# Patient Record
Sex: MALE | Race: WHITE | NOT HISPANIC OR LATINO | Employment: PART TIME | URBAN - METROPOLITAN AREA
[De-identification: names, ages, dates, MRNs, and addresses within clinical notes are randomized per-mention and may not be internally consistent; named-entity substitution may affect disease eponyms.]

---

## 2023-02-09 PROBLEM — I10 BENIGN ESSENTIAL HYPERTENSION: Status: ACTIVE | Noted: 2023-02-09

## 2023-02-09 PROBLEM — I48.0 PAROXYSMAL ATRIAL FIBRILLATION (MULTI): Status: ACTIVE | Noted: 2023-02-09

## 2023-02-09 PROBLEM — I50.9 CHF (CONGESTIVE HEART FAILURE), NYHA CLASS II (MULTI): Status: ACTIVE | Noted: 2023-02-09

## 2023-02-09 PROBLEM — R19.5 POSITIVE COLORECTAL CANCER SCREENING USING COLOGUARD TEST: Status: ACTIVE | Noted: 2023-02-09

## 2023-02-09 PROBLEM — R73.09 ELEVATED GLUCOSE: Status: ACTIVE | Noted: 2023-02-09

## 2023-02-09 PROBLEM — I42.9 CARDIOMYOPATHY (MULTI): Status: ACTIVE | Noted: 2023-02-09

## 2023-02-09 PROBLEM — B00.9 HERPES SIMPLEX VIRUS INFECTION: Status: ACTIVE | Noted: 2023-02-09

## 2023-02-09 PROBLEM — E78.2 MIXED HYPERLIPIDEMIA: Status: ACTIVE | Noted: 2023-02-09

## 2023-02-09 PROBLEM — I25.10 CAD (CORONARY ARTERY DISEASE): Status: ACTIVE | Noted: 2023-02-09

## 2023-02-09 PROBLEM — R97.20 ELEVATED PSA: Status: ACTIVE | Noted: 2023-02-09

## 2023-02-09 PROBLEM — E66.3 OVERWEIGHT WITH BODY MASS INDEX (BMI) OF 27 TO 27.9 IN ADULT: Status: ACTIVE | Noted: 2023-02-09

## 2023-02-09 RX ORDER — TALC
1 POWDER (GRAM) TOPICAL DAILY
COMMUNITY
Start: 2021-10-08

## 2023-02-09 RX ORDER — ACYCLOVIR 400 MG/1
1 TABLET ORAL 2 TIMES DAILY PRN
COMMUNITY
End: 2023-03-23 | Stop reason: SDUPTHER

## 2023-02-09 RX ORDER — SOTALOL HYDROCHLORIDE 120 MG/1
1 TABLET ORAL 2 TIMES DAILY
COMMUNITY
Start: 2022-05-27 | End: 2024-04-03 | Stop reason: SDUPTHER

## 2023-02-09 RX ORDER — AMLODIPINE BESYLATE 10 MG/1
1 TABLET ORAL DAILY
COMMUNITY
Start: 2018-12-03 | End: 2023-05-26 | Stop reason: SDUPTHER

## 2023-02-09 RX ORDER — AA/PROT/LYSINE/METHIO/VIT C/B6 50-12.5 MG
1 TABLET ORAL
COMMUNITY
Start: 2021-10-08 | End: 2023-03-23 | Stop reason: ALTCHOICE

## 2023-02-09 RX ORDER — LISINOPRIL 10 MG/1
1 TABLET ORAL DAILY
COMMUNITY
End: 2023-03-13 | Stop reason: SDUPTHER

## 2023-02-09 RX ORDER — ASPIRIN 81 MG/1
1 TABLET ORAL DAILY
COMMUNITY

## 2023-02-09 RX ORDER — METOPROLOL SUCCINATE 50 MG/1
1 TABLET, EXTENDED RELEASE ORAL NIGHTLY
COMMUNITY
Start: 2022-05-27 | End: 2024-04-03 | Stop reason: SDUPTHER

## 2023-02-09 RX ORDER — LOVASTATIN 10 MG/1
1 TABLET ORAL DAILY
COMMUNITY
Start: 2020-08-11 | End: 2024-01-11 | Stop reason: SDUPTHER

## 2023-03-13 ENCOUNTER — TELEPHONE (OUTPATIENT)
Dept: PRIMARY CARE | Facility: CLINIC | Age: 68
End: 2023-03-13
Payer: MEDICARE

## 2023-03-13 DIAGNOSIS — I10 PRIMARY HYPERTENSION: ICD-10-CM

## 2023-03-13 DIAGNOSIS — I10 PRIMARY HYPERTENSION: Primary | ICD-10-CM

## 2023-03-13 RX ORDER — LISINOPRIL 10 MG/1
10 TABLET ORAL DAILY
Qty: 90 TABLET | Refills: 3 | Status: SHIPPED | OUTPATIENT
Start: 2023-03-13 | End: 2023-03-14 | Stop reason: SDUPTHER

## 2023-03-14 RX ORDER — EPINEPHRINE 0.22MG
AEROSOL WITH ADAPTER (ML) INHALATION
COMMUNITY
End: 2023-03-23 | Stop reason: ALTCHOICE

## 2023-03-14 RX ORDER — LANOLIN ALCOHOL/MO/W.PET/CERES
CREAM (GRAM) TOPICAL
COMMUNITY
End: 2023-03-23 | Stop reason: ALTCHOICE

## 2023-03-14 RX ORDER — AMLODIPINE BESYLATE 10 MG/1
1 TABLET ORAL DAILY
COMMUNITY
End: 2023-03-23 | Stop reason: ALTCHOICE

## 2023-03-14 RX ORDER — PREDNISOLONE ACETATE 10 MG/ML
1 SUSPENSION/ DROPS OPHTHALMIC 3 TIMES DAILY
COMMUNITY
Start: 2022-06-26 | End: 2023-03-23 | Stop reason: ALTCHOICE

## 2023-03-14 RX ORDER — NITROGLYCERIN 0.4 MG/1
0.4 TABLET SUBLINGUAL EVERY 5 MIN PRN
COMMUNITY
Start: 2022-04-24

## 2023-03-14 RX ORDER — KETOROLAC TROMETHAMINE 5 MG/ML
1 SOLUTION OPHTHALMIC 4 TIMES DAILY
COMMUNITY
Start: 2022-05-04 | End: 2024-04-03 | Stop reason: WASHOUT

## 2023-03-14 RX ORDER — LISINOPRIL 10 MG/1
10 TABLET ORAL DAILY
Qty: 90 TABLET | Refills: 3 | Status: SHIPPED | OUTPATIENT
Start: 2023-03-14 | End: 2024-01-11 | Stop reason: SDUPTHER

## 2023-03-23 ENCOUNTER — OFFICE VISIT (OUTPATIENT)
Dept: PRIMARY CARE | Facility: CLINIC | Age: 68
End: 2023-03-23
Payer: MEDICARE

## 2023-03-23 VITALS
DIASTOLIC BLOOD PRESSURE: 82 MMHG | SYSTOLIC BLOOD PRESSURE: 144 MMHG | HEIGHT: 71 IN | WEIGHT: 183 LBS | BODY MASS INDEX: 25.62 KG/M2

## 2023-03-23 DIAGNOSIS — I48.19 OTHER PERSISTENT ATRIAL FIBRILLATION (MULTI): ICD-10-CM

## 2023-03-23 DIAGNOSIS — B00.9 HERPES: Primary | ICD-10-CM

## 2023-03-23 DIAGNOSIS — E78.2 MIXED HYPERLIPIDEMIA: ICD-10-CM

## 2023-03-23 DIAGNOSIS — I50.42 CHRONIC COMBINED SYSTOLIC AND DIASTOLIC CONGESTIVE HEART FAILURE, NYHA CLASS 2 (MULTI): ICD-10-CM

## 2023-03-23 DIAGNOSIS — E66.3 OVERWEIGHT WITH BODY MASS INDEX (BMI) OF 27 TO 27.9 IN ADULT: ICD-10-CM

## 2023-03-23 DIAGNOSIS — I48.0 PAROXYSMAL ATRIAL FIBRILLATION (MULTI): ICD-10-CM

## 2023-03-23 DIAGNOSIS — I25.10 CORONARY ARTERY DISEASE INVOLVING NATIVE CORONARY ARTERY OF NATIVE HEART WITHOUT ANGINA PECTORIS: ICD-10-CM

## 2023-03-23 DIAGNOSIS — B00.9 HERPES SIMPLEX VIRUS INFECTION: ICD-10-CM

## 2023-03-23 DIAGNOSIS — I10 BENIGN ESSENTIAL HYPERTENSION: ICD-10-CM

## 2023-03-23 PROCEDURE — 3077F SYST BP >= 140 MM HG: CPT | Performed by: FAMILY MEDICINE

## 2023-03-23 PROCEDURE — 3079F DIAST BP 80-89 MM HG: CPT | Performed by: FAMILY MEDICINE

## 2023-03-23 PROCEDURE — 1159F MED LIST DOCD IN RCRD: CPT | Performed by: FAMILY MEDICINE

## 2023-03-23 PROCEDURE — 1160F RVW MEDS BY RX/DR IN RCRD: CPT | Performed by: FAMILY MEDICINE

## 2023-03-23 PROCEDURE — 99213 OFFICE O/P EST LOW 20 MIN: CPT | Performed by: FAMILY MEDICINE

## 2023-03-23 PROCEDURE — 3008F BODY MASS INDEX DOCD: CPT | Performed by: FAMILY MEDICINE

## 2023-03-23 RX ORDER — ACYCLOVIR 400 MG/1
400 TABLET ORAL
Qty: 30 TABLET | Refills: 4 | Status: SHIPPED | OUTPATIENT
Start: 2023-03-23 | End: 2023-04-22

## 2023-03-23 ASSESSMENT — ENCOUNTER SYMPTOMS
PSYCHIATRIC NEGATIVE: 1
RESPIRATORY NEGATIVE: 1
NEUROLOGICAL NEGATIVE: 1
CONSTITUTIONAL NEGATIVE: 1
HEMATOLOGIC/LYMPHATIC NEGATIVE: 1
ENDOCRINE NEGATIVE: 1
BLOOD IN STOOL: 1
ALLERGIC/IMMUNOLOGIC NEGATIVE: 1
CARDIOVASCULAR NEGATIVE: 1

## 2023-03-23 NOTE — PATIENT INSTRUCTIONS
Contd meds , diet ,daily X's ,FUWOD's ,labs , pt has apt with  for colonscopy next month  , tcb x 1wk , monitor bp daily , , all reports noted ,

## 2023-04-21 NOTE — PROGRESS NOTES
"Subjective   Patient ID: Thomas Lay is a 67 y.o. male who presents for Follow-up (Patient presented today for a 4 month follow up to medication mangement.).    RX , ROV         Review of Systems   Constitutional: Negative.    HENT: Negative.     Eyes:  Positive for visual disturbance.   Respiratory: Negative.     Cardiovascular: Negative.    Gastrointestinal:  Positive for blood in stool.   Endocrine: Negative.    Genitourinary: Negative.    Skin: Negative.    Allergic/Immunologic: Negative.    Neurological: Negative.    Hematological: Negative.    Psychiatric/Behavioral: Negative.         Objective   /82   Ht 1.803 m (5' 11\")   Wt 83 kg (183 lb)   BMI 25.52 kg/m²     Physical Exam  Constitutional:       Appearance: Normal appearance.   HENT:      Head: Normocephalic and atraumatic.      Nose: Nose normal.      Mouth/Throat:      Mouth: Mucous membranes are moist.   Cardiovascular:      Rate and Rhythm: Normal rate. Rhythm irregular.      Heart sounds: No murmur heard.     No friction rub. No gallop.   Pulmonary:      Effort: Pulmonary effort is normal.   Musculoskeletal:         General: Normal range of motion.      Cervical back: Normal range of motion and neck supple.   Skin:     General: Skin is warm.      Findings: Erythema: lower lips.   Neurological:      General: No focal deficit present.      Mental Status: He is alert and oriented to person, place, and time.   Psychiatric:         Mood and Affect: Mood normal.         Behavior: Behavior normal.         Assessment/Plan          "
same as pt

## 2023-05-26 DIAGNOSIS — I10 BENIGN ESSENTIAL HYPERTENSION: Primary | ICD-10-CM

## 2023-05-26 RX ORDER — AMLODIPINE BESYLATE 10 MG/1
10 TABLET ORAL DAILY
Qty: 90 TABLET | Refills: 3 | Status: SHIPPED | OUTPATIENT
Start: 2023-05-26 | End: 2024-01-11 | Stop reason: SDUPTHER

## 2023-07-21 ENCOUNTER — LAB (OUTPATIENT)
Dept: LAB | Facility: LAB | Age: 68
End: 2023-07-21
Payer: MEDICARE

## 2023-07-21 DIAGNOSIS — I10 BENIGN ESSENTIAL HYPERTENSION: ICD-10-CM

## 2023-07-21 LAB
ALANINE AMINOTRANSFERASE (SGPT) (U/L) IN SER/PLAS: 14 U/L (ref 10–52)
ALBUMIN (G/DL) IN SER/PLAS: 4.4 G/DL (ref 3.4–5)
ALKALINE PHOSPHATASE (U/L) IN SER/PLAS: 70 U/L (ref 33–136)
ANION GAP IN SER/PLAS: 10 MMOL/L (ref 10–20)
ASPARTATE AMINOTRANSFERASE (SGOT) (U/L) IN SER/PLAS: 16 U/L (ref 9–39)
BILIRUBIN TOTAL (MG/DL) IN SER/PLAS: 0.6 MG/DL (ref 0–1.2)
CALCIUM (MG/DL) IN SER/PLAS: 9.3 MG/DL (ref 8.6–10.3)
CARBON DIOXIDE, TOTAL (MMOL/L) IN SER/PLAS: 29 MMOL/L (ref 21–32)
CHLORIDE (MMOL/L) IN SER/PLAS: 107 MMOL/L (ref 98–107)
CHOLESTEROL (MG/DL) IN SER/PLAS: 146 MG/DL (ref 0–199)
CHOLESTEROL IN HDL (MG/DL) IN SER/PLAS: 55.5 MG/DL
CHOLESTEROL/HDL RATIO: 2.6
CREATININE (MG/DL) IN SER/PLAS: 1.01 MG/DL (ref 0.5–1.3)
GFR MALE: 81 ML/MIN/1.73M2
GLUCOSE (MG/DL) IN SER/PLAS: 104 MG/DL (ref 74–99)
LDL: 69 MG/DL (ref 0–99)
POTASSIUM (MMOL/L) IN SER/PLAS: 4.6 MMOL/L (ref 3.5–5.3)
PROTEIN TOTAL: 6.8 G/DL (ref 6.4–8.2)
SODIUM (MMOL/L) IN SER/PLAS: 141 MMOL/L (ref 136–145)
TRIGLYCERIDE (MG/DL) IN SER/PLAS: 108 MG/DL (ref 0–149)
UREA NITROGEN (MG/DL) IN SER/PLAS: 13 MG/DL (ref 6–23)
VLDL: 22 MG/DL (ref 0–40)

## 2023-07-21 PROCEDURE — 80061 LIPID PANEL: CPT

## 2023-07-21 PROCEDURE — 36415 COLL VENOUS BLD VENIPUNCTURE: CPT

## 2023-07-21 PROCEDURE — 80053 COMPREHEN METABOLIC PANEL: CPT

## 2023-07-27 ENCOUNTER — OFFICE VISIT (OUTPATIENT)
Dept: PRIMARY CARE | Facility: CLINIC | Age: 68
End: 2023-07-27
Payer: MEDICARE

## 2023-07-27 ENCOUNTER — APPOINTMENT (OUTPATIENT)
Dept: PRIMARY CARE | Facility: CLINIC | Age: 68
End: 2023-07-27
Payer: MEDICARE

## 2023-07-27 VITALS
SYSTOLIC BLOOD PRESSURE: 135 MMHG | HEART RATE: 62 BPM | OXYGEN SATURATION: 96 % | WEIGHT: 197 LBS | BODY MASS INDEX: 27.58 KG/M2 | HEIGHT: 71 IN | DIASTOLIC BLOOD PRESSURE: 81 MMHG

## 2023-07-27 DIAGNOSIS — D12.6 ADENOMATOUS POLYP OF COLON, UNSPECIFIED PART OF COLON: ICD-10-CM

## 2023-07-27 DIAGNOSIS — E55.9 VITAMIN D DEFICIENCY: ICD-10-CM

## 2023-07-27 DIAGNOSIS — I48.0 PAROXYSMAL ATRIAL FIBRILLATION (MULTI): Primary | ICD-10-CM

## 2023-07-27 DIAGNOSIS — I25.10 CORONARY ARTERY DISEASE INVOLVING NATIVE CORONARY ARTERY OF NATIVE HEART WITHOUT ANGINA PECTORIS: ICD-10-CM

## 2023-07-27 DIAGNOSIS — R97.20 ELEVATED PSA: ICD-10-CM

## 2023-07-27 DIAGNOSIS — Z91.89 FRAMINGHAM CARDIAC RISK 10-20% IN NEXT 10 YEARS: ICD-10-CM

## 2023-07-27 DIAGNOSIS — K57.30 SIGMOID DIVERTICULOSIS: ICD-10-CM

## 2023-07-27 DIAGNOSIS — E78.2 MIXED HYPERLIPIDEMIA: ICD-10-CM

## 2023-07-27 DIAGNOSIS — Z53.20 SCREENING FOR HEPATITIS C DECLINED: ICD-10-CM

## 2023-07-27 DIAGNOSIS — I10 ESSENTIAL HYPERTENSION: ICD-10-CM

## 2023-07-27 DIAGNOSIS — I50.42 CHRONIC COMBINED SYSTOLIC AND DIASTOLIC CONGESTIVE HEART FAILURE, NYHA CLASS 2 (MULTI): ICD-10-CM

## 2023-07-27 DIAGNOSIS — R35.1 NOCTURIA: ICD-10-CM

## 2023-07-27 DIAGNOSIS — R73.9 HYPERGLYCEMIA: ICD-10-CM

## 2023-07-27 PROCEDURE — 1160F RVW MEDS BY RX/DR IN RCRD: CPT | Performed by: INTERNAL MEDICINE

## 2023-07-27 PROCEDURE — 99204 OFFICE O/P NEW MOD 45 MIN: CPT | Performed by: INTERNAL MEDICINE

## 2023-07-27 PROCEDURE — 3075F SYST BP GE 130 - 139MM HG: CPT | Performed by: INTERNAL MEDICINE

## 2023-07-27 PROCEDURE — 3008F BODY MASS INDEX DOCD: CPT | Performed by: INTERNAL MEDICINE

## 2023-07-27 PROCEDURE — 3079F DIAST BP 80-89 MM HG: CPT | Performed by: INTERNAL MEDICINE

## 2023-07-27 PROCEDURE — 1036F TOBACCO NON-USER: CPT | Performed by: INTERNAL MEDICINE

## 2023-07-27 PROCEDURE — 1159F MED LIST DOCD IN RCRD: CPT | Performed by: INTERNAL MEDICINE

## 2023-07-27 RX ORDER — ACYCLOVIR 400 MG/1
TABLET ORAL 2 TIMES DAILY
COMMUNITY
Start: 2021-06-30 | End: 2024-04-04 | Stop reason: SDUPTHER

## 2023-07-27 NOTE — PROGRESS NOTES
Subjective   Patient ID: Thomas Lay is a 67 y.o. male who presents for Establish Care (Pt in today to establish care).    HPI   67-year-old male here for primary care.  Previous patient of Dr. Platt.  Seems to have been doing well, and in the meantime, would like to make sure that he has continue with the of care, including adequate refills.  Compliant with medications, tolerating regimens.    The patient points out that he just recently had a COLONOSCOPY with ANDRES Carmona.  Findings were relatively benign, with biopsy noted.  Patient recommended to have repeat study in about 3 years.  In the meantime, the patient has been doing well.   Appetite preserved, with no nausea, vomiting, abdominal distress.  No diarrhea, no constipation.  No apparent blood in stool.  No apparent weight loss.  No skin changes, rashes, pruritus, jaundice.  No easy bruisability.      Noted to have elevated blood pressure, for which the patient states that on average, his blood pressure runs 130s over 70s, with no readings above 140 systolically.  Furthermore, compliant with medications, tolerating regimens.  Following closely with EP, as well as CARDIOLOGY, especially with significant cardiac history.  Has been hemodynamically asymptomatic.  No severiano chest pain.  No orthopnea, no paroxysmal nocturnal dyspnea.  No dizziness, diaphoresis, palpitations.  No loss of consciousness.  No bipedal edema.  No remarkable dyspnea on exertion.      Noted to have elevated PSA, with patient stating that he has had work-up in the past, including ultrasound of his prostate.  States that he has no particular hesitancy, and feels that perhaps he is emptying completely.  No dysuria, flank, suprapubic pain.  No urgency.  No penile discharge, pruritus, rash, no jaundice.  Still, he does point out that he has had some nocturia, but most of the time only once, sometimes twice, and never 3 times each night.    Has otherwise been doing well.  Physically  "active.  No coughing, no particular sputum production.  CONSTITUTIONALLY, no fever, no chills.  No night sweats.  No lingering anorexia or nausea.  No apparent lymphadenopathy.  No apparent weight loss.        Review of Systems  Review of systems as in history of present illness, and otherwise, reviewed separately as well, and was unremarkable/negative/noncontributory.        Objective   /81 (BP Location: Left arm, Patient Position: Sitting)   Pulse 62   Ht 1.803 m (5' 11\")   Wt 89.4 kg (197 lb)   SpO2 96%   BMI 27.48 kg/m²     Physical Exam  In very good spirits.  Not in distress or diaphoresis.  Alert, oriented x3.  Amiable.  Not unkempt.  Moderately built.  Not cachectic.  Receptive.  Cheerful.  Appropriate.  Eager to stay healthy, independent, and productive, and does not wish harm to self or others.    HEAD pink palpebral conjunctivae, anicteric sclerae.  Mucous membranes moist.  No tonsillopharyngeal congestion, no thrush.  NECK supple, no apparent jugular venous distention.  No carotid bruit noted.  CARDIOVASCULAR not in distress or diaphoresis.  No bipedal edema.  Regular rate and rhythm.  No murmurs.  LUNGS not in distress or diaphoresis.  Not using accessory muscles.  Clear to auscultation bilaterally.  ABDOMEN soft, nontender.  BACK no costovertebral angle tenderness.  EXTREMITIES no clubbing, no cyanosis.  SKIN with no breakdown, bleeding, jaundice.  NEURO no facial asymmetry.  No apparent cranial nerve deficits.  Romberg negative.  Ambulating without need of assistance.  No apparent focal weakness.  No tremors.  PSYCH receptive, appropriate, and eager to maintain and improve quality of life.      Interim LABORATORY examination results, COLONOSCOPY results noted, reviewed with patient.        Assessment/Plan   Problem List Items Addressed This Visit       Adenomatous polyp of colon    CAD (coronary artery disease)    CHF (congestive heart failure), NYHA class II (CMS/HCC)    Elevated PSA    " Essential hypertension    [place] cardiac risk 10-20% in next 10 years    Hyperglycemia    Mixed hyperlipidemia    Paroxysmal atrial fibrillation (CMS/HCC) - Primary    Sigmoid diverticulosis    Vitamin D deficiency     Other Visit Diagnoses       Nocturia        Screening for hepatitis C declined                 Thank you very much for coming.  It is nice to meet you.    Thank you for taking care of yourself.  I would like to contribute to your overall care.  I am glad to hear that you have been seeing Dr. Calle, EP, as well as Dr. Dupree, CARDIOLOGY, and they have been confident enough to tell you to just come back once a year.    Yes, if you have any refill needs, please contact the office, either by telephone, or through the Internet.  It is best to get in touch with us, instead of going directly to your pharmacy, because we will know exactly what you will need.    Your blood pressure is mildly elevated today.  Please continue to check your blood pressure at home while at rest.  Make sure that the first number stays 139 or less, and make sure that the second number stays 88 or less.  If you have any persistent elevations, please let me know, and I can give you advice until you are seen again by cardiology/EP.    Please continue to stay physically active.  AEROBIC activities are best for blood pressure control, and are very good for mood, energy, and weight management!  You look like you are able to exercise regularly.  Please continue to take care of yourself.    Likewise, please drink lots of fluids, and avoid salt.  Good for blood pressure control, and good for keeping your kidneys healthy.    I am glad to see that you saw GI, Dr. Andersen, and that he proceeded to do a biopsy.  With history of colon polyps and tubular adenoma, it is ideal that you have a repeat colonoscopy within the next 3 years.  I am glad that this is already scheduled.    I do understand that you have an enlarged prostate, and that  sometimes, you have to get up in the middle of the night to urinate.  It will be time to repeat your PSA and urine studies in August.  Until then, try to limit your fluid intake by taking only sips of water after supper.  This way, you will not have to get up as often to urinate.    There are medications that we can try if symptoms persist, especially if you have to get up to urinate 3 or more times each night.  As we have discussed, they are not without risks, but they can be very helpful if needed.  Let me know if you would like to talk about this sooner.  We will also discuss it when you return in about 6 weeks.    Thank you for having your FASTING laboratory examinations done care of Dr. Platt.  Your kidney and liver functions were preserved.  Your cholesterol panel is close to ideal.  Please continue your current regimens.    Medicare recommends that you get your PNEUMONIA vaccination.  This is only once for your lifetime, and does not need to be repeated.  Schedule this with your local friendly pharmacist.  Ask for PREVNAR 20.    After 2 weeks, go ahead and schedule for your SHINGLES vaccination, which is a SHINGRIX series of 2 injections at least 1 month apart.  Again, recommended by Medicare, and already paid for by your insurance.    Please do not take more than 1 vaccination per visit.  Space them out at least 2 weeks apart.    Again, thank you very much for coming.  It is a pleasure to meet you.  Thank you for taking care of yourself.  Please let me help.  Call or text as needed.  Please come back in 6 weeks, so that we can do your Medicare Wellness evaluation for the year.  Go ahead and do NONFASTING blood and urine examinations a few days prior, so that we can also review the results at that point.    Please continue to take care of yourself and your family, and please continue to pray for our recovery from this pandemic.  See you in 6 weeks.  Call sooner please as needed.            0  Return in 6  weeks.  40 minutes please.  Nonfasting laboratory examinations via [employer], then see me soon after for Medicare Wellness evaluation for the year.  Reassess mood, energy, function, preventive strategies, cardiovascular risk.  Reassess urinary symptoms.  Coordinate with cardiology, EP, GI.            0

## 2023-08-09 ENCOUNTER — TELEPHONE (OUTPATIENT)
Dept: PRIMARY CARE | Facility: CLINIC | Age: 68
End: 2023-08-09
Payer: MEDICARE

## 2023-08-09 NOTE — TELEPHONE ENCOUNTER
Patient called wanting to schedule an appt to establish care with Dr. Goodman.   Please advise if ok to add to schedule

## 2023-09-06 LAB
ALANINE AMINOTRANSFERASE (SGPT) (U/L) IN SER/PLAS: 17 U/L (ref 10–52)
ALBUMIN (G/DL) IN SER/PLAS: 4.3 G/DL (ref 3.4–5)
ALKALINE PHOSPHATASE (U/L) IN SER/PLAS: 66 U/L (ref 33–136)
ANION GAP IN SER/PLAS: 11 MMOL/L (ref 10–20)
APPEARANCE, URINE: CLEAR
ASPARTATE AMINOTRANSFERASE (SGOT) (U/L) IN SER/PLAS: 19 U/L (ref 9–39)
BASOPHILS (10*3/UL) IN BLOOD BY AUTOMATED COUNT: 0.05 X10E9/L (ref 0–0.1)
BASOPHILS/100 LEUKOCYTES IN BLOOD BY AUTOMATED COUNT: 0.8 % (ref 0–2)
BILIRUBIN TOTAL (MG/DL) IN SER/PLAS: 0.5 MG/DL (ref 0–1.2)
BILIRUBIN, URINE: NEGATIVE
BLOOD, URINE: ABNORMAL
CALCIDIOL (25 OH VITAMIN D3) (NG/ML) IN SER/PLAS: 9 NG/ML
CALCIUM (MG/DL) IN SER/PLAS: 8.8 MG/DL (ref 8.6–10.3)
CARBON DIOXIDE, TOTAL (MMOL/L) IN SER/PLAS: 26 MMOL/L (ref 21–32)
CHLORIDE (MMOL/L) IN SER/PLAS: 108 MMOL/L (ref 98–107)
CHOLESTEROL (MG/DL) IN SER/PLAS: 150 MG/DL (ref 0–199)
CHOLESTEROL IN HDL (MG/DL) IN SER/PLAS: 55.2 MG/DL
CHOLESTEROL/HDL RATIO: 2.7
COLOR, URINE: YELLOW
CREATININE (MG/DL) IN SER/PLAS: 1.02 MG/DL (ref 0.5–1.3)
EOSINOPHILS (10*3/UL) IN BLOOD BY AUTOMATED COUNT: 0.27 X10E9/L (ref 0–0.7)
EOSINOPHILS/100 LEUKOCYTES IN BLOOD BY AUTOMATED COUNT: 4.1 % (ref 0–6)
ERYTHROCYTE DISTRIBUTION WIDTH (RATIO) BY AUTOMATED COUNT: 12.9 % (ref 11.5–14.5)
ERYTHROCYTE MEAN CORPUSCULAR HEMOGLOBIN CONCENTRATION (G/DL) BY AUTOMATED: 33.3 G/DL (ref 32–36)
ERYTHROCYTE MEAN CORPUSCULAR VOLUME (FL) BY AUTOMATED COUNT: 90 FL (ref 80–100)
ERYTHROCYTES (10*6/UL) IN BLOOD BY AUTOMATED COUNT: 4.75 X10E12/L (ref 4.5–5.9)
GFR MALE: 80 ML/MIN/1.73M2
GLUCOSE (MG/DL) IN SER/PLAS: 96 MG/DL (ref 74–99)
GLUCOSE, URINE: NEGATIVE MG/DL
HEMATOCRIT (%) IN BLOOD BY AUTOMATED COUNT: 42.7 % (ref 41–52)
HEMOGLOBIN (G/DL) IN BLOOD: 14.2 G/DL (ref 13.5–17.5)
IMMATURE GRANULOCYTES/100 LEUKOCYTES IN BLOOD BY AUTOMATED COUNT: 0.2 % (ref 0–0.9)
KETONES, URINE: NEGATIVE MG/DL
LDL: 70 MG/DL (ref 0–99)
LEUKOCYTE ESTERASE, URINE: NEGATIVE
LEUKOCYTES (10*3/UL) IN BLOOD BY AUTOMATED COUNT: 6.6 X10E9/L (ref 4.4–11.3)
LYMPHOCYTES (10*3/UL) IN BLOOD BY AUTOMATED COUNT: 2.08 X10E9/L (ref 1.2–4.8)
LYMPHOCYTES/100 LEUKOCYTES IN BLOOD BY AUTOMATED COUNT: 31.5 % (ref 13–44)
MONOCYTES (10*3/UL) IN BLOOD BY AUTOMATED COUNT: 0.65 X10E9/L (ref 0.1–1)
MONOCYTES/100 LEUKOCYTES IN BLOOD BY AUTOMATED COUNT: 9.8 % (ref 2–10)
MUCUS, URINE: NORMAL /LPF
NEUTROPHILS (10*3/UL) IN BLOOD BY AUTOMATED COUNT: 3.55 X10E9/L (ref 1.2–7.7)
NEUTROPHILS/100 LEUKOCYTES IN BLOOD BY AUTOMATED COUNT: 53.6 % (ref 40–80)
NITRITE, URINE: NEGATIVE
PH, URINE: 6 (ref 5–8)
PLATELETS (10*3/UL) IN BLOOD AUTOMATED COUNT: 274 X10E9/L (ref 150–450)
POTASSIUM (MMOL/L) IN SER/PLAS: 4.3 MMOL/L (ref 3.5–5.3)
PROSTATE SPECIFIC AG (NG/ML) IN SER/PLAS: 3.16 NG/ML (ref 0–4)
PROTEIN TOTAL: 6.9 G/DL (ref 6.4–8.2)
PROTEIN, URINE: NEGATIVE MG/DL
RBC, URINE: 2 /HPF (ref 0–5)
SODIUM (MMOL/L) IN SER/PLAS: 141 MMOL/L (ref 136–145)
SPECIFIC GRAVITY, URINE: 1.02 (ref 1–1.03)
THYROTROPIN (MIU/L) IN SER/PLAS BY DETECTION LIMIT <= 0.05 MIU/L: 1.83 MIU/L (ref 0.44–3.98)
TRIGLYCERIDE (MG/DL) IN SER/PLAS: 126 MG/DL (ref 0–149)
UREA NITROGEN (MG/DL) IN SER/PLAS: 20 MG/DL (ref 6–23)
UROBILINOGEN, URINE: <2 MG/DL (ref 0–1.9)
VLDL: 25 MG/DL (ref 0–40)
WBC, URINE: 1 /HPF (ref 0–5)

## 2023-10-05 ENCOUNTER — APPOINTMENT (OUTPATIENT)
Dept: PRIMARY CARE | Facility: CLINIC | Age: 68
End: 2023-10-05
Payer: MEDICARE

## 2023-11-28 ENCOUNTER — OFFICE VISIT (OUTPATIENT)
Dept: PRIMARY CARE | Facility: CLINIC | Age: 68
End: 2023-11-28
Payer: MEDICARE

## 2023-11-28 VITALS
HEART RATE: 69 BPM | WEIGHT: 194 LBS | OXYGEN SATURATION: 99 % | TEMPERATURE: 97.4 F | DIASTOLIC BLOOD PRESSURE: 90 MMHG | SYSTOLIC BLOOD PRESSURE: 130 MMHG | BODY MASS INDEX: 27.77 KG/M2 | HEIGHT: 70 IN

## 2023-11-28 DIAGNOSIS — I48.0 PAROXYSMAL ATRIAL FIBRILLATION (MULTI): ICD-10-CM

## 2023-11-28 DIAGNOSIS — N52.8 OTHER MALE ERECTILE DYSFUNCTION: ICD-10-CM

## 2023-11-28 DIAGNOSIS — E55.9 VITAMIN D DEFICIENCY: ICD-10-CM

## 2023-11-28 DIAGNOSIS — I10 ESSENTIAL HYPERTENSION: Primary | ICD-10-CM

## 2023-11-28 PROCEDURE — 1036F TOBACCO NON-USER: CPT | Performed by: INTERNAL MEDICINE

## 2023-11-28 PROCEDURE — 1160F RVW MEDS BY RX/DR IN RCRD: CPT | Performed by: INTERNAL MEDICINE

## 2023-11-28 PROCEDURE — 3080F DIAST BP >= 90 MM HG: CPT | Performed by: INTERNAL MEDICINE

## 2023-11-28 PROCEDURE — 1159F MED LIST DOCD IN RCRD: CPT | Performed by: INTERNAL MEDICINE

## 2023-11-28 PROCEDURE — 3075F SYST BP GE 130 - 139MM HG: CPT | Performed by: INTERNAL MEDICINE

## 2023-11-28 PROCEDURE — 3008F BODY MASS INDEX DOCD: CPT | Performed by: INTERNAL MEDICINE

## 2023-11-28 PROCEDURE — 99214 OFFICE O/P EST MOD 30 MIN: CPT | Performed by: INTERNAL MEDICINE

## 2023-11-28 RX ORDER — SILDENAFIL 100 MG/1
100 TABLET, FILM COATED ORAL DAILY PRN
Qty: 12 TABLET | Refills: 3 | Status: SHIPPED | OUTPATIENT
Start: 2023-11-28 | End: 2024-11-27

## 2023-11-28 RX ORDER — ERGOCALCIFEROL 1.25 MG/1
1.25 CAPSULE ORAL
COMMUNITY

## 2023-11-28 ASSESSMENT — PATIENT HEALTH QUESTIONNAIRE - PHQ9
2. FEELING DOWN, DEPRESSED OR HOPELESS: NOT AT ALL
SUM OF ALL RESPONSES TO PHQ9 QUESTIONS 1 AND 2: 0
1. LITTLE INTEREST OR PLEASURE IN DOING THINGS: NOT AT ALL

## 2023-11-29 ASSESSMENT — ENCOUNTER SYMPTOMS
LIGHT-HEADEDNESS: 0
COUGH: 0
ACTIVITY CHANGE: 0
ABDOMINAL PAIN: 0
DYSURIA: 0
SORE THROAT: 0
MYALGIAS: 0

## 2024-01-11 DIAGNOSIS — E78.2 MIXED HYPERLIPIDEMIA: ICD-10-CM

## 2024-01-11 DIAGNOSIS — I10 BENIGN ESSENTIAL HYPERTENSION: ICD-10-CM

## 2024-01-11 DIAGNOSIS — I10 PRIMARY HYPERTENSION: ICD-10-CM

## 2024-01-11 RX ORDER — AMLODIPINE BESYLATE 10 MG/1
10 TABLET ORAL DAILY
Qty: 90 TABLET | Refills: 1 | Status: SHIPPED | OUTPATIENT
Start: 2024-01-11

## 2024-01-11 RX ORDER — LOVASTATIN 10 MG/1
10 TABLET ORAL DAILY
Qty: 90 TABLET | Refills: 1 | Status: SHIPPED | OUTPATIENT
Start: 2024-01-11

## 2024-01-11 RX ORDER — LISINOPRIL 10 MG/1
10 TABLET ORAL DAILY
Qty: 90 TABLET | Refills: 3 | Status: SHIPPED | OUTPATIENT
Start: 2024-01-11

## 2024-01-12 ENCOUNTER — OFFICE VISIT (OUTPATIENT)
Dept: CARDIOLOGY | Facility: CLINIC | Age: 69
End: 2024-01-12
Payer: MEDICARE

## 2024-01-12 VITALS — HEART RATE: 88 BPM | SYSTOLIC BLOOD PRESSURE: 120 MMHG | DIASTOLIC BLOOD PRESSURE: 70 MMHG

## 2024-01-12 DIAGNOSIS — I10 ESSENTIAL HYPERTENSION: ICD-10-CM

## 2024-01-12 DIAGNOSIS — I42.0 DILATED CARDIOMYOPATHY (MULTI): ICD-10-CM

## 2024-01-12 DIAGNOSIS — Z87.891 FORMER CIGARETTE SMOKER: ICD-10-CM

## 2024-01-12 DIAGNOSIS — E78.2 MIXED HYPERLIPIDEMIA: ICD-10-CM

## 2024-01-12 DIAGNOSIS — I25.10 CORONARY ARTERY DISEASE INVOLVING NATIVE CORONARY ARTERY OF NATIVE HEART WITHOUT ANGINA PECTORIS: ICD-10-CM

## 2024-01-12 DIAGNOSIS — I48.0 PAROXYSMAL ATRIAL FIBRILLATION (MULTI): ICD-10-CM

## 2024-01-12 PROCEDURE — 1036F TOBACCO NON-USER: CPT | Performed by: INTERNAL MEDICINE

## 2024-01-12 PROCEDURE — 3008F BODY MASS INDEX DOCD: CPT | Performed by: INTERNAL MEDICINE

## 2024-01-12 PROCEDURE — 99214 OFFICE O/P EST MOD 30 MIN: CPT | Performed by: INTERNAL MEDICINE

## 2024-01-12 PROCEDURE — 1159F MED LIST DOCD IN RCRD: CPT | Performed by: INTERNAL MEDICINE

## 2024-01-12 PROCEDURE — 3078F DIAST BP <80 MM HG: CPT | Performed by: INTERNAL MEDICINE

## 2024-01-12 PROCEDURE — 3074F SYST BP LT 130 MM HG: CPT | Performed by: INTERNAL MEDICINE

## 2024-01-12 RX ORDER — CIPROFLOXACIN HYDROCHLORIDE 3 MG/ML
SOLUTION/ DROPS OPHTHALMIC
COMMUNITY
Start: 2024-01-05 | End: 2024-04-03 | Stop reason: WASHOUT

## 2024-01-12 RX ORDER — PREDNISOLONE ACETATE 10 MG/ML
SUSPENSION/ DROPS OPHTHALMIC
COMMUNITY
Start: 2024-01-05 | End: 2024-04-03 | Stop reason: WASHOUT

## 2024-01-12 NOTE — PROGRESS NOTES
CARDIOLOGY OFFICE VISIT      CHIEF COMPLAINT  1 year follow up    HISTORY OF PRESENT ILLNESS  The patient states he just had right eye surgery and did well with that.  He denies chest discomfort or symptoms of myocardial ischemia.  He exercises regularly without any cardiovascular symptoms.  He denies any significant problem with dyspnea.  He states that since I saw he does have episodes where he goes into atrial fibrillation for half a day or day and then goes out.  I told him to make sure he mentions this to Dr. Calle when he sees him in the near future.  He denies any problem with his current medication.      Impression:  Paroxysmal atrial fibrillation  Coronary artery disease  Hypertension  Hyperlipidemia  Overweight  Dilated cardiomyopathy, left ventricular ejection fraction 30 to 35% on echocardiogram 2021, which has subsequent resolved with normal left ventricular systolic function on echocardiogram 2021, thought to be secondary to atrial fibrillation     Please excuse any errors in grammar or translation related to this dictation. Voice recognition software was utilized to prepare this document.          Past Medical History  Past Medical History:   Diagnosis Date    Abnormal findings on diagnostic imaging of heart and coronary circulation     Elevated coronary artery calcium score    Benign prostatic hyperplasia without lower urinary tract symptoms     Enlarged prostate without lower urinary tract symptoms (luts)    Encounter for general adult medical examination without abnormal findings     Health care maintenance    Overweight 2022    Overweight    Personal history of other infectious and parasitic diseases     History of herpes simplex infection       Social History  Social History     Tobacco Use    Smoking status: Former     Types: Cigarettes     Quit date:      Years since quittin.0    Smokeless tobacco: Never   Substance Use Topics    Alcohol use: Yes     Comment:  ocassionally    Drug use: Never       Family History     Family History   Problem Relation Name Age of Onset    Ulcerative colitis Mother      Colon cancer Mother      Colon cancer Father      Pancreatic cancer Father      Atrial fibrillation Brother      Ulcerative colitis Brother          Allergies:  Allergies   Allergen Reactions    Adhesive Tape-Silicones Unknown    Percocet [Oxycodone-Acetaminophen] Unknown        Outpatient Medications:  Current Outpatient Medications   Medication Instructions    acyclovir (Zovirax) 400 mg tablet oral, 2 times daily    amLODIPine (NORVASC) 10 mg, oral, Daily    apixaban (ELIQUIS) 5 mg, oral, 2 times daily    aspirin 81 mg EC tablet 1 tablet, oral, Daily    ciprofloxacin (Ciloxan) 0.3 % ophthalmic solution INSTILL 1 DROP IN RIGHT EYE FOUR TIMES DAILY    ergocalciferol (VITAMIN D-2) 1.25 mg, oral, Weekly    ketorolac (Acular) 0.5 % ophthalmic solution 1 drop, Both Eyes, 4 times daily    lisinopril 10 mg, oral, Daily    lovastatin (MEVACOR) 10 mg, oral, Daily    magnesium oxide (Mag-Ox) 250 mg magnesium tablet 1 tablet, oral, Daily    metoprolol succinate XL (Toprol-XL) 50 mg 24 hr tablet 1 tablet, oral, Nightly    nitroglycerin (NITROSTAT) 0.4 mg, sublingual, Every 5 min PRN    prednisoLONE acetate (Pred-Forte) 1 % ophthalmic suspension SHAKE LIQUID AND INSTILL 1 DROP IN RIGHT EYE FOUR TIMES DAILY    sildenafil (VIAGRA) 100 mg, oral, Daily PRN    sotalol (Betapace) 120 mg tablet 1 tablet, oral, 2 times daily          REVIEW OF SYSTEMS  Review of Systems   All other systems reviewed and are negative.        VITALS  Vitals:    01/12/24 1151   BP: 120/70   Pulse: 88       PHYSICAL EXAM  Constitutional:       Appearance: Healthy appearance. Not in distress.   Eyes:      Conjunctiva/sclera: Conjunctivae normal.      Pupils: Pupils are equal, round, and reactive to light.   Neck:      Vascular: No JVR. JVD normal.   Pulmonary:      Effort: Pulmonary effort is normal.      Breath  sounds: Normal breath sounds. No wheezing. No rhonchi. No rales.   Chest:      Chest wall: Not tender to palpatation.   Cardiovascular:      PMI at left midclavicular line. Normal rate. Irregularly irregular rhythm. Normal S1. Normal S2.       Murmurs: There is no murmur.      No gallop.  No click. No rub.   Pulses:     Intact distal pulses.   Edema:     Peripheral edema absent.   Abdominal:      Tenderness: There is no abdominal tenderness.   Musculoskeletal: Normal range of motion.         General: No tenderness.      Cervical back: Normal range of motion. Skin:     General: Skin is warm and dry.   Neurological:      General: No focal deficit present.      Mental Status: Alert and oriented to person, place and time.           ASSESSMENT AND PLAN  Diagnoses and all orders for this visit:  Paroxysmal atrial fibrillation (CMS/HCC)  Coronary artery disease involving native coronary artery of native heart without angina pectoris  Essential hypertension  Mixed hyperlipidemia  Dilated cardiomyopathy (CMS/HCC)  Former cigarette smoker      [unfilled]

## 2024-01-12 NOTE — PATIENT INSTRUCTIONS
Follow up office visit in 1 year.    Continue same medications/treatment.  Patient educated on proper medication use.  Please bring all medicines, vitamins and herbal supplements with you when you come to the office.    I, Meghan Rodriguez LPN, am scribing for and in the presence of Dr. Scotty Dupree MD, FACC

## 2024-03-11 ENCOUNTER — APPOINTMENT (OUTPATIENT)
Dept: PRIMARY CARE | Facility: CLINIC | Age: 69
End: 2024-03-11
Payer: MEDICARE

## 2024-03-19 ENCOUNTER — APPOINTMENT (OUTPATIENT)
Dept: PRIMARY CARE | Facility: CLINIC | Age: 69
End: 2024-03-19
Payer: MEDICARE

## 2024-04-03 ENCOUNTER — OFFICE VISIT (OUTPATIENT)
Dept: CARDIOLOGY | Facility: CLINIC | Age: 69
End: 2024-04-03
Payer: MEDICARE

## 2024-04-03 VITALS
SYSTOLIC BLOOD PRESSURE: 134 MMHG | WEIGHT: 190 LBS | DIASTOLIC BLOOD PRESSURE: 84 MMHG | HEIGHT: 70 IN | BODY MASS INDEX: 27.2 KG/M2 | HEART RATE: 93 BPM

## 2024-04-03 DIAGNOSIS — I10 ESSENTIAL HYPERTENSION: ICD-10-CM

## 2024-04-03 DIAGNOSIS — I48.11 LONGSTANDING PERSISTENT ATRIAL FIBRILLATION (MULTI): ICD-10-CM

## 2024-04-03 DIAGNOSIS — Z79.899 HIGH RISK MEDICATION USE: ICD-10-CM

## 2024-04-03 DIAGNOSIS — Z79.01 ANTICOAGULATION MANAGEMENT ENCOUNTER: ICD-10-CM

## 2024-04-03 DIAGNOSIS — I48.0 PAROXYSMAL ATRIAL FIBRILLATION (MULTI): Primary | ICD-10-CM

## 2024-04-03 DIAGNOSIS — Z51.81 ANTICOAGULATION MANAGEMENT ENCOUNTER: ICD-10-CM

## 2024-04-03 DIAGNOSIS — Z87.891 FORMER CIGARETTE SMOKER: ICD-10-CM

## 2024-04-03 PROBLEM — E66.3 OVERWEIGHT WITH BODY MASS INDEX (BMI) OF 28 TO 28.9 IN ADULT: Status: RESOLVED | Noted: 2023-02-09 | Resolved: 2024-04-03

## 2024-04-03 PROCEDURE — 3008F BODY MASS INDEX DOCD: CPT | Performed by: INTERNAL MEDICINE

## 2024-04-03 PROCEDURE — 3079F DIAST BP 80-89 MM HG: CPT | Performed by: INTERNAL MEDICINE

## 2024-04-03 PROCEDURE — 1036F TOBACCO NON-USER: CPT | Performed by: INTERNAL MEDICINE

## 2024-04-03 PROCEDURE — 3075F SYST BP GE 130 - 139MM HG: CPT | Performed by: INTERNAL MEDICINE

## 2024-04-03 PROCEDURE — 99215 OFFICE O/P EST HI 40 MIN: CPT | Performed by: INTERNAL MEDICINE

## 2024-04-03 PROCEDURE — 1159F MED LIST DOCD IN RCRD: CPT | Performed by: INTERNAL MEDICINE

## 2024-04-03 PROCEDURE — 93000 ELECTROCARDIOGRAM COMPLETE: CPT | Performed by: INTERNAL MEDICINE

## 2024-04-03 RX ORDER — SOTALOL HYDROCHLORIDE 120 MG/1
180 TABLET ORAL 2 TIMES DAILY
Qty: 270 TABLET | Refills: 3 | Status: SHIPPED | OUTPATIENT
Start: 2024-04-03

## 2024-04-03 RX ORDER — METOPROLOL SUCCINATE 50 MG/1
50 TABLET, EXTENDED RELEASE ORAL NIGHTLY
Qty: 90 TABLET | Refills: 3 | Status: SHIPPED | OUTPATIENT
Start: 2024-04-03

## 2024-04-03 ASSESSMENT — PATIENT HEALTH QUESTIONNAIRE - PHQ9
1. LITTLE INTEREST OR PLEASURE IN DOING THINGS: NOT AT ALL
SUM OF ALL RESPONSES TO PHQ9 QUESTIONS 1 AND 2: 0
2. FEELING DOWN, DEPRESSED OR HOPELESS: NOT AT ALL

## 2024-04-03 ASSESSMENT — ENCOUNTER SYMPTOMS
DEPRESSION: 0
IRREGULAR HEARTBEAT: 1
PALPITATIONS: 1

## 2024-04-03 NOTE — PATIENT INSTRUCTIONS
Continue same medications/treatment.  Patient educated on proper medication use.  Patient educated on risk factor modification.  Please bring any lab results from other providers/physicians to your next appointment.    Please bring all medicines, vitamins, and herbal supplements with you when you come to the office.    Prescriptions will not be filled unless you are compliant with your follow up appointments or have a follow up appointment scheduled as per instruction of your physician. Refills should be requested at the time of your visit.    INCREASE sotalol to 1.5 tablets (180mg) twice daily. Go home today after your appointment and take an extra 1/2 tablet for this morning's dose.   SCHEDULE in clinic EKG for tomorrow and Friday so we can monitor for any possible EKG changes with the increase in medication (sotalol)  Follow up with Jen or Dr. Jeong in 4-6 weeks    SHOLA LUZ RN, AM SCRIBING FOR, AND IN THE PRESENCE OF DR. ALON JEONG MD

## 2024-04-03 NOTE — PROGRESS NOTES
CARDIOLOGY OFFICE VISIT      CHIEF COMPLAINT  Chief Complaint   Patient presents with    Follow-up     1 year         HISTORY OF PRESENT ILLNESS  HPI  68-year-old  male who is followed for nonischemic cardiomyopathy with a left ventricular ejection fraction was depressed to 30%, improved to 60% per 2D echocardiogram dated 2021, New York Heart Association class II, stage B heart failure, persistent atrial fibrillation with periods of rapid ventricular response controlled on sotalol, magnesium oxide and beta-blockade and anticoagulated with Eliquis.    Patient states that he has been noticing having episodes of atrial fibrillation by his iWatch almost once a week.  Sometimes episodes are presenting with palpitations.  Sometimes asymptomatic.  Usually episodes last for almost a day.    Current EKG shows atrial fibrillation at a rate of 93 bpm QRS ration 96 ms QT corrected 492 ms.  Rhythm strip shows the same pattern.      Past Medical History  Past Medical History:   Diagnosis Date    Abnormal findings on diagnostic imaging of heart and coronary circulation     Elevated coronary artery calcium score    Benign prostatic hyperplasia without lower urinary tract symptoms     Enlarged prostate without lower urinary tract symptoms (luts)    Encounter for general adult medical examination without abnormal findings     Health care maintenance    Overweight 2022    Overweight    Personal history of other infectious and parasitic diseases     History of herpes simplex infection       Social History  Social History     Tobacco Use    Smoking status: Former     Types: Cigarettes     Quit date:      Years since quittin.2    Smokeless tobacco: Never   Substance Use Topics    Alcohol use: Yes     Comment: ocassionally    Drug use: Never       Family History     Family History   Problem Relation Name Age of Onset    Ulcerative colitis Mother      Colon cancer Mother      Colon cancer Father       Pancreatic cancer Father      Atrial fibrillation Brother      Ulcerative colitis Brother          Allergies:  Allergies   Allergen Reactions    Adhesive Tape-Silicones Unknown    Percocet [Oxycodone-Acetaminophen] Unknown        Outpatient Medications:  Current Outpatient Medications   Medication Instructions    acyclovir (Zovirax) 400 mg tablet oral, 2 times daily    amLODIPine (NORVASC) 10 mg, oral, Daily    apixaban (ELIQUIS) 5 mg, oral, 2 times daily    aspirin 81 mg EC tablet 1 tablet, oral, Daily    ergocalciferol (VITAMIN D-2) 1.25 mg, oral, Weekly    lisinopril 10 mg, oral, Daily    lovastatin (MEVACOR) 10 mg, oral, Daily    magnesium oxide (Mag-Ox) 250 mg magnesium tablet 1 tablet, oral, Daily    metoprolol succinate XL (Toprol-XL) 50 mg 24 hr tablet 1 tablet, oral, Nightly    nitroglycerin (NITROSTAT) 0.4 mg, sublingual, Every 5 min PRN    sildenafil (VIAGRA) 100 mg, oral, Daily PRN    sotalol (Betapace) 120 mg tablet 1 tablet, oral, 2 times daily          REVIEW OF SYSTEMS  Review of Systems   Cardiovascular:  Positive for irregular heartbeat and palpitations. Negative for chest pain.   All other systems reviewed and are negative.        VITALS  Vitals:    04/03/24 0959   BP: 134/84   Pulse: 93       PHYSICAL EXAM  Constitutional:       General: Awake.      Appearance: Normal and healthy appearance. Well-developed and not in distress.   Neck:      Vascular: No JVR. JVD normal.   Pulmonary:      Effort: Pulmonary effort is normal.      Breath sounds: Normal breath sounds. No wheezing. No rhonchi. No rales.   Chest:      Chest wall: Not tender to palpatation.   Cardiovascular:      PMI at left midclavicular line. Normal rate. Irregularly irregular rhythm. Normal S1. Normal S2.       Murmurs: There is no murmur.      No gallop.  No click. No rub.      Comments: Atrial fibrillation  Pulses:     Intact distal pulses.   Edema:     Peripheral edema absent.   Abdominal:      Tenderness: There is no abdominal  tenderness.   Musculoskeletal: Normal range of motion.         General: No tenderness. Skin:     General: Skin is warm and dry.   Neurological:      General: No focal deficit present.      Mental Status: Alert and oriented to person, place and time.           ASSESSMENT AND PLAN  Clinical impressions:  1. Nonischemic cardiomyopathy with a left ventricular ejection fraction once reduced to 30% per 2D echocardiogram dated October 19, 2021, improved to 60% per 2D echocardiogram dated December 13, 2021, New York Heart Association class II, stage B heart failure.  2. Persistent atrial fibrillation with periods of rapid ventricular response controlled on sotalol and beta-blockade and anticoagulated with Eliquis.  3. Hypertension, controlled   4. Coronary artery disease.  5. Dyslipidemia on statin.  6. Alcohol dependency.  7. Former tobacco addiction.  8. Overweight with a BMI of 28.24.    Plan-recommendations    I had a lengthy discussion with patient regarding management for atrial fibrillation.  Patient still have episodes of atrial fibrillation.  Will increase the dose of sotalol to 120 mg 1-1/2 tablets twice a day.    Patient will be presented to the office tomorrow or day after tomorrow for an EKG for evaluation of QT interval.    He will be followed my office in next 4 to 6 weeks for reevaluation of atrial fibrillation management.    Patient also understood that in case atrial fibrillation episodes are present more frequent, we will discuss possibility of pulmonary vein isolation.    Risk factor modification and lifestyle modification discussed with patient. Diet , exercise and hydration discussed with patient.    I have personally review with patient during this office visit, laboratory data, echocardiogram results, stress test results, Holter-event monitor results prior and after the last electrophysiology visit. All questions has been answered.    Please excuse any errors in grammar or translation related to this  dictation.  Voice recognition software was utilized to prepare this document.

## 2024-04-04 ENCOUNTER — ANCILLARY PROCEDURE (OUTPATIENT)
Dept: CARDIOLOGY | Facility: CLINIC | Age: 69
End: 2024-04-04
Payer: MEDICARE

## 2024-04-04 ENCOUNTER — OFFICE VISIT (OUTPATIENT)
Dept: PRIMARY CARE | Facility: CLINIC | Age: 69
End: 2024-04-04
Payer: MEDICARE

## 2024-04-04 VITALS
HEIGHT: 70 IN | BODY MASS INDEX: 28.58 KG/M2 | WEIGHT: 199.6 LBS | HEART RATE: 62 BPM | TEMPERATURE: 98.3 F | DIASTOLIC BLOOD PRESSURE: 82 MMHG | OXYGEN SATURATION: 96 % | SYSTOLIC BLOOD PRESSURE: 135 MMHG

## 2024-04-04 DIAGNOSIS — B00.9 HERPES SIMPLEX VIRUS INFECTION: ICD-10-CM

## 2024-04-04 DIAGNOSIS — R39.9 URINARY TRACT INFECTION SYMPTOMS: ICD-10-CM

## 2024-04-04 DIAGNOSIS — E78.2 MIXED DYSLIPIDEMIA: ICD-10-CM

## 2024-04-04 DIAGNOSIS — Z79.899 HIGH RISK MEDICATION USE: ICD-10-CM

## 2024-04-04 DIAGNOSIS — Z00.00 ENCOUNTER FOR SUBSEQUENT ANNUAL WELLNESS VISIT (AWV) IN MEDICARE PATIENT: Primary | ICD-10-CM

## 2024-04-04 DIAGNOSIS — I10 ESSENTIAL (PRIMARY) HYPERTENSION: ICD-10-CM

## 2024-04-04 DIAGNOSIS — E55.9 VITAMIN D INSUFFICIENCY: ICD-10-CM

## 2024-04-04 PROCEDURE — 1170F FXNL STATUS ASSESSED: CPT | Performed by: INTERNAL MEDICINE

## 2024-04-04 PROCEDURE — 1036F TOBACCO NON-USER: CPT | Performed by: INTERNAL MEDICINE

## 2024-04-04 PROCEDURE — G0439 PPPS, SUBSEQ VISIT: HCPCS | Performed by: INTERNAL MEDICINE

## 2024-04-04 PROCEDURE — 1159F MED LIST DOCD IN RCRD: CPT | Performed by: INTERNAL MEDICINE

## 2024-04-04 PROCEDURE — 3008F BODY MASS INDEX DOCD: CPT | Performed by: INTERNAL MEDICINE

## 2024-04-04 PROCEDURE — 3079F DIAST BP 80-89 MM HG: CPT | Performed by: INTERNAL MEDICINE

## 2024-04-04 PROCEDURE — 3075F SYST BP GE 130 - 139MM HG: CPT | Performed by: INTERNAL MEDICINE

## 2024-04-04 RX ORDER — ACYCLOVIR 400 MG/1
400 TABLET ORAL 2 TIMES DAILY
Qty: 20 TABLET | Refills: 1 | Status: SHIPPED | OUTPATIENT
Start: 2024-04-04 | End: 2024-04-24

## 2024-04-04 ASSESSMENT — ACTIVITIES OF DAILY LIVING (ADL)
DOING_HOUSEWORK: INDEPENDENT
TAKING_MEDICATION: INDEPENDENT
DRESSING: INDEPENDENT
MANAGING_FINANCES: INDEPENDENT
GROCERY_SHOPPING: INDEPENDENT
BATHING: INDEPENDENT

## 2024-04-04 ASSESSMENT — ENCOUNTER SYMPTOMS
DEPRESSION: 0
LOSS OF SENSATION IN FEET: 0
OCCASIONAL FEELINGS OF UNSTEADINESS: 0

## 2024-04-04 NOTE — PROGRESS NOTES
Patient here today for EKG due to medication adjustment. Patient's Sotalol was increased to 120mg 1 1/2 tablets twice daily. Per Jen Felix, CNP EKG is good, patient to continue current medications as prescribed and follow up as scheduled

## 2024-04-05 ENCOUNTER — ANCILLARY PROCEDURE (OUTPATIENT)
Dept: CARDIOLOGY | Facility: CLINIC | Age: 69
End: 2024-04-05
Payer: MEDICARE

## 2024-04-05 DIAGNOSIS — Z79.899 HIGH RISK MEDICATION USE: ICD-10-CM

## 2024-04-05 NOTE — PROGRESS NOTES
Pt here for EKG ordered by Dr. Calle for afib, medication change. Per Dr. Calle, pt in NSR and measurements ok. Pt to continue same medications

## 2024-04-05 NOTE — PROGRESS NOTES
"Thomas Lay is otherwise doing well. Chronic medical conditions are stable with current medical regimen. Cognitive functions are intact and stable. Immunizations are age appropriately up-to-date. Today patient does not have any acute medical complaint. We reconciled home medications. . Discussed about the preventative dwight like cancer screening, routine blood work, immunizations. The healthy lifestyle has been reinforced. Encouraged continued avoidance of tobacco alcohol substances of abuse. Functional capacity has been assessed. The depression screening questionnaire has been reviewed. Discussed safety measures and advanced directives, Med refills were sent.  Vital signs reviewed.  The due lab orders, if any were provided.  All the other components of annual wellness has been further narrated below. Patient will return for follow up as per schedule.       Review of Systems    Visit Vitals  /82   Pulse 62   Temp 36.8 °C (98.3 °F) (Temporal)   Ht 1.778 m (5' 10\")   Wt 90.5 kg (199 lb 9.6 oz)   SpO2 96% Comment: RA   BMI 28.64 kg/m²   Smoking Status Former   BSA 2.11 m²           Wt Readings from Last 10 Encounters:   04/04/24 90.5 kg (199 lb 9.6 oz)   04/03/24 86.2 kg (190 lb)   11/28/23 88 kg (194 lb)   08/29/23 88 kg (194 lb)   07/27/23 89.4 kg (197 lb)   07/21/23 88 kg (194 lb)   03/23/23 83 kg (183 lb)   02/01/23 86.6 kg (191 lb)   01/05/23 86.4 kg (190 lb 7 oz)   11/17/22 83.5 kg (184 lb)        Physical Exam  Vitals and nursing note reviewed.   Constitutional:       Appearance: Normal appearance.   HENT:      Head: Normocephalic.      Right Ear: Tympanic membrane normal.      Left Ear: Tympanic membrane normal.      Nose: Nose normal.      Mouth/Throat:      Mouth: Mucous membranes are moist.   Cardiovascular:      Rate and Rhythm: Normal rate and regular rhythm.      Pulses: Normal pulses.      Heart sounds: No murmur heard.  Pulmonary:      Effort: No respiratory distress.      Breath sounds: " Normal breath sounds.   Abdominal:      Palpations: Abdomen is soft.   Musculoskeletal:      Cervical back: Neck supple.      Right lower leg: No edema.      Left lower leg: No edema.   Skin:     General: Skin is warm.      Findings: No rash.   Neurological:      General: No focal deficit present.      Mental Status: He is alert and oriented to person, place, and time.   Psychiatric:         Mood and Affect: Mood normal.            RECENT LABS:  Lab Results   Component Value Date    WBC 6.6 09/06/2023    HGB 14.2 09/06/2023    HCT 42.7 09/06/2023     09/06/2023    CHOL 150 09/06/2023    TRIG 126 09/06/2023    HDL 55.2 09/06/2023    ALT 17 09/06/2023    AST 19 09/06/2023     09/06/2023    K 4.3 09/06/2023     (H) 09/06/2023    CREATININE 1.02 09/06/2023    BUN 20 09/06/2023    CO2 26 09/06/2023    TSH 1.83 09/06/2023    PSA 3.16 09/06/2023    INR 1.3 (H) 11/05/2021    HGBA1C 5.2 08/11/2022       IMAGING:  ECG 12 lead (Clinic Performed)    Result Date: 4/3/2024  EKG shows atrial fibrillation at a rate of 93 bpm QRS ration 96 ms QT corrected 492 ms.  Rhythm strip shows the same pattern.       MEDICATIONS:   Current Outpatient Medications   Medication Instructions    acyclovir (ZOVIRAX) 400 mg, oral, 2 times daily    amLODIPine (NORVASC) 10 mg, oral, Daily    apixaban (ELIQUIS) 5 mg, oral, 2 times daily    aspirin 81 mg EC tablet 1 tablet, oral, Daily    ergocalciferol (VITAMIN D-2) 1.25 mg, oral, Weekly    lisinopril 10 mg, oral, Daily    lovastatin (MEVACOR) 10 mg, oral, Daily    magnesium oxide (Mag-Ox) 250 mg magnesium tablet 1 tablet, oral, Daily    metoprolol succinate XL (TOPROL-XL) 50 mg, oral, Nightly    nitroglycerin (NITROSTAT) 0.4 mg, sublingual, Every 5 min PRN    sildenafil (VIAGRA) 100 mg, oral, Daily PRN    sotalol (BETAPACE) 180 mg, oral, 2 times daily        TODAY'S VISIT  DX:   1. Encounter for subsequent annual wellness visit (AWV) in Medicare patient  Comprehensive Metabolic Panel     Lipid Panel    Prostate Specific Antigen    TSH with reflex to Free T4 if abnormal    Urinalysis with Reflex Microscopic      2. Herpes simplex virus infection  acyclovir (Zovirax) 400 mg tablet      3. Essential (primary) hypertension  CBC and Auto Differential      4. Mixed dyslipidemia  Lipid Panel      5. Vitamin D insufficiency  Vitamin D 25-Hydroxy,Total (for eval of Vitamin D levels)      6. Urinary tract infection symptoms  Urinalysis with Reflex Microscopic         MEDICAL DECISION MAKING:   Recent lab work and relevant imaging studies have been reviewed.  Relevant correspondence/notes from specialty consultants were reviewed and discussed with patient.  The current active medical co morbidities have been considered.  Patient's cancer screening tests are up-to-date.  Medication have been sent for refill.  Patient will continue with current medical therapy and plan.  Immunizations are up-to-date.  Relevant orders are in the chart for this visit.  I will see patient back in 6 months.      PS: This note was completed using Dragon voice recognition technology and may include unintended errors with respect to translation of words, typographical errors or grammar errors which may not have been identified while finalizing the chart.

## 2024-04-30 ENCOUNTER — TELEPHONE (OUTPATIENT)
Dept: PRIMARY CARE | Facility: CLINIC | Age: 69
End: 2024-04-30
Payer: MEDICARE

## 2024-04-30 NOTE — TELEPHONE ENCOUNTER
"Call to pt to discuss Moderate-Intensity Statins. Pt voiced understanding will consider at next office visit states his \"total cholesterol levels are below 200. Prefers less is best for medications to prevent side effects from statins\".  "

## 2024-05-08 ENCOUNTER — OFFICE VISIT (OUTPATIENT)
Dept: CARDIOLOGY | Facility: CLINIC | Age: 69
End: 2024-05-08
Payer: MEDICARE

## 2024-05-08 VITALS
SYSTOLIC BLOOD PRESSURE: 122 MMHG | BODY MASS INDEX: 28.06 KG/M2 | WEIGHT: 196 LBS | HEIGHT: 70 IN | DIASTOLIC BLOOD PRESSURE: 74 MMHG | HEART RATE: 62 BPM

## 2024-05-08 DIAGNOSIS — Z87.891 FORMER CIGARETTE SMOKER: ICD-10-CM

## 2024-05-08 DIAGNOSIS — I42.0 DILATED CARDIOMYOPATHY (MULTI): ICD-10-CM

## 2024-05-08 DIAGNOSIS — E78.2 MIXED HYPERLIPIDEMIA: ICD-10-CM

## 2024-05-08 DIAGNOSIS — I48.0 PAROXYSMAL ATRIAL FIBRILLATION (MULTI): Primary | ICD-10-CM

## 2024-05-08 DIAGNOSIS — I10 ESSENTIAL HYPERTENSION: ICD-10-CM

## 2024-05-08 DIAGNOSIS — I25.10 CORONARY ARTERY DISEASE INVOLVING NATIVE CORONARY ARTERY OF NATIVE HEART WITHOUT ANGINA PECTORIS: ICD-10-CM

## 2024-05-08 DIAGNOSIS — I50.42 CHRONIC COMBINED SYSTOLIC AND DIASTOLIC CONGESTIVE HEART FAILURE, NYHA CLASS 2 (MULTI): ICD-10-CM

## 2024-05-08 PROCEDURE — 3008F BODY MASS INDEX DOCD: CPT | Performed by: NURSE PRACTITIONER

## 2024-05-08 PROCEDURE — 99213 OFFICE O/P EST LOW 20 MIN: CPT | Performed by: NURSE PRACTITIONER

## 2024-05-08 PROCEDURE — 3078F DIAST BP <80 MM HG: CPT | Performed by: NURSE PRACTITIONER

## 2024-05-08 PROCEDURE — 1160F RVW MEDS BY RX/DR IN RCRD: CPT | Performed by: NURSE PRACTITIONER

## 2024-05-08 PROCEDURE — 3074F SYST BP LT 130 MM HG: CPT | Performed by: NURSE PRACTITIONER

## 2024-05-08 PROCEDURE — 1036F TOBACCO NON-USER: CPT | Performed by: NURSE PRACTITIONER

## 2024-05-08 PROCEDURE — 1159F MED LIST DOCD IN RCRD: CPT | Performed by: NURSE PRACTITIONER

## 2024-05-08 NOTE — PROGRESS NOTES
"CARDIOLOGY OFFICE VISIT      CHIEF COMPLAINT  Chief Complaint   Patient presents with    Atrial Fibrillation     Chief complaint: \"I have been doing well since the adjustment in my sotalol.\"  HISTORY OF PRESENT ILLNESS  HPI  History: The patient is a 68-year-old  male who is followed for nonischemic cardiomyopathy with a left ventricular ejection fraction was reduced to 30%, improved to 60% per 2D echocardiogram dated December 13, 2021, New York Heart Association class II, stage B heart failure, persistent atrial fibrillation with periods of rapid ventricular response on sotalol, magnesium oxide, and beta-blockade and anticoagulated with Eliquis.  Review of the medical records reveals patient was seen by Dr. Calle on April 3, 2024 at which time the sotalol dose was increased from 120 mg twice a day to 180 mg twice a day for complaints of palpitations occurring 1 time per week.  The patient states since undergoing the adjustment in his medication his palpitations are under good control.  He denies chest pain, palpitations, dizziness, lightheadedness, shortness of breath, abdominal distention, or lower extremity edema.  Patient does wear compression stockings.  Past Medical History  Past Medical History:   Diagnosis Date    Abnormal ECG     Abnormal findings on diagnostic imaging of heart and coronary circulation     Elevated coronary artery calcium score    Atrial fibrillation (Multi)     Benign prostatic hyperplasia without lower urinary tract symptoms     Enlarged prostate without lower urinary tract symptoms (luts)    Encounter for general adult medical examination without abnormal findings     Health care maintenance    Heart disease     Hypertension     Overweight 01/26/2022    Overweight    Personal history of other infectious and parasitic diseases     History of herpes simplex infection    Visual impairment        Social History  Social History     Tobacco Use    Smoking status: Former     Current " packs/day: 0.00     Average packs/day: 1 pack/day for 27.7 years (27.7 ttl pk-yrs)     Types: Cigarettes     Start date: 1970     Quit date: 1997     Years since quittin.6    Smokeless tobacco: Never   Vaping Use    Vaping status: Never Used   Substance Use Topics    Alcohol use: Yes     Alcohol/week: 4.0 standard drinks of alcohol     Types: 4 Shots of liquor per week     Comment: ocassionally    Drug use: Never       Family History     Family History   Problem Relation Name Age of Onset    Ulcerative colitis Mother Renee Lay     Colon cancer Mother Renee Lay     Cancer Mother Renee Lay     Colon cancer Father Ulysses G Sullenberger     Pancreatic cancer Father Ulysses G Sullenberger     Cancer Father Ulysses G Sullenberger     Atrial fibrillation Brother Butch Lay     Ulcerative colitis Brother Butch Lay     Cancer Brother Butch Lay         Allergies:  Allergies   Allergen Reactions    Adhesive Tape-Silicones Unknown    Percocet [Oxycodone-Acetaminophen] Unknown        Outpatient Medications:  Current Outpatient Medications   Medication Instructions    amLODIPine (NORVASC) 10 mg, oral, Daily    apixaban (ELIQUIS) 5 mg, oral, 2 times daily    aspirin 81 mg EC tablet 1 tablet, oral, Daily    ergocalciferol (VITAMIN D-2) 1.25 mg, oral, Once Weekly    lisinopril 10 mg, oral, Daily    lovastatin (MEVACOR) 10 mg, oral, Daily    magnesium oxide (Mag-Ox) 250 mg magnesium tablet 1 tablet, oral, Daily    metoprolol succinate XL (TOPROL-XL) 50 mg, oral, Nightly    nitroglycerin (NITROSTAT) 0.4 mg, sublingual, Every 5 min PRN    sildenafil (VIAGRA) 100 mg, oral, Daily PRN    sotalol (BETAPACE) 180 mg, oral, 2 times daily          REVIEW OF SYSTEMS  Review of Systems   All other systems reviewed and are negative.        VITALS  Vitals:    24 0952   BP: 122/74   Pulse: 62       PHYSICAL EXAM  Vitals and nursing note reviewed.    Constitutional:       Appearance: Normal appearance.   HENT:      Head: Normocephalic.   Neck:      Vascular: No JVD.   Cardiovascular:      Rate and Rhythm: Normal rate and regular rhythm.      Pulses: Normal pulses.      Heart sounds: Normal heart sounds.   Pulmonary:      Effort: Pulmonary effort is normal.      Breath sounds: Normal breath sounds.   Abdominal:      General: Bowel sounds are normal.      Palpations: Abdomen is soft.   Musculoskeletal:         General: Normal range of motion.      Cervical back: Normal range of motion.   Skin:     General: Skin is warm and dry.   Neurological:      General: No focal deficit present.      Mental Status: She is alert and oriented to person, place, and time.      Motor: Motor function is intact.   Psychiatric:         Attention and Perception: Attention and perception normal.         Mood and Affect: Mood and affect normal.         Speech: Speech normal.         Behavior: Behavior normal. Behavior is cooperative.         Thought Content: Thought content normal.         Cognition and Memory: Cognition and memory normal.     Labs and testing: Twelve-lead EKG reveals sinus rhythm without ectopics no acute ischemic changes.  QRS durations 94 ms,  ms, QTc 440 ms.      ASSESSMENT AND PLAN    Clinical impressions:  1. Nonischemic cardiomyopathy with a left ventricular ejection fraction once reduced to 30% per 2D echocardiogram dated October 19, 2021, improved to 60% per 2D echocardiogram dated December 13, 2021, New York Heart Association class II, stage B heart failure.  2. Persistent atrial fibrillation with periods of rapid ventricular response controlled on sotalol and beta-blockade and anticoagulated with Eliquis.  3. Hypertension, controlled with a blood pressure today of 122/74.  4. Coronary artery disease.  5. Dyslipidemia on statin.  6. Alcohol dependency.  7. Former tobacco addiction.  8. Overweight with a BMI of 28.12.    Recommendations:  1.  Continue  current medications as prescribed.  2.  Continue lifestyle modifications as discussed.  Patient was encouraged to increase water consumption to 64 ounces per day and limit Caffeine and alcohol consumption to 2 servings per day.  He reports that he is physically active.  3.  Follow-up in office with Dr. Calle in 6 months or sooner if needed.  4.  Follow-up in office with Dr. Dupree on January 17, 2025 at 10 AM as scheduled or sooner if needed.    Evaluation and note by Jen Felix, CNP  **Please excuse any errors in grammar or translation related to this dictation.  Voice recognition software was utilized to prepare this document.**

## 2024-07-16 DIAGNOSIS — E78.2 MIXED HYPERLIPIDEMIA: ICD-10-CM

## 2024-07-16 NOTE — TELEPHONE ENCOUNTER
4/4/2024    [id number] - [place] - [address] AT [facility] [address]  [address] [place] [address]  Phone: [phone] Fax: [phone]    Next office visit 8/14/2024

## 2024-07-18 RX ORDER — LOVASTATIN 10 MG/1
10 TABLET ORAL DAILY
Qty: 90 TABLET | Refills: 1 | Status: SHIPPED | OUTPATIENT
Start: 2024-07-18

## 2024-07-29 DIAGNOSIS — I10 ESSENTIAL HYPERTENSION: ICD-10-CM

## 2024-07-29 RX ORDER — LOSARTAN POTASSIUM 25 MG/1
25 TABLET ORAL DAILY
Qty: 90 TABLET | Refills: 3 | Status: SHIPPED | OUTPATIENT
Start: 2024-07-29 | End: 2025-07-29

## 2024-07-29 NOTE — TELEPHONE ENCOUNTER
Patient was in the office with his wife for her appointment and is having issues with cough on Lisinopril.  Per Dr. Scotty Dupree ,  switch Lisinopril to Losartan 25 mg.

## 2024-07-30 ENCOUNTER — LAB (OUTPATIENT)
Dept: LAB | Facility: LAB | Age: 69
End: 2024-07-30
Payer: MEDICARE

## 2024-07-30 DIAGNOSIS — R39.9 URINARY TRACT INFECTION SYMPTOMS: ICD-10-CM

## 2024-07-30 DIAGNOSIS — I10 ESSENTIAL (PRIMARY) HYPERTENSION: ICD-10-CM

## 2024-07-30 DIAGNOSIS — E55.9 VITAMIN D INSUFFICIENCY: ICD-10-CM

## 2024-07-30 DIAGNOSIS — Z00.00 ENCOUNTER FOR SUBSEQUENT ANNUAL WELLNESS VISIT (AWV) IN MEDICARE PATIENT: ICD-10-CM

## 2024-07-30 DIAGNOSIS — E78.2 MIXED DYSLIPIDEMIA: ICD-10-CM

## 2024-07-30 LAB
25(OH)D3 SERPL-MCNC: 40 NG/ML (ref 30–100)
ALBUMIN SERPL BCP-MCNC: 4.7 G/DL (ref 3.4–5)
ALP SERPL-CCNC: 72 U/L (ref 33–136)
ALT SERPL W P-5'-P-CCNC: 13 U/L (ref 10–52)
ANION GAP SERPL CALC-SCNC: 11 MMOL/L (ref 10–20)
APPEARANCE UR: CLEAR
AST SERPL W P-5'-P-CCNC: 16 U/L (ref 9–39)
BASOPHILS # BLD AUTO: 0.07 X10*3/UL (ref 0–0.1)
BASOPHILS NFR BLD AUTO: 1.1 %
BILIRUB SERPL-MCNC: 0.8 MG/DL (ref 0–1.2)
BILIRUB UR STRIP.AUTO-MCNC: NEGATIVE MG/DL
BUN SERPL-MCNC: 13 MG/DL (ref 6–23)
CALCIUM SERPL-MCNC: 9.3 MG/DL (ref 8.6–10.3)
CHLORIDE SERPL-SCNC: 106 MMOL/L (ref 98–107)
CHOLEST SERPL-MCNC: 170 MG/DL (ref 0–199)
CHOLESTEROL/HDL RATIO: 2.9
CO2 SERPL-SCNC: 28 MMOL/L (ref 21–32)
COLOR UR: NORMAL
CREAT SERPL-MCNC: 1.05 MG/DL (ref 0.5–1.3)
EGFRCR SERPLBLD CKD-EPI 2021: 77 ML/MIN/1.73M*2
EOSINOPHIL # BLD AUTO: 0.27 X10*3/UL (ref 0–0.7)
EOSINOPHIL NFR BLD AUTO: 4.2 %
ERYTHROCYTE [DISTWIDTH] IN BLOOD BY AUTOMATED COUNT: 12.6 % (ref 11.5–14.5)
GLUCOSE SERPL-MCNC: 117 MG/DL (ref 74–99)
GLUCOSE UR STRIP.AUTO-MCNC: NORMAL MG/DL
HCT VFR BLD AUTO: 46.5 % (ref 41–52)
HDLC SERPL-MCNC: 57.8 MG/DL
HGB BLD-MCNC: 15.3 G/DL (ref 13.5–17.5)
IMM GRANULOCYTES # BLD AUTO: 0.02 X10*3/UL (ref 0–0.7)
IMM GRANULOCYTES NFR BLD AUTO: 0.3 % (ref 0–0.9)
KETONES UR STRIP.AUTO-MCNC: NEGATIVE MG/DL
LDLC SERPL CALC-MCNC: 77 MG/DL
LEUKOCYTE ESTERASE UR QL STRIP.AUTO: NEGATIVE
LYMPHOCYTES # BLD AUTO: 1.94 X10*3/UL (ref 1.2–4.8)
LYMPHOCYTES NFR BLD AUTO: 30.2 %
MCH RBC QN AUTO: 29.4 PG (ref 26–34)
MCHC RBC AUTO-ENTMCNC: 32.9 G/DL (ref 32–36)
MCV RBC AUTO: 89 FL (ref 80–100)
MONOCYTES # BLD AUTO: 0.63 X10*3/UL (ref 0.1–1)
MONOCYTES NFR BLD AUTO: 9.8 %
NEUTROPHILS # BLD AUTO: 3.49 X10*3/UL (ref 1.2–7.7)
NEUTROPHILS NFR BLD AUTO: 54.4 %
NITRITE UR QL STRIP.AUTO: NEGATIVE
NON HDL CHOLESTEROL: 112 MG/DL (ref 0–149)
NRBC BLD-RTO: 0 /100 WBCS (ref 0–0)
PH UR STRIP.AUTO: 7 [PH]
PLATELET # BLD AUTO: 280 X10*3/UL (ref 150–450)
POTASSIUM SERPL-SCNC: 5.2 MMOL/L (ref 3.5–5.3)
PROT SERPL-MCNC: 7.3 G/DL (ref 6.4–8.2)
PROT UR STRIP.AUTO-MCNC: NEGATIVE MG/DL
PSA SERPL-MCNC: 3.4 NG/ML
RBC # BLD AUTO: 5.21 X10*6/UL (ref 4.5–5.9)
RBC # UR STRIP.AUTO: NEGATIVE /UL
SODIUM SERPL-SCNC: 140 MMOL/L (ref 136–145)
SP GR UR STRIP.AUTO: 1.01
TRIGL SERPL-MCNC: 175 MG/DL (ref 0–149)
TSH SERPL-ACNC: 2.14 MIU/L (ref 0.44–3.98)
UROBILINOGEN UR STRIP.AUTO-MCNC: NORMAL MG/DL
VLDL: 35 MG/DL (ref 0–40)
WBC # BLD AUTO: 6.4 X10*3/UL (ref 4.4–11.3)

## 2024-07-30 PROCEDURE — 82306 VITAMIN D 25 HYDROXY: CPT

## 2024-07-30 PROCEDURE — 80061 LIPID PANEL: CPT

## 2024-07-30 PROCEDURE — 84153 ASSAY OF PSA TOTAL: CPT

## 2024-07-30 PROCEDURE — 81003 URINALYSIS AUTO W/O SCOPE: CPT

## 2024-07-30 PROCEDURE — 85025 COMPLETE CBC W/AUTO DIFF WBC: CPT

## 2024-07-30 PROCEDURE — 84443 ASSAY THYROID STIM HORMONE: CPT

## 2024-07-30 PROCEDURE — 36415 COLL VENOUS BLD VENIPUNCTURE: CPT

## 2024-07-30 PROCEDURE — 80053 COMPREHEN METABOLIC PANEL: CPT

## 2024-08-14 ENCOUNTER — APPOINTMENT (OUTPATIENT)
Dept: PRIMARY CARE | Facility: CLINIC | Age: 69
End: 2024-08-14
Payer: MEDICARE

## 2024-08-14 VITALS
BODY MASS INDEX: 28.63 KG/M2 | WEIGHT: 200 LBS | HEIGHT: 70 IN | HEART RATE: 63 BPM | DIASTOLIC BLOOD PRESSURE: 80 MMHG | TEMPERATURE: 98.5 F | OXYGEN SATURATION: 96 % | SYSTOLIC BLOOD PRESSURE: 152 MMHG

## 2024-08-14 DIAGNOSIS — I10 BENIGN ESSENTIAL HYPERTENSION: ICD-10-CM

## 2024-08-14 DIAGNOSIS — I48.0 PAROXYSMAL ATRIAL FIBRILLATION (MULTI): ICD-10-CM

## 2024-08-14 DIAGNOSIS — R73.9 HYPERGLYCEMIA: ICD-10-CM

## 2024-08-14 DIAGNOSIS — K40.90 NON-RECURRENT UNILATERAL INGUINAL HERNIA WITHOUT OBSTRUCTION OR GANGRENE: Primary | ICD-10-CM

## 2024-08-14 LAB — POC HEMOGLOBIN A1C: 5.5 % (ref 4.2–6.5)

## 2024-08-14 PROCEDURE — 83036 HEMOGLOBIN GLYCOSYLATED A1C: CPT | Performed by: INTERNAL MEDICINE

## 2024-08-14 PROCEDURE — 3008F BODY MASS INDEX DOCD: CPT | Performed by: INTERNAL MEDICINE

## 2024-08-14 PROCEDURE — 1124F ACP DISCUSS-NO DSCNMKR DOCD: CPT | Performed by: INTERNAL MEDICINE

## 2024-08-14 PROCEDURE — 1036F TOBACCO NON-USER: CPT | Performed by: INTERNAL MEDICINE

## 2024-08-14 PROCEDURE — 99214 OFFICE O/P EST MOD 30 MIN: CPT | Performed by: INTERNAL MEDICINE

## 2024-08-14 PROCEDURE — 1159F MED LIST DOCD IN RCRD: CPT | Performed by: INTERNAL MEDICINE

## 2024-08-14 PROCEDURE — 1160F RVW MEDS BY RX/DR IN RCRD: CPT | Performed by: INTERNAL MEDICINE

## 2024-08-14 PROCEDURE — 3077F SYST BP >= 140 MM HG: CPT | Performed by: INTERNAL MEDICINE

## 2024-08-14 PROCEDURE — 3079F DIAST BP 80-89 MM HG: CPT | Performed by: INTERNAL MEDICINE

## 2024-08-14 RX ORDER — AMLODIPINE BESYLATE 10 MG/1
10 TABLET ORAL DAILY
Qty: 90 TABLET | Refills: 1 | Status: SHIPPED | OUTPATIENT
Start: 2024-08-14

## 2024-08-14 RX ORDER — CHOLECALCIFEROL (VITAMIN D3) 125 MCG
125 CAPSULE ORAL DAILY
COMMUNITY

## 2024-08-14 ASSESSMENT — PATIENT HEALTH QUESTIONNAIRE - PHQ9
SUM OF ALL RESPONSES TO PHQ9 QUESTIONS 1 AND 2: 0
2. FEELING DOWN, DEPRESSED OR HOPELESS: NOT AT ALL
1. LITTLE INTEREST OR PLEASURE IN DOING THINGS: NOT AT ALL

## 2024-08-14 ASSESSMENT — ENCOUNTER SYMPTOMS
DYSURIA: 0
LIGHT-HEADEDNESS: 0
MYALGIAS: 0
ACTIVITY CHANGE: 0
ABDOMINAL PAIN: 0
SORE THROAT: 0
COUGH: 0

## 2024-08-14 NOTE — PROGRESS NOTES
kayley Devlin 69 y.o. male was seen today:   Chief Complaint   Patient presents with    4 month Follow-up     Lab results  Cardiology-New medication prescribed        VISIT MILDRED:  Patient is here to discuss about recent blood work.  We reviewed his lab results.  Everything appears to be within the normal limit except his blood sugar was still high.  We checked his A1c.  Which was 5.5%.  Patient has a left indirect inguinal hernia.  He is asking for a consultation with general surgeon.  He does not have any aches and pains.  However he feels uneasy there and worried about strangulation of the hernia.  Blood pressure is well-controlled.  He needs his medications refilled.  He is in paroxysmal atrial fibrillation follows with electrophysiologist.  Currently on Eliquis and sotalol.  Patient does not have any other acute medical complaint.      TODAY'S VISIT  DX:     1. Non-recurrent unilateral inguinal hernia without obstruction or gangrene  Referral to General Surgery      2. Benign essential hypertension  amLODIPine (Norvasc) 10 mg tablet      3. Hyperglycemia   A1c today in the office is 5.5%.      4. Paroxysmal atrial fibrillation (Multi)  On sotalol and Eliquis.  No abnormal bleeding           MEDICAL DECISION MAKING:  - The current and active medical co morbidities have been considered.   - Recent lab work and relevant imaging studies have been reviewed.    - Relevant correspondence/notes from other specialty consultants were reviewed.    - Medication have been sent for refill.    - Therapy and treatment as per above plan   - Next Follow up in 6-month      Review of Systems   Constitutional:  Negative for activity change.   HENT:  Negative for congestion and sore throat.    Respiratory:  Negative for cough.    Cardiovascular:  Negative for chest pain.   Gastrointestinal:  Negative for abdominal pain.   Endocrine: Negative for polyuria.   Genitourinary:  Negative for dysuria.   Musculoskeletal:   "Negative for myalgias.   Skin:  Negative for rash.   Neurological:  Negative for light-headedness.   Psychiatric/Behavioral:  Negative for behavioral problems.      Visit Vitals  /80 (BP Location: Left arm, Patient Position: Sitting)   Pulse 63   Temp 36.9 °C (98.5 °F)   Ht 1.778 m (5' 10\")   Wt 90.7 kg (200 lb)   SpO2 96% Comment: RA   BMI 28.70 kg/m²   Smoking Status Former   BSA 2.12 m²         Wt Readings from Last 10 Encounters:   08/14/24 90.7 kg (200 lb)   05/08/24 88.9 kg (196 lb)   04/04/24 90.5 kg (199 lb 9.6 oz)   04/03/24 86.2 kg (190 lb)   11/28/23 88 kg (194 lb)   08/29/23 88 kg (194 lb)   07/27/23 89.4 kg (197 lb)   07/21/23 88 kg (194 lb)   03/23/23 83 kg (183 lb)   02/01/23 86.6 kg (191 lb)     Physical Exam  Vitals and nursing note reviewed.   Constitutional:       Appearance: Normal appearance.   HENT:      Head: Normocephalic.      Right Ear: Tympanic membrane normal.      Left Ear: Tympanic membrane normal.      Nose: Nose normal.      Mouth/Throat:      Mouth: Mucous membranes are moist.   Cardiovascular:      Rate and Rhythm: Normal rate and regular rhythm.      Pulses: Normal pulses.      Heart sounds: No murmur heard.  Pulmonary:      Effort: No respiratory distress.      Breath sounds: Normal breath sounds.   Abdominal:      Palpations: Abdomen is soft.   Musculoskeletal:      Cervical back: Neck supple.      Right lower leg: No edema.      Left lower leg: No edema.   Skin:     General: Skin is warm.      Findings: No rash.   Neurological:      General: No focal deficit present.      Mental Status: He is alert and oriented to person, place, and time.   Psychiatric:         Mood and Affect: Mood normal.          RECENT LABS:  Lab Results   Component Value Date    WBC 6.4 07/30/2024    HGB 15.3 07/30/2024    HCT 46.5 07/30/2024     07/30/2024    CHOL 170 07/30/2024    TRIG 175 (H) 07/30/2024    HDL 57.8 07/30/2024    ALT 13 07/30/2024    AST 16 07/30/2024     07/30/2024    " K 5.2 07/30/2024     07/30/2024    CREATININE 1.05 07/30/2024    BUN 13 07/30/2024    CO2 28 07/30/2024    TSH 2.14 07/30/2024    PSA 3.40 07/30/2024    INR 1.3 (H) 11/05/2021    HGBA1C 5.5 08/14/2024     Lab Results   Component Value Date    GLUCOSE 117 (H) 07/30/2024    CALCIUM 9.3 07/30/2024     07/30/2024    K 5.2 07/30/2024    CO2 28 07/30/2024     07/30/2024    BUN 13 07/30/2024    CREATININE 1.05 07/30/2024      Lab Results   Component Value Date    LDLCALC 77 07/30/2024     Lab Results   Component Value Date    HGBA1C 5.5 08/14/2024       MEDICATIONS:   Current Outpatient Medications   Medication Instructions    amLODIPine (NORVASC) 10 mg, oral, Daily    apixaban (ELIQUIS) 5 mg, oral, 2 times daily    aspirin 81 mg EC tablet 1 tablet, oral, Daily    cholecalciferol (VITAMIN D-3) 125 mcg, oral, Daily    losartan (COZAAR) 25 mg, oral, Daily    lovastatin (MEVACOR) 10 mg, oral, Daily    magnesium oxide (Mag-Ox) 250 mg magnesium tablet 1 tablet, oral, Daily    metoprolol succinate XL (TOPROL-XL) 50 mg, oral, Nightly    nitroglycerin (NITROSTAT) 0.4 mg, sublingual, Every 5 min PRN    sildenafil (VIAGRA) 100 mg, oral, Daily PRN    sotalol (BETAPACE) 180 mg, oral, 2 times daily

## 2024-08-16 DIAGNOSIS — I48.0 PAROXYSMAL ATRIAL FIBRILLATION (MULTI): ICD-10-CM

## 2024-08-16 RX ORDER — APIXABAN 5 MG/1
5 TABLET, FILM COATED ORAL 2 TIMES DAILY
Qty: 180 TABLET | Refills: 3 | Status: SHIPPED | OUTPATIENT
Start: 2024-08-16

## 2024-08-16 NOTE — TELEPHONE ENCOUNTER
Received request for prescription refills for patient.   Patient follows with Dr. Calle and Dr. Dupree     Request is for Eliquis 5mg BID  Is patient currently on medication yes    Last OV 5/8/24  Next OV 11/6/24    Pended for signing and sent to provider

## 2024-08-29 ENCOUNTER — APPOINTMENT (OUTPATIENT)
Dept: SURGERY | Facility: CLINIC | Age: 69
End: 2024-08-29
Payer: MEDICARE

## 2024-09-09 ENCOUNTER — APPOINTMENT (OUTPATIENT)
Dept: SURGERY | Facility: CLINIC | Age: 69
End: 2024-09-09
Payer: MEDICARE

## 2024-09-09 VITALS
WEIGHT: 201 LBS | HEIGHT: 70 IN | SYSTOLIC BLOOD PRESSURE: 139 MMHG | BODY MASS INDEX: 28.77 KG/M2 | DIASTOLIC BLOOD PRESSURE: 83 MMHG | HEART RATE: 61 BPM

## 2024-09-09 DIAGNOSIS — K40.90 NON-RECURRENT UNILATERAL INGUINAL HERNIA WITHOUT OBSTRUCTION OR GANGRENE: ICD-10-CM

## 2024-09-09 PROCEDURE — 3008F BODY MASS INDEX DOCD: CPT | Performed by: SURGERY

## 2024-09-09 PROCEDURE — 3075F SYST BP GE 130 - 139MM HG: CPT | Performed by: SURGERY

## 2024-09-09 PROCEDURE — 1159F MED LIST DOCD IN RCRD: CPT | Performed by: SURGERY

## 2024-09-09 PROCEDURE — 99204 OFFICE O/P NEW MOD 45 MIN: CPT | Performed by: SURGERY

## 2024-09-09 PROCEDURE — 1036F TOBACCO NON-USER: CPT | Performed by: SURGERY

## 2024-09-09 PROCEDURE — 1160F RVW MEDS BY RX/DR IN RCRD: CPT | Performed by: SURGERY

## 2024-09-09 PROCEDURE — 3079F DIAST BP 80-89 MM HG: CPT | Performed by: SURGERY

## 2024-09-09 PROCEDURE — 1123F ACP DISCUSS/DSCN MKR DOCD: CPT | Performed by: SURGERY

## 2024-09-09 NOTE — PATIENT INSTRUCTIONS
You will be scheduled for left inguinal hernia repair; push the bulge back in every day and go to the ER for worsening pain

## 2024-09-09 NOTE — PROGRESS NOTES
Subjective   Patient ID: Thomas Lay is a 69 y.o. male who presents for No chief complaint on file..  HPI  69-year-old male patient history of A-fib on Eliquis, nonischemic cardiomyopathy with symptomatic left inguinal hernia.  TTE on December 2021 showed ejection fraction of 55-60, moderate concentric left ventricular hypertrophy and mild aortic valve regurgitation.  He noticed the left groin bulge 3 months ago. Per him, the bulge has increased in size. He reports dull left groin pain with walking and lifting. He is able to reduce the bulge.  No previous history of inguinal hernia repair.  Denies tobacco use.  Admits to weight lifting.  Denies chest pain or shortness of breath.    Objective   Physical Exam  Constitutional: no acute distress, well appearing and well nourished  Eyes: conjunctiva and lids with no erythema, swelling or discharge; EOMI  Ears, Nose, Mouth and Throat: external inspection of ears and nose are normal; Pulmonary: normal respiratory effort; clear to auscultation bilaterally, no wheezes or bronchi   Cardiovascular: regular rate and rhythm, no murmurs or extra-heart sounds; pedal pulses are normal; no extremities edema or varicosities, no peripheral edema  Abdomen: soft, non-tender, non-distended  Groins: large, slightly tender and reducible left inguinal hernia; no hernia on right side   Musculoskeletal: digits and nails normal without clubbing or cyanosis; Joints, bones and muscles are normal with normal range of motion; muscle strength/tone is normal  Skin: normal without rashes or lesion  Neurologic: cranial nerve II-XII intact grossly; normal gait  Psychiatric: oriented to person, place and time    Assessment/Plan   69-year-old male patient with symptomatic, nonobstructing large left inguinal hernia.  I recommended laparoscopic repair with mesh, possible open, possible bilateral. I explained to him and his ex-wife the procedure, the risks and complications which include but not  limited to bleeding, infection, nerve injury causing chronic pain, testicular artery injury causing testicular ischemia, injury to surrounding structures, blood clot and MI.  All questions answered and willing to proceed.  He is going be off his Eliquis for about 3 days after being evaluated by his cardiologist; I instructed him to reduce the hernia daily and go to the ER for excruciating pain         Jose Benjamin MD 09/09/24 8:52 AM

## 2024-10-25 ENCOUNTER — TELEPHONE (OUTPATIENT)
Dept: CARDIOLOGY | Facility: CLINIC | Age: 69
End: 2024-10-25
Payer: MEDICARE

## 2024-10-25 NOTE — TELEPHONE ENCOUNTER
POS request from:  Dr. Benjamin    Surgery date:  10/29/24    Type of surgery: Inguinal hernia repair    Facility:      Phone:  [phone]    Fax:  [phone]      Per Dr. Scotty Dupree , Patient is an acceptable cardiac risk for upcoming surgery.  Form completed and faxed to    [phone]    Fax confirmation received

## 2024-10-28 ENCOUNTER — ANESTHESIA EVENT (OUTPATIENT)
Dept: OPERATING ROOM | Facility: HOSPITAL | Age: 69
End: 2024-10-28
Payer: MEDICARE

## 2024-10-28 SDOH — HEALTH STABILITY: MENTAL HEALTH: CURRENT SMOKER: 0

## 2024-10-29 ENCOUNTER — ANESTHESIA (OUTPATIENT)
Dept: OPERATING ROOM | Facility: HOSPITAL | Age: 69
End: 2024-10-29
Payer: MEDICARE

## 2024-10-29 ENCOUNTER — HOSPITAL ENCOUNTER (OUTPATIENT)
Facility: HOSPITAL | Age: 69
Setting detail: OUTPATIENT SURGERY
Discharge: HOME | End: 2024-10-29
Attending: SURGERY | Admitting: SURGERY
Payer: MEDICARE

## 2024-10-29 VITALS
HEART RATE: 65 BPM | TEMPERATURE: 97.5 F | HEIGHT: 70 IN | SYSTOLIC BLOOD PRESSURE: 141 MMHG | OXYGEN SATURATION: 95 % | BODY MASS INDEX: 27.9 KG/M2 | DIASTOLIC BLOOD PRESSURE: 81 MMHG | WEIGHT: 194.89 LBS | RESPIRATION RATE: 19 BRPM

## 2024-10-29 DIAGNOSIS — K40.90 NON-RECURRENT UNILATERAL INGUINAL HERNIA WITHOUT OBSTRUCTION OR GANGRENE: Primary | ICD-10-CM

## 2024-10-29 PROCEDURE — 7100000009 HC PHASE TWO TIME - INITIAL BASE CHARGE: Performed by: SURGERY

## 2024-10-29 PROCEDURE — 3600000004 HC OR TIME - INITIAL BASE CHARGE - PROCEDURE LEVEL FOUR: Performed by: SURGERY

## 2024-10-29 PROCEDURE — 2720000007 HC OR 272 NO HCPCS: Performed by: SURGERY

## 2024-10-29 PROCEDURE — 2500000004 HC RX 250 GENERAL PHARMACY W/ HCPCS (ALT 636 FOR OP/ED): Performed by: STUDENT IN AN ORGANIZED HEALTH CARE EDUCATION/TRAINING PROGRAM

## 2024-10-29 PROCEDURE — 49650 LAP ING HERNIA REPAIR INIT: CPT | Performed by: SURGERY

## 2024-10-29 PROCEDURE — 2500000001 HC RX 250 WO HCPCS SELF ADMINISTERED DRUGS (ALT 637 FOR MEDICARE OP): Performed by: STUDENT IN AN ORGANIZED HEALTH CARE EDUCATION/TRAINING PROGRAM

## 2024-10-29 PROCEDURE — 2500000004 HC RX 250 GENERAL PHARMACY W/ HCPCS (ALT 636 FOR OP/ED)

## 2024-10-29 PROCEDURE — 7100000010 HC PHASE TWO TIME - EACH INCREMENTAL 1 MINUTE: Performed by: SURGERY

## 2024-10-29 PROCEDURE — 2500000004 HC RX 250 GENERAL PHARMACY W/ HCPCS (ALT 636 FOR OP/ED): Performed by: SURGERY

## 2024-10-29 PROCEDURE — 3700000002 HC GENERAL ANESTHESIA TIME - EACH INCREMENTAL 1 MINUTE: Performed by: SURGERY

## 2024-10-29 PROCEDURE — 7100000001 HC RECOVERY ROOM TIME - INITIAL BASE CHARGE: Performed by: SURGERY

## 2024-10-29 PROCEDURE — 7100000002 HC RECOVERY ROOM TIME - EACH INCREMENTAL 1 MINUTE: Performed by: SURGERY

## 2024-10-29 PROCEDURE — 2500000005 HC RX 250 GENERAL PHARMACY W/O HCPCS: Performed by: SURGERY

## 2024-10-29 PROCEDURE — 3600000009 HC OR TIME - EACH INCREMENTAL 1 MINUTE - PROCEDURE LEVEL FOUR: Performed by: SURGERY

## 2024-10-29 PROCEDURE — 3700000001 HC GENERAL ANESTHESIA TIME - INITIAL BASE CHARGE: Performed by: SURGERY

## 2024-10-29 PROCEDURE — 96372 THER/PROPH/DIAG INJ SC/IM: CPT | Performed by: SURGERY

## 2024-10-29 PROCEDURE — 2780000003 HC OR 278 NO HCPCS: Performed by: SURGERY

## 2024-10-29 PROCEDURE — C1781 MESH (IMPLANTABLE): HCPCS | Performed by: SURGERY

## 2024-10-29 DEVICE — LAPAROSCOPIC SELF-FIXATING MESH POLYESTER WITH POLYLACTIC ACID GRIPS AND COLLAGEN FILM
Type: IMPLANTABLE DEVICE | Site: INGUINAL | Status: FUNCTIONAL
Brand: PROGRIP

## 2024-10-29 RX ORDER — MIDAZOLAM HYDROCHLORIDE 1 MG/ML
INJECTION, SOLUTION INTRAMUSCULAR; INTRAVENOUS AS NEEDED
Status: DISCONTINUED | OUTPATIENT
Start: 2024-10-29 | End: 2024-10-29

## 2024-10-29 RX ORDER — HEPARIN SODIUM 5000 [USP'U]/ML
5000 INJECTION, SOLUTION INTRAVENOUS; SUBCUTANEOUS ONCE
Status: COMPLETED | OUTPATIENT
Start: 2024-10-29 | End: 2024-10-29

## 2024-10-29 RX ORDER — ROCURONIUM BROMIDE 10 MG/ML
INJECTION, SOLUTION INTRAVENOUS AS NEEDED
Status: DISCONTINUED | OUTPATIENT
Start: 2024-10-29 | End: 2024-10-29

## 2024-10-29 RX ORDER — LABETALOL HYDROCHLORIDE 5 MG/ML
5 INJECTION, SOLUTION INTRAVENOUS ONCE AS NEEDED
Status: DISCONTINUED | OUTPATIENT
Start: 2024-10-29 | End: 2024-10-29 | Stop reason: HOSPADM

## 2024-10-29 RX ORDER — CEFAZOLIN 1 G/1
INJECTION, POWDER, FOR SOLUTION INTRAVENOUS AS NEEDED
Status: DISCONTINUED | OUTPATIENT
Start: 2024-10-29 | End: 2024-10-29

## 2024-10-29 RX ORDER — DOCUSATE SODIUM 100 MG/1
100 CAPSULE, LIQUID FILLED ORAL 2 TIMES DAILY
Qty: 20 CAPSULE | Refills: 1 | Status: SHIPPED | OUTPATIENT
Start: 2024-10-29

## 2024-10-29 RX ORDER — FAMOTIDINE 10 MG/ML
INJECTION INTRAVENOUS AS NEEDED
Status: DISCONTINUED | OUTPATIENT
Start: 2024-10-29 | End: 2024-10-29

## 2024-10-29 RX ORDER — GLYCOPYRROLATE 0.2 MG/ML
INJECTION INTRAMUSCULAR; INTRAVENOUS AS NEEDED
Status: DISCONTINUED | OUTPATIENT
Start: 2024-10-29 | End: 2024-10-29

## 2024-10-29 RX ORDER — ONDANSETRON HYDROCHLORIDE 2 MG/ML
INJECTION, SOLUTION INTRAVENOUS AS NEEDED
Status: DISCONTINUED | OUTPATIENT
Start: 2024-10-29 | End: 2024-10-29

## 2024-10-29 RX ORDER — BUPIVACAINE HCL/EPINEPHRINE 0.25-.0005
VIAL (ML) INJECTION AS NEEDED
Status: DISCONTINUED | OUTPATIENT
Start: 2024-10-29 | End: 2024-10-29 | Stop reason: HOSPADM

## 2024-10-29 RX ORDER — CEFAZOLIN SODIUM 2 G/100ML
2 INJECTION, SOLUTION INTRAVENOUS ONCE
Status: DISCONTINUED | OUTPATIENT
Start: 2024-10-29 | End: 2024-10-29 | Stop reason: HOSPADM

## 2024-10-29 RX ORDER — HYDROCODONE BITARTRATE AND ACETAMINOPHEN 5; 325 MG/1; MG/1
1 TABLET ORAL EVERY 6 HOURS PRN
Qty: 20 TABLET | Refills: 0 | Status: SHIPPED | OUTPATIENT
Start: 2024-10-29

## 2024-10-29 RX ORDER — LIDOCAINE HYDROCHLORIDE 20 MG/ML
INJECTION, SOLUTION INFILTRATION; PERINEURAL AS NEEDED
Status: DISCONTINUED | OUTPATIENT
Start: 2024-10-29 | End: 2024-10-29

## 2024-10-29 RX ORDER — PHENYLEPHRINE HCL IN 0.9% NACL 1 MG/10 ML
SYRINGE (ML) INTRAVENOUS AS NEEDED
Status: DISCONTINUED | OUTPATIENT
Start: 2024-10-29 | End: 2024-10-29

## 2024-10-29 RX ORDER — FENTANYL CITRATE 50 UG/ML
25 INJECTION, SOLUTION INTRAMUSCULAR; INTRAVENOUS EVERY 5 MIN PRN
Status: DISCONTINUED | OUTPATIENT
Start: 2024-10-29 | End: 2024-10-29 | Stop reason: HOSPADM

## 2024-10-29 RX ORDER — OXYCODONE HYDROCHLORIDE 5 MG/1
5 TABLET ORAL ONCE
Status: COMPLETED | OUTPATIENT
Start: 2024-10-29 | End: 2024-10-29

## 2024-10-29 RX ORDER — PROPOFOL 10 MG/ML
INJECTION, EMULSION INTRAVENOUS AS NEEDED
Status: DISCONTINUED | OUTPATIENT
Start: 2024-10-29 | End: 2024-10-29

## 2024-10-29 RX ORDER — ONDANSETRON HYDROCHLORIDE 2 MG/ML
4 INJECTION, SOLUTION INTRAVENOUS ONCE AS NEEDED
Status: DISCONTINUED | OUTPATIENT
Start: 2024-10-29 | End: 2024-10-29 | Stop reason: HOSPADM

## 2024-10-29 RX ORDER — FENTANYL CITRATE 50 UG/ML
50 INJECTION, SOLUTION INTRAMUSCULAR; INTRAVENOUS EVERY 5 MIN PRN
Status: DISCONTINUED | OUTPATIENT
Start: 2024-10-29 | End: 2024-10-29 | Stop reason: HOSPADM

## 2024-10-29 RX ORDER — FENTANYL CITRATE 50 UG/ML
INJECTION, SOLUTION INTRAMUSCULAR; INTRAVENOUS AS NEEDED
Status: DISCONTINUED | OUTPATIENT
Start: 2024-10-29 | End: 2024-10-29

## 2024-10-29 RX ORDER — SODIUM CHLORIDE 0.9 G/100ML
IRRIGANT IRRIGATION AS NEEDED
Status: DISCONTINUED | OUTPATIENT
Start: 2024-10-29 | End: 2024-10-29 | Stop reason: HOSPADM

## 2024-10-29 ASSESSMENT — PAIN - FUNCTIONAL ASSESSMENT
PAIN_FUNCTIONAL_ASSESSMENT: 0-10

## 2024-10-29 ASSESSMENT — COLUMBIA-SUICIDE SEVERITY RATING SCALE - C-SSRS
1. IN THE PAST MONTH, HAVE YOU WISHED YOU WERE DEAD OR WISHED YOU COULD GO TO SLEEP AND NOT WAKE UP?: NO
2. HAVE YOU ACTUALLY HAD ANY THOUGHTS OF KILLING YOURSELF?: NO
6. HAVE YOU EVER DONE ANYTHING, STARTED TO DO ANYTHING, OR PREPARED TO DO ANYTHING TO END YOUR LIFE?: NO

## 2024-10-29 ASSESSMENT — PAIN SCALES - GENERAL
PAINLEVEL_OUTOF10: 0 - NO PAIN
PAINLEVEL_OUTOF10: 2
PAINLEVEL_OUTOF10: 4
PAINLEVEL_OUTOF10: 0 - NO PAIN
PAINLEVEL_OUTOF10: 2
PAINLEVEL_OUTOF10: 0 - NO PAIN
PAINLEVEL_OUTOF10: 4
PAINLEVEL_OUTOF10: 0 - NO PAIN
PAINLEVEL_OUTOF10: 0 - NO PAIN

## 2024-10-29 ASSESSMENT — PAIN DESCRIPTION - DESCRIPTORS: DESCRIPTORS: SORE

## 2024-11-06 ENCOUNTER — APPOINTMENT (OUTPATIENT)
Dept: CARDIOLOGY | Facility: CLINIC | Age: 69
End: 2024-11-06
Payer: MEDICARE

## 2024-11-18 ENCOUNTER — APPOINTMENT (OUTPATIENT)
Dept: CARDIOLOGY | Facility: HOSPITAL | Age: 69
End: 2024-11-18
Payer: MEDICARE

## 2024-11-18 ENCOUNTER — APPOINTMENT (OUTPATIENT)
Dept: RADIOLOGY | Facility: HOSPITAL | Age: 69
End: 2024-11-18
Payer: MEDICARE

## 2024-11-18 ENCOUNTER — HOSPITAL ENCOUNTER (INPATIENT)
Facility: HOSPITAL | Age: 69
LOS: 1 days | Discharge: HOME HEALTH | End: 2024-11-20
Attending: EMERGENCY MEDICINE | Admitting: SURGERY
Payer: MEDICARE

## 2024-11-18 ENCOUNTER — APPOINTMENT (OUTPATIENT)
Dept: SURGERY | Facility: CLINIC | Age: 69
End: 2024-11-18
Payer: MEDICARE

## 2024-11-18 ENCOUNTER — LAB (OUTPATIENT)
Dept: LAB | Facility: LAB | Age: 69
End: 2024-11-18
Payer: MEDICARE

## 2024-11-18 VITALS — HEART RATE: 67 BPM | SYSTOLIC BLOOD PRESSURE: 167 MMHG | DIASTOLIC BLOOD PRESSURE: 88 MMHG | OXYGEN SATURATION: 98 %

## 2024-11-18 DIAGNOSIS — I48.0 PAROXYSMAL ATRIAL FIBRILLATION (MULTI): ICD-10-CM

## 2024-11-18 DIAGNOSIS — Z98.890 S/P INGUINAL HERNIA REPAIR USING SYNTHETIC PATCH: ICD-10-CM

## 2024-11-18 DIAGNOSIS — R10.9 ABDOMINAL PAIN, UNSPECIFIED ABDOMINAL LOCATION: Primary | ICD-10-CM

## 2024-11-18 DIAGNOSIS — Z79.01 CHRONIC ANTICOAGULATION: ICD-10-CM

## 2024-11-18 DIAGNOSIS — Z87.19 S/P INGUINAL HERNIA REPAIR USING SYNTHETIC PATCH: ICD-10-CM

## 2024-11-18 DIAGNOSIS — I42.9 CARDIOMYOPATHY, UNSPECIFIED TYPE (MULTI): ICD-10-CM

## 2024-11-18 DIAGNOSIS — T14.8XXA HEMATOMA: ICD-10-CM

## 2024-11-18 DIAGNOSIS — N17.9 AKI (ACUTE KIDNEY INJURY) (CMS-HCC): ICD-10-CM

## 2024-11-18 DIAGNOSIS — Z98.890 S/P INGUINAL HERNIA REPAIR USING SYNTHETIC PATCH: Primary | ICD-10-CM

## 2024-11-18 DIAGNOSIS — R33.9 URINARY RETENTION: ICD-10-CM

## 2024-11-18 DIAGNOSIS — R97.20 ELEVATED PSA: ICD-10-CM

## 2024-11-18 DIAGNOSIS — Z87.19 S/P INGUINAL HERNIA REPAIR USING SYNTHETIC PATCH: Primary | ICD-10-CM

## 2024-11-18 LAB
ABO GROUP (TYPE) IN BLOOD: NORMAL
ALBUMIN SERPL BCP-MCNC: 4 G/DL (ref 3.4–5)
ALP SERPL-CCNC: 77 U/L (ref 33–136)
ALT SERPL W P-5'-P-CCNC: 22 U/L (ref 10–52)
ANION GAP SERPL CALC-SCNC: 12 MMOL/L (ref 10–20)
ANION GAP SERPL CALC-SCNC: 13 MMOL/L (ref 10–20)
ANTIBODY SCREEN: NORMAL
APPEARANCE UR: CLEAR
APTT PPP: 38 SECONDS (ref 27–38)
AST SERPL W P-5'-P-CCNC: 19 U/L (ref 9–39)
BASOPHILS # BLD AUTO: 0.07 X10*3/UL (ref 0–0.1)
BASOPHILS NFR BLD AUTO: 0.8 %
BILIRUB SERPL-MCNC: 0.6 MG/DL (ref 0–1.2)
BILIRUB UR STRIP.AUTO-MCNC: NEGATIVE MG/DL
BUN SERPL-MCNC: 36 MG/DL (ref 6–23)
BUN SERPL-MCNC: 38 MG/DL (ref 6–23)
CALCIUM SERPL-MCNC: 9.2 MG/DL (ref 8.6–10.3)
CALCIUM SERPL-MCNC: 9.3 MG/DL (ref 8.6–10.3)
CARDIAC TROPONIN I PNL SERPL HS: 6 NG/L (ref 0–20)
CARDIAC TROPONIN I PNL SERPL HS: 6 NG/L (ref 0–20)
CHLORIDE SERPL-SCNC: 107 MMOL/L (ref 98–107)
CHLORIDE SERPL-SCNC: 110 MMOL/L (ref 98–107)
CO2 SERPL-SCNC: 24 MMOL/L (ref 21–32)
CO2 SERPL-SCNC: 26 MMOL/L (ref 21–32)
COLOR UR: ABNORMAL
CREAT SERPL-MCNC: 2.85 MG/DL (ref 0.5–1.3)
CREAT SERPL-MCNC: 3 MG/DL (ref 0.5–1.3)
EGFRCR SERPLBLD CKD-EPI 2021: 22 ML/MIN/1.73M*2
EGFRCR SERPLBLD CKD-EPI 2021: 23 ML/MIN/1.73M*2
EOSINOPHIL # BLD AUTO: 0.22 X10*3/UL (ref 0–0.7)
EOSINOPHIL NFR BLD AUTO: 2.6 %
ERYTHROCYTE [DISTWIDTH] IN BLOOD BY AUTOMATED COUNT: 12.4 % (ref 11.5–14.5)
ERYTHROCYTE [DISTWIDTH] IN BLOOD BY AUTOMATED COUNT: 12.5 % (ref 11.5–14.5)
GLUCOSE SERPL-MCNC: 108 MG/DL (ref 74–99)
GLUCOSE SERPL-MCNC: 89 MG/DL (ref 74–99)
GLUCOSE UR STRIP.AUTO-MCNC: NORMAL MG/DL
HCT VFR BLD AUTO: 37.6 % (ref 41–52)
HCT VFR BLD AUTO: 38.7 % (ref 41–52)
HGB BLD-MCNC: 12.2 G/DL (ref 13.5–17.5)
HGB BLD-MCNC: 12.5 G/DL (ref 13.5–17.5)
IMM GRANULOCYTES # BLD AUTO: 0.03 X10*3/UL (ref 0–0.7)
IMM GRANULOCYTES NFR BLD AUTO: 0.4 % (ref 0–0.9)
INR PPP: 1.3 (ref 0.9–1.1)
KETONES UR STRIP.AUTO-MCNC: NEGATIVE MG/DL
LACTATE SERPL-SCNC: 0.8 MMOL/L (ref 0.4–2)
LEUKOCYTE ESTERASE UR QL STRIP.AUTO: ABNORMAL
LYMPHOCYTES # BLD AUTO: 1.44 X10*3/UL (ref 1.2–4.8)
LYMPHOCYTES NFR BLD AUTO: 16.8 %
MCH RBC QN AUTO: 29.3 PG (ref 26–34)
MCH RBC QN AUTO: 29.5 PG (ref 26–34)
MCHC RBC AUTO-ENTMCNC: 32.3 G/DL (ref 32–36)
MCHC RBC AUTO-ENTMCNC: 32.4 G/DL (ref 32–36)
MCV RBC AUTO: 91 FL (ref 80–100)
MCV RBC AUTO: 91 FL (ref 80–100)
MONOCYTES # BLD AUTO: 0.91 X10*3/UL (ref 0.1–1)
MONOCYTES NFR BLD AUTO: 10.6 %
NEUTROPHILS # BLD AUTO: 5.88 X10*3/UL (ref 1.2–7.7)
NEUTROPHILS NFR BLD AUTO: 68.8 %
NITRITE UR QL STRIP.AUTO: NEGATIVE
NRBC BLD-RTO: 0 /100 WBCS (ref 0–0)
NRBC BLD-RTO: 0 /100 WBCS (ref 0–0)
PH UR STRIP.AUTO: 5 [PH]
PLATELET # BLD AUTO: 490 X10*3/UL (ref 150–450)
PLATELET # BLD AUTO: 496 X10*3/UL (ref 150–450)
POTASSIUM SERPL-SCNC: 4.1 MMOL/L (ref 3.5–5.3)
POTASSIUM SERPL-SCNC: 5.3 MMOL/L (ref 3.5–5.3)
PROT SERPL-MCNC: 7.3 G/DL (ref 6.4–8.2)
PROT UR STRIP.AUTO-MCNC: NEGATIVE MG/DL
PROTHROMBIN TIME: 15.2 SECONDS (ref 9.8–12.8)
RBC # BLD AUTO: 4.14 X10*6/UL (ref 4.5–5.9)
RBC # BLD AUTO: 4.26 X10*6/UL (ref 4.5–5.9)
RBC # UR STRIP.AUTO: ABNORMAL /UL
RBC #/AREA URNS AUTO: ABNORMAL /HPF
RH FACTOR (ANTIGEN D): NORMAL
SODIUM SERPL-SCNC: 140 MMOL/L (ref 136–145)
SODIUM SERPL-SCNC: 143 MMOL/L (ref 136–145)
SP GR UR STRIP.AUTO: 1.01
UROBILINOGEN UR STRIP.AUTO-MCNC: NORMAL MG/DL
WBC # BLD AUTO: 8.6 X10*3/UL (ref 4.4–11.3)
WBC # BLD AUTO: 8.8 X10*3/UL (ref 4.4–11.3)
WBC #/AREA URNS AUTO: ABNORMAL /HPF

## 2024-11-18 PROCEDURE — 1159F MED LIST DOCD IN RCRD: CPT | Performed by: SURGERY

## 2024-11-18 PROCEDURE — 96360 HYDRATION IV INFUSION INIT: CPT | Mod: 59

## 2024-11-18 PROCEDURE — 74176 CT ABD & PELVIS W/O CONTRAST: CPT

## 2024-11-18 PROCEDURE — 85027 COMPLETE CBC AUTOMATED: CPT

## 2024-11-18 PROCEDURE — 1160F RVW MEDS BY RX/DR IN RCRD: CPT | Performed by: SURGERY

## 2024-11-18 PROCEDURE — 86901 BLOOD TYPING SEROLOGIC RH(D): CPT

## 2024-11-18 PROCEDURE — 84153 ASSAY OF PSA TOTAL: CPT

## 2024-11-18 PROCEDURE — 96361 HYDRATE IV INFUSION ADD-ON: CPT | Mod: 59

## 2024-11-18 PROCEDURE — 85610 PROTHROMBIN TIME: CPT

## 2024-11-18 PROCEDURE — 99285 EMERGENCY DEPT VISIT HI MDM: CPT | Mod: 25

## 2024-11-18 PROCEDURE — 93005 ELECTROCARDIOGRAM TRACING: CPT

## 2024-11-18 PROCEDURE — 3079F DIAST BP 80-89 MM HG: CPT | Performed by: SURGERY

## 2024-11-18 PROCEDURE — 2500000005 HC RX 250 GENERAL PHARMACY W/O HCPCS

## 2024-11-18 PROCEDURE — 99024 POSTOP FOLLOW-UP VISIT: CPT | Performed by: SURGERY

## 2024-11-18 PROCEDURE — 2500000004 HC RX 250 GENERAL PHARMACY W/ HCPCS (ALT 636 FOR OP/ED)

## 2024-11-18 PROCEDURE — 81001 URINALYSIS AUTO W/SCOPE: CPT

## 2024-11-18 PROCEDURE — 3077F SYST BP >= 140 MM HG: CPT | Performed by: SURGERY

## 2024-11-18 PROCEDURE — 87086 URINE CULTURE/COLONY COUNT: CPT | Mod: ELYLAB

## 2024-11-18 PROCEDURE — G0378 HOSPITAL OBSERVATION PER HR: HCPCS

## 2024-11-18 PROCEDURE — 85025 COMPLETE CBC W/AUTO DIFF WBC: CPT

## 2024-11-18 PROCEDURE — 36415 COLL VENOUS BLD VENIPUNCTURE: CPT

## 2024-11-18 PROCEDURE — 84484 ASSAY OF TROPONIN QUANT: CPT

## 2024-11-18 PROCEDURE — 80053 COMPREHEN METABOLIC PANEL: CPT

## 2024-11-18 PROCEDURE — 1036F TOBACCO NON-USER: CPT | Performed by: SURGERY

## 2024-11-18 PROCEDURE — 74176 CT ABD & PELVIS W/O CONTRAST: CPT | Performed by: STUDENT IN AN ORGANIZED HEALTH CARE EDUCATION/TRAINING PROGRAM

## 2024-11-18 PROCEDURE — 1123F ACP DISCUSS/DSCN MKR DOCD: CPT | Performed by: SURGERY

## 2024-11-18 PROCEDURE — 80048 BASIC METABOLIC PNL TOTAL CA: CPT

## 2024-11-18 PROCEDURE — 84154 ASSAY OF PSA FREE: CPT

## 2024-11-18 PROCEDURE — 83605 ASSAY OF LACTIC ACID: CPT

## 2024-11-18 PROCEDURE — 51702 INSERT TEMP BLADDER CATH: CPT

## 2024-11-18 RX ORDER — LIDOCAINE HYDROCHLORIDE 20 MG/ML
1 JELLY TOPICAL ONCE
Status: COMPLETED | OUTPATIENT
Start: 2024-11-18 | End: 2024-11-18

## 2024-11-18 ASSESSMENT — COLUMBIA-SUICIDE SEVERITY RATING SCALE - C-SSRS
2. HAVE YOU ACTUALLY HAD ANY THOUGHTS OF KILLING YOURSELF?: NO
6. HAVE YOU EVER DONE ANYTHING, STARTED TO DO ANYTHING, OR PREPARED TO DO ANYTHING TO END YOUR LIFE?: NO
1. IN THE PAST MONTH, HAVE YOU WISHED YOU WERE DEAD OR WISHED YOU COULD GO TO SLEEP AND NOT WAKE UP?: NO

## 2024-11-18 ASSESSMENT — ENCOUNTER SYMPTOMS
ABDOMINAL DISTENTION: 1
ABDOMINAL PAIN: 1

## 2024-11-18 ASSESSMENT — PAIN - FUNCTIONAL ASSESSMENT: PAIN_FUNCTIONAL_ASSESSMENT: 0-10

## 2024-11-18 NOTE — PROGRESS NOTES
"Subjective   Patient ID: Thomas Lay is a 69 y.o. male who presents for Post-op.  HPI  63-year-old male patient who on October 29 underwent laparoscopic repair of large, indirect left inguinal hernia with mesh.  He was off the Eliquis 3 days before surgery and per ex-wife, he restarted the Eliquis 5 days post operatively. He reports feeling weak and \"belly looking big\". Denies pain. He is urinating multiple times daily. Denies chest pain, chest pressure or shortness of breath.      Objective   Physical Exam  Gen: no acute distress  CV: RRR  Pulm: unlabored  Abd: soft, large infra-umbilical mass consistent with a hematoma; no recurrent hernia  Assessment/Plan   69-year-old male patient who is on Eliquis for Afib, ASA who underwent laparoscopic repair of a large left inguinal hernia with mesh on 10/29. The large supra-pubic mass is concerning for post operative hematoma. Patient will need CT angio to rule out active bleed but unable to get contrast due to  MARILIA. I ordered CBC and BMP which I discussed with patient. On the BMP, patient with MARILIA (Cre at 3: baseline: 1.01 to 1.15). Hgb/HCT: 12.5/38.7 (baseline 13.5 to 15.3/41.9 to 46.5). I have instructed him to go to the ER and have spoken to the ER. He will need to be admitted, aggressive fluid resuscitation and CT without contrast to assess the hematoma. All questions answered.     Jose Benjamin MD 11/18/24 1:55 PM   " Person calling (if not the patient, is medical info able to be given?): patient    Callback phone number: [phone]    Permission to leave detailed message:yes    Detailed reason for call: patient has questions about his bowel prep

## 2024-11-18 NOTE — PATIENT INSTRUCTIONS
You do have a hematoma; I will get CBC, BMP and CT scan; don't take Eliquis until you hear from me

## 2024-11-19 ENCOUNTER — APPOINTMENT (OUTPATIENT)
Dept: RADIOLOGY | Facility: HOSPITAL | Age: 69
End: 2024-11-19
Payer: MEDICARE

## 2024-11-19 ENCOUNTER — PATIENT MESSAGE (OUTPATIENT)
Dept: CARDIOLOGY | Facility: CLINIC | Age: 69
End: 2024-11-19
Payer: MEDICARE

## 2024-11-19 ENCOUNTER — APPOINTMENT (OUTPATIENT)
Dept: CARDIOLOGY | Facility: HOSPITAL | Age: 69
End: 2024-11-19
Payer: MEDICARE

## 2024-11-19 PROBLEM — R10.9 ABDOMINAL PAIN, UNSPECIFIED ABDOMINAL LOCATION: Status: ACTIVE | Noted: 2024-11-19

## 2024-11-19 LAB
ANION GAP SERPL CALC-SCNC: 11 MMOL/L (ref 10–20)
ATRIAL RATE: 62 BPM
BUN SERPL-MCNC: 30 MG/DL (ref 6–23)
CA-I BLD-SCNC: 1.18 MMOL/L (ref 1.1–1.33)
CALCIUM SERPL-MCNC: 8.7 MG/DL (ref 8.6–10.3)
CHLORIDE SERPL-SCNC: 113 MMOL/L (ref 98–107)
CO2 SERPL-SCNC: 25 MMOL/L (ref 21–32)
CREAT SERPL-MCNC: 2.24 MG/DL (ref 0.5–1.3)
EGFRCR SERPLBLD CKD-EPI 2021: 31 ML/MIN/1.73M*2
ERYTHROCYTE [DISTWIDTH] IN BLOOD BY AUTOMATED COUNT: 12.4 % (ref 11.5–14.5)
GLUCOSE SERPL-MCNC: 92 MG/DL (ref 74–99)
HCT VFR BLD AUTO: 33.1 % (ref 41–52)
HGB BLD-MCNC: 10.8 G/DL (ref 13.5–17.5)
HOLD SPECIMEN: NORMAL
HOLD SPECIMEN: NORMAL
MCH RBC QN AUTO: 29.5 PG (ref 26–34)
MCHC RBC AUTO-ENTMCNC: 32.6 G/DL (ref 32–36)
MCV RBC AUTO: 90 FL (ref 80–100)
NRBC BLD-RTO: 0 /100 WBCS (ref 0–0)
P AXIS: 50 DEGREES
P OFFSET: 218 MS
P ONSET: 148 MS
PHOSPHATE SERPL-MCNC: 4.3 MG/DL (ref 2.5–4.9)
PLATELET # BLD AUTO: 368 X10*3/UL (ref 150–450)
POTASSIUM SERPL-SCNC: 4.6 MMOL/L (ref 3.5–5.3)
PR INTERVAL: 156 MS
Q ONSET: 226 MS
QRS COUNT: 10 BEATS
QRS DURATION: 88 MS
QT INTERVAL: 492 MS
QTC CALCULATION(BAZETT): 499 MS
QTC FREDERICIA: 497 MS
R AXIS: 14 DEGREES
RBC # BLD AUTO: 3.66 X10*6/UL (ref 4.5–5.9)
SODIUM SERPL-SCNC: 144 MMOL/L (ref 136–145)
T AXIS: -73 DEGREES
T OFFSET: 472 MS
VENTRICULAR RATE: 62 BPM
WBC # BLD AUTO: 6.3 X10*3/UL (ref 4.4–11.3)

## 2024-11-19 PROCEDURE — 2500000001 HC RX 250 WO HCPCS SELF ADMINISTERED DRUGS (ALT 637 FOR MEDICARE OP)

## 2024-11-19 PROCEDURE — 99222 1ST HOSP IP/OBS MODERATE 55: CPT | Performed by: INTERNAL MEDICINE

## 2024-11-19 PROCEDURE — 85027 COMPLETE CBC AUTOMATED: CPT

## 2024-11-19 PROCEDURE — 2500000004 HC RX 250 GENERAL PHARMACY W/ HCPCS (ALT 636 FOR OP/ED)

## 2024-11-19 PROCEDURE — 2500000001 HC RX 250 WO HCPCS SELF ADMINISTERED DRUGS (ALT 637 FOR MEDICARE OP): Performed by: NURSE PRACTITIONER

## 2024-11-19 PROCEDURE — 76872 US TRANSRECTAL: CPT

## 2024-11-19 PROCEDURE — 84100 ASSAY OF PHOSPHORUS: CPT

## 2024-11-19 PROCEDURE — 93005 ELECTROCARDIOGRAM TRACING: CPT

## 2024-11-19 PROCEDURE — 2500000005 HC RX 250 GENERAL PHARMACY W/O HCPCS: Performed by: UROLOGY

## 2024-11-19 PROCEDURE — 2500000002 HC RX 250 W HCPCS SELF ADMINISTERED DRUGS (ALT 637 FOR MEDICARE OP, ALT 636 FOR OP/ED)

## 2024-11-19 PROCEDURE — 93010 ELECTROCARDIOGRAM REPORT: CPT | Performed by: INTERNAL MEDICINE

## 2024-11-19 PROCEDURE — 2500000001 HC RX 250 WO HCPCS SELF ADMINISTERED DRUGS (ALT 637 FOR MEDICARE OP): Performed by: UROLOGY

## 2024-11-19 PROCEDURE — 76872 US TRANSRECTAL: CPT | Performed by: RADIOLOGY

## 2024-11-19 PROCEDURE — 82330 ASSAY OF CALCIUM: CPT

## 2024-11-19 PROCEDURE — 1200000002 HC GENERAL ROOM WITH TELEMETRY DAILY

## 2024-11-19 PROCEDURE — 2500000002 HC RX 250 W HCPCS SELF ADMINISTERED DRUGS (ALT 637 FOR MEDICARE OP, ALT 636 FOR OP/ED): Performed by: UROLOGY

## 2024-11-19 PROCEDURE — 80048 BASIC METABOLIC PNL TOTAL CA: CPT

## 2024-11-19 PROCEDURE — 36415 COLL VENOUS BLD VENIPUNCTURE: CPT

## 2024-11-19 RX ORDER — METOPROLOL TARTRATE 1 MG/ML
2.5 INJECTION, SOLUTION INTRAVENOUS EVERY 6 HOURS PRN
Status: DISCONTINUED | OUTPATIENT
Start: 2024-11-19 | End: 2024-11-20 | Stop reason: HOSPADM

## 2024-11-19 RX ORDER — TAMSULOSIN HYDROCHLORIDE 0.4 MG/1
0.4 CAPSULE ORAL DAILY
Status: DISCONTINUED | OUTPATIENT
Start: 2024-11-19 | End: 2024-11-19

## 2024-11-19 RX ORDER — TAMSULOSIN HYDROCHLORIDE 0.4 MG/1
0.4 CAPSULE ORAL 2 TIMES DAILY
Status: DISCONTINUED | OUTPATIENT
Start: 2024-11-19 | End: 2024-11-20 | Stop reason: HOSPADM

## 2024-11-19 RX ORDER — SOTALOL HYDROCHLORIDE 120 MG/1
180 TABLET ORAL 2 TIMES DAILY
Status: DISCONTINUED | OUTPATIENT
Start: 2024-11-19 | End: 2024-11-19

## 2024-11-19 RX ORDER — CHOLECALCIFEROL (VITAMIN D3) 25 MCG
2000 TABLET ORAL DAILY
Status: DISCONTINUED | OUTPATIENT
Start: 2024-11-20 | End: 2024-11-20 | Stop reason: HOSPADM

## 2024-11-19 RX ORDER — SOTALOL HYDROCHLORIDE 120 MG/1
180 TABLET ORAL 2 TIMES DAILY
Status: DISCONTINUED | OUTPATIENT
Start: 2024-11-19 | End: 2024-11-20 | Stop reason: HOSPADM

## 2024-11-19 RX ORDER — FINASTERIDE 5 MG/1
5 TABLET, FILM COATED ORAL DAILY
Status: DISCONTINUED | OUTPATIENT
Start: 2024-11-19 | End: 2024-11-20 | Stop reason: HOSPADM

## 2024-11-19 RX ORDER — LANOLIN ALCOHOL/MO/W.PET/CERES
200 CREAM (GRAM) TOPICAL DAILY
Status: DISCONTINUED | OUTPATIENT
Start: 2024-11-19 | End: 2024-11-20 | Stop reason: HOSPADM

## 2024-11-19 RX ORDER — AMLODIPINE BESYLATE 5 MG/1
10 TABLET ORAL DAILY
Status: DISCONTINUED | OUTPATIENT
Start: 2024-11-20 | End: 2024-11-20 | Stop reason: HOSPADM

## 2024-11-19 RX ORDER — ASPIRIN 81 MG/1
81 TABLET ORAL DAILY
Status: DISCONTINUED | OUTPATIENT
Start: 2024-11-19 | End: 2024-11-20 | Stop reason: HOSPADM

## 2024-11-19 RX ORDER — DOCUSATE SODIUM 100 MG/1
100 CAPSULE, LIQUID FILLED ORAL 2 TIMES DAILY
Status: DISCONTINUED | OUTPATIENT
Start: 2024-11-19 | End: 2024-11-20 | Stop reason: HOSPADM

## 2024-11-19 RX ORDER — LOSARTAN POTASSIUM 25 MG/1
25 TABLET ORAL DAILY
Status: DISCONTINUED | OUTPATIENT
Start: 2024-11-19 | End: 2024-11-20 | Stop reason: HOSPADM

## 2024-11-19 RX ORDER — PRAVASTATIN SODIUM 20 MG/1
20 TABLET ORAL NIGHTLY
Status: DISCONTINUED | OUTPATIENT
Start: 2024-11-19 | End: 2024-11-20 | Stop reason: HOSPADM

## 2024-11-19 RX ORDER — METOPROLOL SUCCINATE 50 MG/1
50 TABLET, EXTENDED RELEASE ORAL NIGHTLY
Status: DISCONTINUED | OUTPATIENT
Start: 2024-11-19 | End: 2024-11-19

## 2024-11-19 RX ORDER — SODIUM CHLORIDE, SODIUM LACTATE, POTASSIUM CHLORIDE, CALCIUM CHLORIDE 600; 310; 30; 20 MG/100ML; MG/100ML; MG/100ML; MG/100ML
100 INJECTION, SOLUTION INTRAVENOUS CONTINUOUS
Status: DISCONTINUED | OUTPATIENT
Start: 2024-11-19 | End: 2024-11-19

## 2024-11-19 RX ORDER — FINASTERIDE 5 MG/1
5 TABLET, FILM COATED ORAL DAILY
Status: DISCONTINUED | OUTPATIENT
Start: 2024-11-19 | End: 2024-11-19 | Stop reason: SDUPTHER

## 2024-11-19 SDOH — SOCIAL STABILITY: SOCIAL INSECURITY: ARE YOU OR HAVE YOU BEEN THREATENED OR ABUSED PHYSICALLY, EMOTIONALLY, OR SEXUALLY BY ANYONE?: NO

## 2024-11-19 SDOH — SOCIAL STABILITY: SOCIAL INSECURITY
WITHIN THE LAST YEAR, HAVE YOU BEEN KICKED, HIT, SLAPPED, OR OTHERWISE PHYSICALLY HURT BY YOUR PARTNER OR EX-PARTNER?: NO

## 2024-11-19 SDOH — SOCIAL STABILITY: SOCIAL INSECURITY: WITHIN THE LAST YEAR, HAVE YOU BEEN AFRAID OF YOUR PARTNER OR EX-PARTNER?: NO

## 2024-11-19 SDOH — SOCIAL STABILITY: SOCIAL INSECURITY
WITHIN THE LAST YEAR, HAVE YOU BEEN RAPED OR FORCED TO HAVE ANY KIND OF SEXUAL ACTIVITY BY YOUR PARTNER OR EX-PARTNER?: NO

## 2024-11-19 SDOH — ECONOMIC STABILITY: FOOD INSECURITY: WITHIN THE PAST 12 MONTHS, YOU WORRIED THAT YOUR FOOD WOULD RUN OUT BEFORE YOU GOT THE MONEY TO BUY MORE.: NEVER TRUE

## 2024-11-19 SDOH — ECONOMIC STABILITY: INCOME INSECURITY: IN THE PAST 12 MONTHS HAS THE ELECTRIC, GAS, OIL, OR WATER COMPANY THREATENED TO SHUT OFF SERVICES IN YOUR HOME?: NO

## 2024-11-19 SDOH — SOCIAL STABILITY: SOCIAL INSECURITY: HAVE YOU HAD THOUGHTS OF HARMING ANYONE ELSE?: NO

## 2024-11-19 SDOH — SOCIAL STABILITY: SOCIAL INSECURITY: HAS ANYONE EVER THREATENED TO HURT YOUR FAMILY OR YOUR PETS?: NO

## 2024-11-19 SDOH — SOCIAL STABILITY: SOCIAL INSECURITY: WITHIN THE LAST YEAR, HAVE YOU BEEN HUMILIATED OR EMOTIONALLY ABUSED IN OTHER WAYS BY YOUR PARTNER OR EX-PARTNER?: NO

## 2024-11-19 SDOH — SOCIAL STABILITY: SOCIAL INSECURITY: DO YOU FEEL ANYONE HAS EXPLOITED OR TAKEN ADVANTAGE OF YOU FINANCIALLY OR OF YOUR PERSONAL PROPERTY?: NO

## 2024-11-19 SDOH — ECONOMIC STABILITY: FOOD INSECURITY: WITHIN THE PAST 12 MONTHS, THE FOOD YOU BOUGHT JUST DIDN'T LAST AND YOU DIDN'T HAVE MONEY TO GET MORE.: NEVER TRUE

## 2024-11-19 SDOH — SOCIAL STABILITY: SOCIAL INSECURITY: DO YOU FEEL UNSAFE GOING BACK TO THE PLACE WHERE YOU ARE LIVING?: NO

## 2024-11-19 SDOH — SOCIAL STABILITY: SOCIAL INSECURITY: ARE THERE ANY APPARENT SIGNS OF INJURIES/BEHAVIORS THAT COULD BE RELATED TO ABUSE/NEGLECT?: NO

## 2024-11-19 SDOH — SOCIAL STABILITY: SOCIAL INSECURITY: DOES ANYONE TRY TO KEEP YOU FROM HAVING/CONTACTING OTHER FRIENDS OR DOING THINGS OUTSIDE YOUR HOME?: NO

## 2024-11-19 SDOH — SOCIAL STABILITY: SOCIAL INSECURITY: ABUSE: ADULT

## 2024-11-19 SDOH — SOCIAL STABILITY: SOCIAL INSECURITY: WERE YOU ABLE TO COMPLETE ALL THE BEHAVIORAL HEALTH SCREENINGS?: YES

## 2024-11-19 ASSESSMENT — COGNITIVE AND FUNCTIONAL STATUS - GENERAL
MOBILITY SCORE: 24
DAILY ACTIVITIY SCORE: 24
PATIENT BASELINE BEDBOUND: NO

## 2024-11-19 ASSESSMENT — ACTIVITIES OF DAILY LIVING (ADL)
WALKS IN HOME: INDEPENDENT
HEARING - RIGHT EAR: FUNCTIONAL
TOILETING: INDEPENDENT
LACK_OF_TRANSPORTATION: NO
BATHING: INDEPENDENT
HEARING - LEFT EAR: FUNCTIONAL
ADEQUATE_TO_COMPLETE_ADL: YES
JUDGMENT_ADEQUATE_SAFELY_COMPLETE_DAILY_ACTIVITIES: YES
FEEDING YOURSELF: INDEPENDENT
PATIENT'S MEMORY ADEQUATE TO SAFELY COMPLETE DAILY ACTIVITIES?: YES
LACK_OF_TRANSPORTATION: NO
GROOMING: INDEPENDENT
DRESSING YOURSELF: INDEPENDENT

## 2024-11-19 ASSESSMENT — LIFESTYLE VARIABLES
HOW MANY STANDARD DRINKS CONTAINING ALCOHOL DO YOU HAVE ON A TYPICAL DAY: 3 OR 4
HOW OFTEN DO YOU HAVE 6 OR MORE DRINKS ON ONE OCCASION: NEVER
SKIP TO QUESTIONS 9-10: 0
HOW OFTEN DO YOU HAVE A DRINK CONTAINING ALCOHOL: 2-4 TIMES A MONTH
AUDIT-C TOTAL SCORE: 3
AUDIT-C TOTAL SCORE: 3

## 2024-11-19 ASSESSMENT — PAIN - FUNCTIONAL ASSESSMENT
PAIN_FUNCTIONAL_ASSESSMENT: 0-10
PAIN_FUNCTIONAL_ASSESSMENT: 0-10

## 2024-11-19 ASSESSMENT — PATIENT HEALTH QUESTIONNAIRE - PHQ9
1. LITTLE INTEREST OR PLEASURE IN DOING THINGS: SEVERAL DAYS
SUM OF ALL RESPONSES TO PHQ9 QUESTIONS 1 & 2: 1
2. FEELING DOWN, DEPRESSED OR HOPELESS: NOT AT ALL

## 2024-11-19 ASSESSMENT — PAIN SCALES - GENERAL
PAINLEVEL_OUTOF10: 0 - NO PAIN
PAINLEVEL_OUTOF10: 0 - NO PAIN

## 2024-11-19 NOTE — PROGRESS NOTES
11/19/24 1702   Discharge Planning   Living Arrangements Alone   Support Systems Spouse/significant other  (EX spouse is supportive)   Assistance Needed independent in adls and iadls PTA. no ad use , no falls, drove. no medication barriers.   Type of Residence Private residence   Number of Stairs to Enter Residence 3   Do you have animals or pets at home? No   Home or Post Acute Services In home services   Type of Home Care Services Home nursing visits  ([facility])   Expected Discharge Disposition Home H   Does the patient need discharge transport arranged? No   Transportation Needs   In the past 12 months, has lack of transportation kept you from medical appointments or from getting medications? no   In the past 12 months, has lack of transportation kept you from meetings, work, or from getting things needed for daily living? No   Patient Choice   Provider Choice list and CMS website (https://medicare.gov/care-compare#search) for post-acute Quality and Resource Measure Data were provided and reviewed with: Patient     Pt confirms demo' s, ins and pcp- Dr guaman. Per Dr d'amico the Plan for pt on discharge ; Home w/ indwelling [facility]o catheter . Pt is agreeable ot HHC and prefers  [facility]. CNP w/ Surgery updated on plan. Will need [facility].

## 2024-11-19 NOTE — CONSULTS
UROLOGY CONSULT NOTE FOR TUESDAY, 11/19/2024    SUBJECTIVE: Pleasant 69-year-old white male currently being seen in referral for urinary retention requiring Luo catheter placement.  On a clinical basis the patient has had longstanding relatively mild BPH related symptomatology which he verbally states has become more severe since his hernia surgery about 3 weeks ago with anesthesia.  Reports he has had difficulty initiating his urinary stream with lower abdominal distention and bloating and pressure-like discomfort.  Urine otherwise was clear no dysuria no signs of infection no hematuria  Prior history of BPH over several years, reportedly had a prostate ultrasound in the year 2021 showing a volume of 77 cc with PSA of 4.9, more recent PSA from 7/30/2024 was 3.4  Remainder of the genitourinary history otherwise unremarkable  Additional medical comorbidities as listed and reviewed     OBJECTIVE:   On today's visit the patient is lying in bed, comfortable now, abdomen not distended, Luo catheter in place draining yellow clear urine  Initial presenting serum creatinine to the emergency room was 3.0, this morning dropped to 2.24 on Luo catheter drainage  Hemoglobin 12.2 and 10.8  Urinalysis shows a few leukocytes, urine culture is pending  Significant urinary output is now in the range of 3 to 4000 cc  Anticoagulation with Eliquis and aspirin is currently on hold  CT scan was reviewed personally and agree with the report as noted, there is a massively distended bladder on presentation extending above the umbilicus and significant prostate enlargement    ASSESSMENT/PLAN  Acute on chronic urinary retention, with massively distended bladder  Acute kidney injury with elevated serum creatinine as noted  Significant urinary outputs indicating a postobstructive diuresis    Recommend empirically starting Flomax 0.4 mg twice a day and finasteride 5 mg daily  Recheck PSA  Obtain a prostate ultrasound now for better  assessment of size and volume  Due to massive bladder distention I suspect the patient will have an element of atonic bladder for some time therefore we will plan on discharge with indwelling Luo catheter on Flomax and finasteride and return perhaps in about 2 weeks for me personally to remove the catheter, perform diagnostic flexible cystourethroscopy and a voiding trial on an outpatient basis and advise further at that point regarding additional management  Reviewed with patient and agreeable    Thank you for allowing us to participate in the patient's care and management and we will follow along urologically    Additional medical and historical objective data reviewed and listed below      Current Facility-Administered Medications:     [START ON 11/20/2024] amLODIPine (Norvasc) tablet 10 mg, 10 mg, oral, Daily, KLEVER Cuevas-CNP    apixaban (Eliquis) tablet 5 mg, 5 mg, oral, BID, KLEVER Knapp-CNP    aspirin EC tablet 81 mg, 81 mg, oral, Daily, JOSE MARTIN Knapp    [START ON 11/20/2024] cholecalciferol (Vitamin D-3) tablet 2,000 Units, 2,000 Units, oral, Daily, KLEVER Cuevas-CNP    docusate sodium (Colace) capsule 100 mg, 100 mg, oral, BID, KLEVER Cuevas-CNP, 100 mg at 11/19/24 0936    losartan (Cozaar) tablet 25 mg, 25 mg, oral, Daily, JOSE MARTIN Knapp    magnesium oxide (Mag-Ox) tablet 200 mg, 200 mg, oral, Daily, KLEVER Cuevas-CNP, 200 mg at 11/19/24 0136    metoprolol succinate XL (Toprol-XL) 24 hr tablet 50 mg, 50 mg, oral, Nightly, KLEVER Cuevas-CNP, 50 mg at 11/19/24 0137    pravastatin (Pravachol) tablet 20 mg, 20 mg, oral, Nightly, Veronica Perez APRN-CNP, 20 mg at 11/19/24 0136    sotalol (Betapace) tablet 180 mg, 180 mg, oral, BID, JOSE AMRTIN Knapp, 180 mg at 11/19/24 1235    tamsulosin (Flomax) 24 hr capsule 0.4 mg, 0.4 mg, oral, Daily, Shanta Mclean, KLEVER-CNP, 0.4 mg at 11/19/24 1235     Results for orders placed or performed  during the hospital encounter of 11/18/24 (from the past 96 hours)   Coagulation Screen   Result Value Ref Range    Protime 15.2 (H) 9.8 - 12.8 seconds    INR 1.3 (H) 0.9 - 1.1    aPTT 38 27 - 38 seconds   Type and Screen   Result Value Ref Range    ABO TYPE A     Rh TYPE POS     ANTIBODY SCREEN NEG    CBC and Auto Differential   Result Value Ref Range    WBC 8.6 4.4 - 11.3 x10*3/uL    nRBC 0.0 0.0 - 0.0 /100 WBCs    RBC 4.14 (L) 4.50 - 5.90 x10*6/uL    Hemoglobin 12.2 (L) 13.5 - 17.5 g/dL    Hematocrit 37.6 (L) 41.0 - 52.0 %    MCV 91 80 - 100 fL    MCH 29.5 26.0 - 34.0 pg    MCHC 32.4 32.0 - 36.0 g/dL    RDW 12.4 11.5 - 14.5 %    Platelets 490 (H) 150 - 450 x10*3/uL    Neutrophils % 68.8 40.0 - 80.0 %    Immature Granulocytes %, Automated 0.4 0.0 - 0.9 %    Lymphocytes % 16.8 13.0 - 44.0 %    Monocytes % 10.6 2.0 - 10.0 %    Eosinophils % 2.6 0.0 - 6.0 %    Basophils % 0.8 0.0 - 2.0 %    Neutrophils Absolute 5.88 1.20 - 7.70 x10*3/uL    Immature Granulocytes Absolute, Automated 0.03 0.00 - 0.70 x10*3/uL    Lymphocytes Absolute 1.44 1.20 - 4.80 x10*3/uL    Monocytes Absolute 0.91 0.10 - 1.00 x10*3/uL    Eosinophils Absolute 0.22 0.00 - 0.70 x10*3/uL    Basophils Absolute 0.07 0.00 - 0.10 x10*3/uL   Lactate   Result Value Ref Range    Lactate 0.8 0.4 - 2.0 mmol/L   Comprehensive metabolic panel   Result Value Ref Range    Glucose 89 74 - 99 mg/dL    Sodium 140 136 - 145 mmol/L    Potassium 4.1 3.5 - 5.3 mmol/L    Chloride 107 98 - 107 mmol/L    Bicarbonate 24 21 - 32 mmol/L    Anion Gap 13 10 - 20 mmol/L    Urea Nitrogen 36 (H) 6 - 23 mg/dL    Creatinine 2.85 (H) 0.50 - 1.30 mg/dL    eGFR 23 (L) >60 mL/min/1.73m*2    Calcium 9.2 8.6 - 10.3 mg/dL    Albumin 4.0 3.4 - 5.0 g/dL    Alkaline Phosphatase 77 33 - 136 U/L    Total Protein 7.3 6.4 - 8.2 g/dL    AST 19 9 - 39 U/L    Bilirubin, Total 0.6 0.0 - 1.2 mg/dL    ALT 22 10 - 52 U/L   Troponin I, High Sensitivity, Initial   Result Value Ref Range    Troponin I, High  Sensitivity 6 0 - 20 ng/L   ECG 12 lead   Result Value Ref Range    Ventricular Rate 62 BPM    Atrial Rate 62 BPM    AR Interval 156 ms    QRS Duration 88 ms    QT Interval 492 ms    QTC Calculation(Bazett) 499 ms    P Axis 50 degrees    R Axis 14 degrees    T Axis -73 degrees    QRS Count 10 beats    Q Onset 226 ms    P Onset 148 ms    P Offset 218 ms    T Offset 472 ms    QTC Fredericia 497 ms   Urinalysis with Reflex Culture and Microscopic   Result Value Ref Range    Color, Urine Light-Yellow Light-Yellow, Yellow, Dark-Yellow    Appearance, Urine Clear Clear    Specific Gravity, Urine 1.014 1.005 - 1.035    pH, Urine 5.0 5.0, 5.5, 6.0, 6.5, 7.0, 7.5, 8.0    Protein, Urine NEGATIVE NEGATIVE, 10 (TRACE), 20 (TRACE) mg/dL    Glucose, Urine Normal Normal mg/dL    Blood, Urine 0.03 (TRACE) (A) NEGATIVE    Ketones, Urine NEGATIVE NEGATIVE mg/dL    Bilirubin, Urine NEGATIVE NEGATIVE    Urobilinogen, Urine Normal Normal mg/dL    Nitrite, Urine NEGATIVE NEGATIVE    Leukocyte Esterase, Urine 75 Lamont/µL (A) NEGATIVE   Extra Urine Gray Tube   Result Value Ref Range    Extra Tube Hold for add-ons.    Microscopic Only, Urine   Result Value Ref Range    WBC, Urine 11-20 (A) 1-5, NONE /HPF    RBC, Urine 1-2 NONE, 1-2, 3-5 /HPF   Troponin, High Sensitivity, 1 Hour   Result Value Ref Range    Troponin I, High Sensitivity 6 0 - 20 ng/L   SST TOP   Result Value Ref Range    Extra Tube Hold for add-ons.    CBC   Result Value Ref Range    WBC 6.3 4.4 - 11.3 x10*3/uL    nRBC 0.0 0.0 - 0.0 /100 WBCs    RBC 3.66 (L) 4.50 - 5.90 x10*6/uL    Hemoglobin 10.8 (L) 13.5 - 17.5 g/dL    Hematocrit 33.1 (L) 41.0 - 52.0 %    MCV 90 80 - 100 fL    MCH 29.5 26.0 - 34.0 pg    MCHC 32.6 32.0 - 36.0 g/dL    RDW 12.4 11.5 - 14.5 %    Platelets 368 150 - 450 x10*3/uL   Basic metabolic panel   Result Value Ref Range    Glucose 92 74 - 99 mg/dL    Sodium 144 136 - 145 mmol/L    Potassium 4.6 3.5 - 5.3 mmol/L    Chloride 113 (H) 98 - 107 mmol/L     Bicarbonate 25 21 - 32 mmol/L    Anion Gap 11 10 - 20 mmol/L    Urea Nitrogen 30 (H) 6 - 23 mg/dL    Creatinine 2.24 (H) 0.50 - 1.30 mg/dL    eGFR 31 (L) >60 mL/min/1.73m*2    Calcium 8.7 8.6 - 10.3 mg/dL   Calcium, ionized   Result Value Ref Range    POCT Calcium, Ionized 1.18 1.1 - 1.33 mmol/L   Phosphorus   Result Value Ref Range    Phosphorus 4.3 2.5 - 4.9 mg/dL   ECG 12 Lead   Result Value Ref Range    Ventricular Rate 128 BPM    Atrial Rate 129 BPM    QRS Duration 90 ms    QT Interval 266 ms    QTC Calculation(Bazett) 388 ms    R Axis 63 degrees    T Axis 255 degrees    QRS Count 21 beats    Q Onset 227 ms    T Offset 360 ms    QTC Fredericia 342 ms     ECG 12 Lead    Result Date: 11/19/2024  Atrial fibrillation with rapid ventricular response with premature ventricular or aberrantly conducted complexes ST & T wave abnormality, consider inferolateral ischemia Abnormal ECG When compared with ECG of 18-NOV-2024 21:30, Atrial fibrillation has replaced Sinus rhythm Vent. rate has increased BY  66 BPM Inverted T waves have replaced nonspecific T wave abnormality in Inferior leads Nonspecific T wave abnormality has replaced inverted T waves in Anterior leads T wave inversion more evident in Lateral leads    ECG 12 lead    Result Date: 11/19/2024  Normal sinus rhythm ST & T wave abnormality, consider anterolateral ischemia Prolonged QT Abnormal ECG When compared with ECG of 07-NOV-2021 19:21, Sinus rhythm has replaced Atrial fibrillation Vent. rate has decreased BY  33 BPM T wave inversion more evident in Anterior leads See ED provider note for full interpretation and clinical correlation Confirmed by Tawny Bowen ([id number]) on 11/19/2024 11:05:28 AM    CT abdomen pelvis wo IV contrast    Result Date: 11/18/2024  Interpreted By:  Leland Rodas, STUDY: CT ABDOMEN PELVIS WO IV CONTRAST;  11/18/2024 10:54 pm   INDICATION: Signs/Symptoms:abdominal swelling.     COMPARISON: None.   ACCESSION NUMBER(S):  [id number]   ORDERING CLINICIAN: LEVI MCDOWELL   TECHNIQUE: Contiguous axial images of the abdomen and pelvis were obtained without intravenous contrast. Coronal and sagittal reformatted images were reconstructed from the axial data.   FINDINGS: Note: The absence of intravenous contrast limits evaluation of the solid organs and vasculature.   LOWER CHEST: No acute abnormality.     ABDOMEN/PELVIS:   ABDOMINAL WALL: Mild anasarca. There is fluid in the bilateral inguinal canals (right > left).   LIVER: The liver demonstrates a normal noncontrast attenuation. There is a 0.7 cm very low-density lesion in segment 2 consistent with a simple cyst.   BILE DUCTS: No significant intrahepatic or extrahepatic dilatation.   GALLBLADDER: No significant abnormality.   PANCREAS: No significant abnormality.   SPLEEN: No significant abnormality.   ADRENALS: No significant abnormality.   KIDNEYS, URETERS, BLADDER: There is a moderate bilateral hydroureteronephrosis to the level of the bladder. The urinary bladder is severely dilated due to urinary retention. The bladder measures 22.6 cm x 14.3 cm x 18.4 cm. There is perivesical fluid/stranding. The urinary bladder wall is normal in thickness for degree of distention. There is bilateral perinephric edema. There is no renal or ureteral calculus.   REPRODUCTIVE ORGANS: The prostate gland is enlarged, measuring 6.7 cm in transverse dimension.   VESSELS: Mild aortic atherosclerosis without AAA.   RETROPERITONEUM/LYMPH NODES: No acute retroperitoneal abnormality. There are an increased number of prominent but nonenlarged retroperitoneal para-aortic lymph nodes. No enlarged pelvic lymph nodes.   BOWEL/MESENTERY/PERITONEUM: The stomach appears within normal limits for degree of distention. There is a small diverticulum arising from the proximal transverse duodenum. There is also moderate colonic diverticulosis most pronounced within the sigmoid colon. No inflammatory bowel wall thickening or  dilatation. Normal appendix.   No ascites, free air, or fluid collection.     MUSCULOSKELETAL: No acute osseous abnormality. No suspicious osseous lesion. Grade 1 anterolisthesis of L4 on L5. Severe L3-L4 disc height loss, moderate L4-L5 disc height loss.       Severe bladder dilatation due to urinary retention, measuring 22.6 x 14.3 x 18 x 4 cm resulting in upstream bilateral moderate hydroureteronephrosis. There is perivesical fluid/stranding which could be due to any combination of severe wall tension, cystitis, or bladder perforation in order of likelihood. Recommend correlation with urinalysis. Urological consultation/follow-up is recommended.   Enlarged prostate gland that may be responsible for the aforementioned causing bladder outlet obstruction. Benign prostate hyperplasia without superimposed carcinoma is the differential diagnosis. Urological follow-up is recommended.     MACRO: None.   Signed by: Leland Rodas 11/18/2024 11:41 PM Dictation workstation:   [id number]

## 2024-11-19 NOTE — ED PROVIDER NOTES
HPI   Chief Complaint   Patient presents with    Post-op Problem     Dr Benjamin sent him, had a hernia repair 3 weeks ago, but today the surgeon thought he has a hematoma, so he wants him to have a STAT CT with contrast - per wife       History provided by: Patient, family    CC: Abdominal pain    HPI: 69-year-old male with a history of hypertension, cardiomyopathy, A-fib presents the emergency department to be evaluated for abdominal pain.  Patient had a left inguinal hernia repair with mesh placement on 29 October by Dr. Benjamin our general surgery team.  Patient states that the surgery went well however he has been having generalized swelling of the abdomen ever since.  He denies constipation or diarrhea.  Denies nausea or vomiting.  Denies urgency frequency and dysuria.  Denies blood in the urine or stool.  He followed up with Dr. Benjamin as an outpatient who is concerned about the patient's worsening anemia, worsening kidney function, and abdominal swelling as this can represent a postoperative infection or hematoma.  Recommended that the patient be admitted to the hospital under his service after he has basic blood work and a CT abdomen and pelvis.  He originally wanted a CTA however given the patient's kidney function, contrast not recommended at this time.  Patient is on Eliquis due to his history of A-fib and discontinue the Eliquis 5 days before and 5 days after surgery but he is currently taking the Eliquis again.  Patient states that he has had some swelling and bruising of his abdomen ever since the surgery and states that it is slowly getting worse.  Patient denies chest pain and shortness of breath.  With his history of A-fib he also has a history of CHF and is on diuretics.  Denies history of CAD, PE or DVT, asthma.  Denies weakness and fatigue.  Denies fever and chills.  Denies all other systemic symptoms.    ROS: Negative unless mentioned in HPI    Medical Hx: Denies allergies.  Immunizations  up-to-date.    Physical exam:    Constitutional: Patient is well-nourished and well-developed.  Sitting comfortably in the room and in no distress.  Oriented to person, place, time, and situation.    HEENT: Head is normocephalic, atraumatic. Patient's airway is patent.  Tympanic membranes are clear bilaterally.  Nasal mucosa clear.  Mouth with normal mucosa.  Throat is not erythematous and there are no oropharyngeal exudates, uvula is midline.  No obvious facial deformities.    Eyes: Clear bilaterally.  Pupils are equal round and reactive to light and accommodation.  Extraocular movements intact.      Cardiac: Regular rate, regular rhythm.  Heart sounds S1, S2.  No murmurs, rubs, or gallops.  PMI nondisplaced.  No JVD.    Respiratory: Regular respiratory rate and effort.  Breath sounds are clear and equal bilaterally, no adventitious lung sounds.  Patient is speaking in full sentences and is in no apparent respiratory distress. No use of accessory muscles.      Gastrointestinal: Patient has some soft tissue swelling of the upper and middle abdomen bilaterally.  Patient does have some mild soft tissue bruising noted.  Patient's for laparoscopic surgical sites are well-healed and there is no redness, warmth, swelling, Fluctuance or discharge, dehiscence.  No rebound tenderness or guarding.  Bowel sounds are normal active.    Genitourinary: No CVA or flank tenderness.    Musculoskeletal: No reproducible tenderness.  No obvious skin or bony deformities.  Patient has equal range of motion in all extremities and no strength deficiencies.  No muscle or joint tenderness. No back or neck tenderness.  Capillary refill less than 3 seconds.  Strong peripheral pulses.  No sensory deficits.    Neurological: Patient is alert and oriented.  No focal deficits.  5/5 strength in all extremities.  Cranial nerves II through XII intact. GCS15.     Skin: Skin is normal color for race and is warm, dry, and intact.  No evidence of trauma.   No lesions, rashes, bruising, jaundice, or masses.    Psych: Appropriate mood and affect.  No apparent risk to self or others.    Heme/lymph: No adenopathy, lymphadenopathy, or splenomegaly    Physical exam is otherwise negative unless stated above or in history of present illness.              Patient History   Past Medical History:   Diagnosis Date    Abnormal ECG     Abnormal findings on diagnostic imaging of heart and coronary circulation     Elevated coronary artery calcium score    Arrhythmia     Atrial fibrillation (Multi)     Benign prostatic hyperplasia without lower urinary tract symptoms     Enlarged prostate without lower urinary tract symptoms (luts)    Cardiomyopathy     Diverticulosis     Encounter for general adult medical examination without abnormal findings     Health care maintenance    Heart disease     Hypertension     Irregular heart beat     Overweight 01/26/2022    Overweight    Personal history of other infectious and parasitic diseases     History of herpes simplex infection    Vision loss     left eye- detached retina    Visual impairment     Wears glasses      Past Surgical History:   Procedure Laterality Date    BACK SURGERY  1997    CARPAL TUNNEL RELEASE      CERVICAL FUSION      CIRCUMCISION, PRIMARY  1955    COLONOSCOPY      Dr Andersen 2023    EYE SURGERY      HERNIA REPAIR      TONSILLECTOMY  1967    VASECTOMY  1985     Family History   Problem Relation Name Age of Onset    Ulcerative colitis Mother Renee A Sullenberger     Colon cancer Mother Renee A Sullenberger     Cancer Mother Renee A Sullenberger     Colon cancer Father Ulysses G Sullenjennifer     Pancreatic cancer Father Ulysses G Sullenberger     Cancer Father Ulysses G Sullenberger     Atrial fibrillation Brother Butch Rosanne     Ulcerative colitis Brother Butch Rosanne     Cancer Brother Butch Gradydoryjennifer      Social History     Tobacco Use    Smoking status: Former     Current packs/day: 0.00     Average  packs/day: 1 pack/day for 27.7 years (27.7 ttl pk-yrs)     Types: Cigarettes     Start date: 1970     Quit date: 1997     Years since quittin.1     Passive exposure: Past    Smokeless tobacco: Never   Vaping Use    Vaping status: Never Used   Substance Use Topics    Alcohol use: Yes     Alcohol/week: 4.0 standard drinks of alcohol     Types: 4 Shots of liquor per week    Drug use: Yes     Frequency: 1.0 times per week     Types: Marijuana       Physical Exam   ED Triage Vitals [24 2013]   Temperature Heart Rate Respirations BP   36.6 °C (97.9 °F) 65 17 156/72      Pulse Ox Temp Source Heart Rate Source Patient Position   98 % Temporal Monitor Sitting      BP Location FiO2 (%)     Right arm --       Physical Exam      ED Course & MDM   Diagnoses as of 24 2322   Abdominal pain, unspecified abdominal location   Urinary retention        Patient updated on plan for lab testing, IV insertion, radiology imaging, and medications to be administered while in the ER (if indicated). Patient updated on expected wait times for testing and results. Patient provided my name and told to ask any staff member for questions or concerns if they should arise. Electronic medical record reviewed.     MDM    Upon initial assessment, patient was healthy non-toxic appearing and in no apparent distress.     Patient presented to the emergency department with the chief complaint abdominal pain.   Patient has some soft tissue swelling of the upper and middle abdomen bilaterally.  Patient does have some mild soft tissue bruising noted.  Patient's for laparoscopic surgical sites are well-healed and there is no redness, warmth, swelling, Fluctuance or discharge, dehiscence.  No rebound tenderness or guarding.  Bowel sounds are normal active. On arrival to the emergency department, vital signs were within normal limits    Will obtain basic blood work, EKG and troponin, lactate, coagulation screen and type and screen, CT  abdomen and pelvis.    EKG was performed at 2130 and interpreted by me.  Normal sinus rhythm 62 beats minute.  Normal axis.  There is no ST elevation or depression.  Patient has T wave inversion in lead V1, V2, V3, V4, V5.  Patient has had inversions like this in the past.      Patient's original troponin is 6, will get a repeat.  Lactate is 0.8.  CBC reveals a slightly worsening normocytic anemia.  CMP reveals a creatinine of 2.85 and a GFR of 23.  Coagulation screens within normal limits.  Dr. Benjamin  reviewed the patient's CT before it was read by the radiologist and recommended placing a Luo with a coudé tip given that the patient does appear to have urinary tension likely due to his BPH.    CT shows significant urinary retention and BPH which appears to be the cause of the patient's symptoms. Luo will be placed by the nursing staff.  Patient will then be admitted to the general surgery services.  Patient verbalized understanding and agreement with the treatment plan and he remained hemodynamically stable in the ER.    This note was dictated using a speech recognition program.  While an attempt was made at proof-reading to minimize errors, minor errors in transcription may be present         No data recorded     Lejunior Coma Scale Score: 15 (11/18/24 2015 : Celina Suazo RN)                           Medical Decision Making      Procedure  Procedures     Carlos Wallace PA-C  11/18/24 8990       Carlos Wallace PA-C  11/18/24 [id number]

## 2024-11-19 NOTE — CONSULTS
Inpatient consult to Cardiology  Consult performed by: JOSE MARTIN Martinez  Consult ordered by: JOSE MARTIN Lainez  Reason for consult: Persistent atrial fibrillation            Cardiology Consult Note  Patient: Thomas Lay  Unit/Bed: 909/909-B  YOB: 1955  MRN: [id number]  Acct: [id number]   Admitting Diagnosis: Urinary retention [R33.9]  MARILIA (acute kidney injury) (CMS-HCC) [N17.9]  Abdominal pain, unspecified abdominal location [R10.9]  Date:  11/18/2024  Hospital Day: 0  Attending: Jose Benjamin MD    Consultant: Dr. Paula Torres  / Wanda Jimenez CNP  Primary Cardiologist: Dr. Mihir Calle      Complaint:  Chief Complaint   Patient presents with    Post-op Problem     Dr Benjamin sent him, had a hernia repair 3 weeks ago, but today the surgeon thought he has a hematoma, so he wants him to have a STAT CT with contrast - per wife        History of Present Illness:  69-year-old male evaluated in [facility] emergency room secondary to large suprapubic mass concerning for postoperative hematoma post laparoscopic repair of left inguinal hernia with mesh performed on 10/29/2024.  Initial blood work showed a creatinine of 3 with baseline of 1.01-1.15 and hemoglobin of 12.5 with baseline 13.5-15.3.  He was treated in the emergency room with a aggressive fluid resuscitation and underwent a CT without contrast to assess the hematoma.  CT showed severe bladder dilation due to urinary retention resulting in upstream bilateral moderate hydroureteronephrosis and enlarged prostate gland.  This a.m. all home medications were resumed, however patient did miss a dose of sotalol last evening.  At approximately noon today patient went into atrial fibrillation with RVR heart rates ranging from 80 up to 160 bpm.  He is currently on anticoagulation with Eliquis, sotalol 180 mg twice daily, and metoprolol succinate 50 mg daily.  His creatinine has improved to 2.24 and K is 4.6.   EP service was asked to see and evaluate patient regarding atrial fibrillation with rapid ventricular rate.  Past medical history includes nonischemic cardiomyopathy with LVEF once 30% improved to 60% per echocardiogram December 13, 2021, persistent atrial fibrillation maintained on sotalol 180 mg twice daily, metoprolol, anticoagulation with Eliquis, and magnesium, hypertension, CAD, dyslipidemia, and overweight with a BMI of 28.7.  Patient follows on an outpatient basis with Dr. Calle and also Dr. Dupree.        Allergies:  Allergies   Allergen Reactions    Adhesive Tape-Silicones Unknown    Percocet [Oxycodone-Acetaminophen] Unknown        PMHx:  Past Medical History:   Diagnosis Date    Abnormal ECG     Abnormal findings on diagnostic imaging of heart and coronary circulation     Elevated coronary artery calcium score    Arrhythmia     Atrial fibrillation (Multi)     Benign prostatic hyperplasia without lower urinary tract symptoms     Enlarged prostate without lower urinary tract symptoms (luts)    Cardiomyopathy     Diverticulosis     Encounter for general adult medical examination without abnormal findings     Health care maintenance    Heart disease     Hypertension     Irregular heart beat     Overweight 01/26/2022    Overweight    Personal history of other infectious and parasitic diseases     History of herpes simplex infection    Vision loss     left eye- detached retina    Visual impairment     Wears glasses        PSHx:  Past Surgical History:   Procedure Laterality Date    BACK SURGERY  1997    CARPAL TUNNEL RELEASE      CERVICAL FUSION      CIRCUMCISION, PRIMARY  1955    COLONOSCOPY      Dr Andersen 2023    EYE SURGERY      HERNIA REPAIR      TONSILLECTOMY  1967    VASECTOMY  1985       Social Hx:  Social History     Socioeconomic History    Marital status:    Tobacco Use    Smoking status: Former     Current packs/day: 0.00     Average packs/day: 1 pack/day for 27.7 years (27.7 ttl  pk-yrs)     Types: Cigarettes     Start date: 1970     Quit date: 1997     Years since quittin.1     Passive exposure: Past    Smokeless tobacco: Never   Vaping Use    Vaping status: Never Used   Substance and Sexual Activity    Alcohol use: Yes     Alcohol/week: 4.0 standard drinks of alcohol     Types: 4 Shots of liquor per week    Drug use: Yes     Frequency: 1.0 times per week     Types: Marijuana    Sexual activity: Defer     Partners: Female     Birth control/protection: Male Sterilization     Social Drivers of Health     Financial Resource Strain: Low Risk  (2024)    Overall Financial Resource Strain (CARDIA)     Difficulty of Paying Living Expenses: Not hard at all   Food Insecurity: No Food Insecurity (2024)    Hunger Vital Sign     Worried About Running Out of Food in the Last Year: Never true     Ran Out of Food in the Last Year: Never true   Transportation Needs: No Transportation Needs (2024)    PRAPARE - Transportation     Lack of Transportation (Medical): No     Lack of Transportation (Non-Medical): No   Intimate Partner Violence: Not At Risk (2024)    Humiliation, Afraid, Rape, and Kick questionnaire     Fear of Current or Ex-Partner: No     Emotionally Abused: No     Physically Abused: No     Sexually Abused: No   Housing Stability: Low Risk  (2024)    Housing Stability Vital Sign     Unable to Pay for Housing in the Last Year: No     Number of Times Moved in the Last Year: 0     Homeless in the Last Year: No       Family Hx:  Family History   Problem Relation Name Age of Onset    Ulcerative colitis Mother Renee A Ysabellenberger     Colon cancer Mother Renee A Ysabellenberger     Cancer Mother Renee A Ysabellenberger     Colon cancer Father Ulysses G Rosanne     Pancreatic cancer Father Ulysses G Sullenjennifer     Cancer Father Ulysses G Rosanne     Atrial fibrillation Brother Butch Rosanne     Ulcerative colitis Brother Butch Rosanne      Cancer Brother Butch Lay        Review of Systems:   Review of Systems   All other systems reviewed and are negative.        Physical Examination:    Visit Vitals  /83 (Patient Position: Lying)   Pulse 92   Temp 36.6 °C (97.9 °F) (Temporal)   Resp 16      Physical Exam  Constitutional:       Appearance: Normal appearance.   HENT:      Head: Normocephalic.   Eyes:      Conjunctiva/sclera: Conjunctivae normal.   Cardiovascular:      Rate and Rhythm: Tachycardia present. Rhythm irregular.      Pulses: Normal pulses.      Heart sounds: Normal heart sounds.   Pulmonary:      Effort: Pulmonary effort is normal.      Breath sounds: Normal breath sounds.   Musculoskeletal:         General: Normal range of motion.   Skin:     General: Skin is warm and dry.   Neurological:      General: No focal deficit present.      Mental Status: He is alert and oriented to person, place, and time.   Psychiatric:         Mood and Affect: Mood normal.         Behavior: Behavior normal.         LABS:  Results for orders placed or performed during the hospital encounter of 11/18/24 (from the past 96 hours)   Coagulation Screen   Result Value Ref Range    Protime 15.2 (H) 9.8 - 12.8 seconds    INR 1.3 (H) 0.9 - 1.1    aPTT 38 27 - 38 seconds   Type and Screen   Result Value Ref Range    ABO TYPE A     Rh TYPE POS     ANTIBODY SCREEN NEG    CBC and Auto Differential   Result Value Ref Range    WBC 8.6 4.4 - 11.3 x10*3/uL    nRBC 0.0 0.0 - 0.0 /100 WBCs    RBC 4.14 (L) 4.50 - 5.90 x10*6/uL    Hemoglobin 12.2 (L) 13.5 - 17.5 g/dL    Hematocrit 37.6 (L) 41.0 - 52.0 %    MCV 91 80 - 100 fL    MCH 29.5 26.0 - 34.0 pg    MCHC 32.4 32.0 - 36.0 g/dL    RDW 12.4 11.5 - 14.5 %    Platelets 490 (H) 150 - 450 x10*3/uL    Neutrophils % 68.8 40.0 - 80.0 %    Immature Granulocytes %, Automated 0.4 0.0 - 0.9 %    Lymphocytes % 16.8 13.0 - 44.0 %    Monocytes % 10.6 2.0 - 10.0 %    Eosinophils % 2.6 0.0 - 6.0 %    Basophils % 0.8 0.0 - 2.0 %     Neutrophils Absolute 5.88 1.20 - 7.70 x10*3/uL    Immature Granulocytes Absolute, Automated 0.03 0.00 - 0.70 x10*3/uL    Lymphocytes Absolute 1.44 1.20 - 4.80 x10*3/uL    Monocytes Absolute 0.91 0.10 - 1.00 x10*3/uL    Eosinophils Absolute 0.22 0.00 - 0.70 x10*3/uL    Basophils Absolute 0.07 0.00 - 0.10 x10*3/uL   Lactate   Result Value Ref Range    Lactate 0.8 0.4 - 2.0 mmol/L   Comprehensive metabolic panel   Result Value Ref Range    Glucose 89 74 - 99 mg/dL    Sodium 140 136 - 145 mmol/L    Potassium 4.1 3.5 - 5.3 mmol/L    Chloride 107 98 - 107 mmol/L    Bicarbonate 24 21 - 32 mmol/L    Anion Gap 13 10 - 20 mmol/L    Urea Nitrogen 36 (H) 6 - 23 mg/dL    Creatinine 2.85 (H) 0.50 - 1.30 mg/dL    eGFR 23 (L) >60 mL/min/1.73m*2    Calcium 9.2 8.6 - 10.3 mg/dL    Albumin 4.0 3.4 - 5.0 g/dL    Alkaline Phosphatase 77 33 - 136 U/L    Total Protein 7.3 6.4 - 8.2 g/dL    AST 19 9 - 39 U/L    Bilirubin, Total 0.6 0.0 - 1.2 mg/dL    ALT 22 10 - 52 U/L   Troponin I, High Sensitivity, Initial   Result Value Ref Range    Troponin I, High Sensitivity 6 0 - 20 ng/L   ECG 12 lead   Result Value Ref Range    Ventricular Rate 62 BPM    Atrial Rate 62 BPM    MO Interval 156 ms    QRS Duration 88 ms    QT Interval 492 ms    QTC Calculation(Bazett) 499 ms    P Axis 50 degrees    R Axis 14 degrees    T Axis -73 degrees    QRS Count 10 beats    Q Onset 226 ms    P Onset 148 ms    P Offset 218 ms    T Offset 472 ms    QTC Fredericia 497 ms   Urinalysis with Reflex Culture and Microscopic   Result Value Ref Range    Color, Urine Light-Yellow Light-Yellow, Yellow, Dark-Yellow    Appearance, Urine Clear Clear    Specific Gravity, Urine 1.014 1.005 - 1.035    pH, Urine 5.0 5.0, 5.5, 6.0, 6.5, 7.0, 7.5, 8.0    Protein, Urine NEGATIVE NEGATIVE, 10 (TRACE), 20 (TRACE) mg/dL    Glucose, Urine Normal Normal mg/dL    Blood, Urine 0.03 (TRACE) (A) NEGATIVE    Ketones, Urine NEGATIVE NEGATIVE mg/dL    Bilirubin, Urine NEGATIVE NEGATIVE     Urobilinogen, Urine Normal Normal mg/dL    Nitrite, Urine NEGATIVE NEGATIVE    Leukocyte Esterase, Urine 75 [place]ont/µL (A) NEGATIVE   Extra Urine Gray Tube   Result Value Ref Range    Extra Tube Hold for add-ons.    Microscopic Only, Urine   Result Value Ref Range    WBC, Urine 11-20 (A) 1-5, NONE /HPF    RBC, Urine 1-2 NONE, 1-2, 3-5 /HPF   Troponin, High Sensitivity, 1 Hour   Result Value Ref Range    Troponin I, High Sensitivity 6 0 - 20 ng/L   SST TOP   Result Value Ref Range    Extra Tube Hold for add-ons.    CBC   Result Value Ref Range    WBC 6.3 4.4 - 11.3 x10*3/uL    nRBC 0.0 0.0 - 0.0 /100 WBCs    RBC 3.66 (L) 4.50 - 5.90 x10*6/uL    Hemoglobin 10.8 (L) 13.5 - 17.5 g/dL    Hematocrit 33.1 (L) 41.0 - 52.0 %    MCV 90 80 - 100 fL    MCH 29.5 26.0 - 34.0 pg    MCHC 32.6 32.0 - 36.0 g/dL    RDW 12.4 11.5 - 14.5 %    Platelets 368 150 - 450 x10*3/uL   Basic metabolic panel   Result Value Ref Range    Glucose 92 74 - 99 mg/dL    Sodium 144 136 - 145 mmol/L    Potassium 4.6 3.5 - 5.3 mmol/L    Chloride 113 (H) 98 - 107 mmol/L    Bicarbonate 25 21 - 32 mmol/L    Anion Gap 11 10 - 20 mmol/L    Urea Nitrogen 30 (H) 6 - 23 mg/dL    Creatinine 2.24 (H) 0.50 - 1.30 mg/dL    eGFR 31 (L) >60 mL/min/1.73m*2    Calcium 8.7 8.6 - 10.3 mg/dL   Calcium, ionized   Result Value Ref Range    POCT Calcium, Ionized 1.18 1.1 - 1.33 mmol/L   Phosphorus   Result Value Ref Range    Phosphorus 4.3 2.5 - 4.9 mg/dL   ECG 12 Lead   Result Value Ref Range    Ventricular Rate 128 BPM    Atrial Rate 129 BPM    QRS Duration 90 ms    QT Interval 266 ms    QTC Calculation(Bazett) 388 ms    R Axis 63 degrees    T Axis 255 degrees    QRS Count 21 beats    Q Onset 227 ms    T Offset 360 ms    QTC Fredericia 342 ms          Current Medications:  Scheduled medications  [START ON 11/20/2024] amLODIPine, 10 mg, oral, Daily  apixaban, 5 mg, oral, BID  aspirin, 81 mg, oral, Daily  [START ON 11/20/2024] cholecalciferol, 2,000 Units, oral, Daily  docusate  sodium, 100 mg, oral, BID  finasteride, 5 mg, oral, Daily  losartan, 25 mg, oral, Daily  magnesium oxide, 200 mg, oral, Daily  metoprolol succinate XL, 75 mg, oral, Nightly  pravastatin, 20 mg, oral, Nightly  sodium phosphates, 1 enema, rectal, Once  sotalol, 180 mg, oral, BID  tamsulosin, 0.4 mg, oral, BID      Continuous medications     PRN medications  PRN medications: metoprolol tartrate     CT head wo IV contrast    Result Date: 10/18/2023  Interpreted By:  Raudel Fierro, STUDY: CT HEAD WO IV CONTRAST;  10/18/2023 7:02 am   INDICATION: Signs/Symptoms:AMS.   COMPARISON: CT Brain, 4 March 2020   ACCESSION NUMBER(S): [id number]   ORDERING CLINICIAN: GARY LORENZO   TECHNIQUE: CT of the brain from the skull vertex to the skull base, without intravenous contrast   FINDINGS: ACUTE INTRA-AXIAL HEMORRHAGE:  Negative   ACUTE EXTRA-AXIAL/SUBDURAL HEMORRHAGE:  Negative   ACUTE INTRACRANIAL MASS EFFECT:  Negative   CT EVIDENCE OF ACUTE / SUBACUTE TERRITORIAL ISCHEMIA:  Negative   VENTRICLES:  Unchanged diffuse dilation. No acute obstructive hydrocephalus by CT   OTHER BRAIN FINDINGS:  No significant interval change to the CT appearance of the brain including the degree of atrophy and deep white matter changes from chronic microvascular disease and encephalomalacia in the left occipital lobe from old infarct and another in or near the left thalamus   INCLUDED PARANASAL SINUSES: All clear   INCLUDED MASTOID AIR CELLS: All clear   SKULL:  No lytic or blastic lesion   EXTRACRANIAL SOFT TISSUES:  Scalp and occular globes grossly normal by CT       NO ACUTE INTRACRANIAL PROCESS   NO SIGNIFICANT INTERVAL CHANGE TO THE CT APPEARANCE OF THE BRAIN WHEN COMPARED TO 4 MARCH 2020   MACRO: None   Signed by: Raudel Fierro 10/18/2023 7:59 AM Dictation workstation:   [id number]    CT abdomen pelvis w IV contrast    Result Date: 10/18/2023  Interpreted By:  Linda Driscoll, STUDY: CT ABDOMEN PELVIS W IV CONTRAST;  10/18/2023 12:46 am    INDICATION: Signs/Symptoms:vomiting.   COMPARISON: None.   ACCESSION NUMBER(S): [id number]   ORDERING CLINICIAN: MONAE JOHNSTON   TECHNIQUE: Axial CT images of the abdomen and pelvis with coronal and sagittal reconstructed images obtained after intravenous administration of contrast   FINDINGS: LOWER CHEST: Mild bibasilar dependent atelectasis. Pacemaker/AICD leads noted the right atrium and right ventricle. Mitral annulus calcifications noted. BONES: No acute osseous abnormality. Mild multilevel degenerative disc changes. ABDOMINAL WALL: Within normal limits.   ABDOMEN:   LIVER: Within normal limits. BILE DUCTS: No biliary dilatation. GALLBLADDER: No calcified gallstones. No pericholecystic inflammatory changes. PANCREAS: Within normal limits. SPLEEN: Within normal limits. ADRENALS: Within normal limits. KIDNEYS and URETERS: Partial staghorn calculus versus numerous layering calculi in the right kidney. Mild right hydronephrosis. Mild right urothelial thickening, particularly in the distal ureter. No calculus along the course of the ureter. No parenchymal abnormality to suggest pyelonephritis. No hydronephrosis on the left.     VESSELS: No aortic aneurysm. RETROPERITONEUM: No pathologically enlarged retroperitoneal lymph nodes.   PELVIS:   REPRODUCTIVE ORGANS: No pelvic masses. BLADDER: Diffuse urinary bladder wall thickening and mucosal hyperenhancement.   BOWEL: The stomach is moderately distended. No dilated small bowel. Large amount of stool in the rectum. There is significant narrowing of the ascending colon and cecum, with the small bowel loops extending lateral to the cecum and ascending colon (series 301, image 128). The appendix is not seen with certainty. There are no pericecal inflammatory changes to suggest acute appendicitis. PERITONEUM: No ascites or free air, no fluid collection.       Findings suspicious for cystitis with possible right ascending UTI/pyelitis.   Large amount of stool in the rectum  suggesting fecal impaction.   Suspected internal pericecal hernia. No evidence of bowel obstruction.   MACRO: None   Signed by: Linda Driscoll 10/18/2023 1:42 AM Dictation workstation:   [id number]    XR chest 1 view    Result Date: 10/18/2023  Interpreted By:  Leland Rodas, STUDY: XR CHEST 1 VIEW;  10/17/2023 11:16 pm   INDICATION: Signs/Symptoms:nausea and vomiting.   COMPARISON: None.   ACCESSION NUMBER(S): [id number]   ORDERING CLINICIAN: MONAE JOHNSTON   FINDINGS: Dual lead left-sided pacemaker.   The cardiomediastinal silhouette and pulmonary vasculature are within normal limits. No consolidation, pleural effusion or pneumothorax.   Chronic right mid clavicle fracture.       No acute cardiopulmonary process.     MACRO: None.   Signed by: Leland Rodas 10/18/2023 12:15 AM Dictation workstation:   [id number]     .No echocardiogram results found for the past 14 days     Encounter Date: 11/18/24   ECG 12 Lead   Result Value    Ventricular Rate 128    Atrial Rate 129    QRS Duration 90    QT Interval 266    QTC Calculation(Bazett) 388    R Axis 63    T Axis 255    QRS Count 21    Q Onset 227    T Offset 360    QTC Fredericia 342    Narrative    Atrial fibrillation with rapid ventricular response with premature ventricular or aberrantly conducted complexes  ST & T wave abnormality, consider inferolateral ischemia  Abnormal ECG  When compared with ECG of 18-NOV-2024 21:30,  Atrial fibrillation has replaced Sinus rhythm  Vent. rate has increased BY  66 BPM  Inverted T waves have replaced nonspecific T wave abnormality in Inferior leads  Nonspecific T wave abnormality has replaced inverted T waves in Anterior leads  T wave inversion more evident in Lateral leads            I/O:    Intake/Output Summary (Last 24 hours) at 11/19/2024 1543  Last data filed at 11/19/2024 1145  Gross per 24 hour   Intake 2348.34 ml   Output 8500 ml   Net -6151.66 ml        Assessment:    Patient Active Problem List   Diagnosis     Essential hypertension    CAD (coronary artery disease)    Cardiomyopathy    CHF (congestive heart failure), NYHA class II    Elevated glucose    Elevated PSA    Herpes simplex virus infection    Mixed hyperlipidemia    Paroxysmal atrial fibrillation (Multi)    Positive colorectal cancer screening using Cologuard test    Hyperglycemia    Sigmoid diverticulosis    Adenomatous polyp of colon    Vitamin D deficiency    [place] cardiac risk 10-20% in next 10 years    Former cigarette smoker    BMI 27.0-27.9,adult    Non-recurrent unilateral inguinal hernia without obstruction or gangrene    S/P inguinal hernia repair using synthetic patch    Hematoma    MARILIA (acute kidney injury) (CMS-HCC)    Abdominal pain, unspecified abdominal location   Clinical impression:  1.  Persistent atrial fibrillation  2.  Enlarged bladder with urinary retention and bilateral moderate hydroureteronephrosis requiring indwelling Luo catheter  3.  MARILIA improving  4.  Status post laparoscopic repair of a large left inguinal hernia with mesh placement on 10/29/2024  5.  Nonischemic cardiomyopathy with LVEF once 30% improved to 60% per echocardiogram December 13, 2021 [place] [employer] association functional class II stage B heart failure  6.  Hypertension  7.  Coronary artery disease  8.  Dyslipidemia    Plan:  Continue sotalol  Continue anticoagulation with Eliquis  Increase metoprolol succinate to 75 mg daily at bedtime  Metoprolol 2.5 mg IV every 6 hours as needed for sustained heart rate of 130 bpm for more than 20 minutes  NPO except water after midnight tonight - consider BEENA/DCC in am if remains in atrial fibrillation.  Discussed BEENA/DCC with patient - he will consider procedure.      Electronically signed by JOSE MARTIN Martinez on 11/19/2024 at 3:43 PM            I have personally reviewed the data.    In addition,    Personally reviewed ECG and tele    Tele  AF with rapid ventricular rate    Impression  Atrial fibrillation,  persistent; temporarily off sotalol and anticoagulation, trigger in acute setting of increased vagal tone with urinary retention and hydroureteronephrosis  Continue monitoring  Rate control  BEENA and CVN in 48 hours if remains in atrial fibrillation  Greater than 50% visit spent to discuss arrhythmia, treatment options, and management plan by EP service

## 2024-11-19 NOTE — CARE PLAN
Problem: Fall/Injury  Goal: Not fall by end of shift  Outcome: Progressing     The patient's goals for the shift include comfort/safety    The clinical goals for the shift include pt safety

## 2024-11-19 NOTE — PROGRESS NOTES
General Surgery Progress Note    Patient: Thomas Lay  Unit/Bed: 909/909-B  YOB: 1955  MRN: [id number]  Acct: [id number]   Admitting Diagnosis: Urinary retention [R33.9]  MARILIA (acute kidney injury) (CMS-HCC) [N17.9]  Abdominal pain, unspecified abdominal location [R10.9]  Date:  11/18/2024  Hospital Day: 0  Attending: Jose Benjamin MD    Complaint:  Chief Complaint   Patient presents with    Post-op Problem     Dr Benjamin sent him, had a hernia repair 3 weeks ago, but today the surgeon thought he has a hematoma, so he wants him to have a STAT CT with contrast - per wife      Subjective  Patient seen and examined this morning. No acute events overnight.  Patient reports that his abdominal distention is gone after the Luo was placed.  Patient states that he has never had an issue urinating before.     PHYSICAL EXAM:  Physical Exam  Vitals and nursing note reviewed.   Constitutional:       General: He is awake.      Appearance: Normal appearance. He is overweight.   HENT:      Head: Normocephalic.      Nose: Nose normal.      Mouth/Throat:      Mouth: Mucous membranes are moist.   Cardiovascular:      Rate and Rhythm: Normal rate and regular rhythm.      Heart sounds: Normal heart sounds.   Pulmonary:      Effort: Pulmonary effort is normal.      Breath sounds: Normal breath sounds.   Abdominal:      General: Abdomen is flat. Bowel sounds are normal.      Palpations: Abdomen is soft.      Comments: Lap sites dry and intact, some surgical glue remains in place   Genitourinary:     Comments: Luo catheter 16F coude tip - red clear urine noted in tubing  Skin:     General: Skin is warm and dry.      Capillary Refill: Capillary refill takes less than 2 seconds.   Neurological:      General: No focal deficit present.      Mental Status: He is alert and oriented to person, place, and time.   Psychiatric:         Attention and Perception: Attention and perception normal.         Mood and Affect: Mood  and affect normal.         Speech: Speech normal.         Behavior: Behavior normal. Behavior is cooperative.         Thought Content: Thought content normal.         Cognition and Memory: Cognition normal.         Judgment: Judgment normal.       Vital signs in last 24 hours:  Vitals:    11/19/24 0753   BP: 151/73   Pulse: 70   Resp: 16   Temp:    SpO2: 97%     Intake/Output this shift:    Intake/Output Summary (Last 24 hours) at 11/19/2024 1207  Last data filed at 11/19/2024 1145  Gross per 24 hour   Intake 2348.34 ml   Output 8500 ml   Net -6151.66 ml      Allergies:  Allergies   Allergen Reactions    Adhesive Tape-Silicones Unknown    Percocet [Oxycodone-Acetaminophen] Unknown      Medications:  Scheduled medications  [START ON 11/20/2024] amLODIPine, 10 mg, oral, Daily  apixaban, 5 mg, oral, BID  aspirin, 81 mg, oral, Daily  [START ON 11/20/2024] cholecalciferol, 2,000 Units, oral, Daily  docusate sodium, 100 mg, oral, BID  losartan, 25 mg, oral, Daily  magnesium oxide, 200 mg, oral, Daily  metoprolol succinate XL, 50 mg, oral, Nightly  pravastatin, 20 mg, oral, Nightly  tamsulosin, 0.4 mg, oral, Daily      Continuous medications     PRN medications    Labs:  Results for orders placed or performed during the hospital encounter of 11/18/24 (from the past 24 hours)   Coagulation Screen   Result Value Ref Range    Protime 15.2 (H) 9.8 - 12.8 seconds    INR 1.3 (H) 0.9 - 1.1    aPTT 38 27 - 38 seconds   Type and Screen   Result Value Ref Range    ABO TYPE A     Rh TYPE POS     ANTIBODY SCREEN NEG    CBC and Auto Differential   Result Value Ref Range    WBC 8.6 4.4 - 11.3 x10*3/uL    nRBC 0.0 0.0 - 0.0 /100 WBCs    RBC 4.14 (L) 4.50 - 5.90 x10*6/uL    Hemoglobin 12.2 (L) 13.5 - 17.5 g/dL    Hematocrit 37.6 (L) 41.0 - 52.0 %    MCV 91 80 - 100 fL    MCH 29.5 26.0 - 34.0 pg    MCHC 32.4 32.0 - 36.0 g/dL    RDW 12.4 11.5 - 14.5 %    Platelets 490 (H) 150 - 450 x10*3/uL    Neutrophils % 68.8 40.0 - 80.0 %    Immature  Granulocytes %, Automated 0.4 0.0 - 0.9 %    Lymphocytes % 16.8 13.0 - 44.0 %    Monocytes % 10.6 2.0 - 10.0 %    Eosinophils % 2.6 0.0 - 6.0 %    Basophils % 0.8 0.0 - 2.0 %    Neutrophils Absolute 5.88 1.20 - 7.70 x10*3/uL    Immature Granulocytes Absolute, Automated 0.03 0.00 - 0.70 x10*3/uL    Lymphocytes Absolute 1.44 1.20 - 4.80 x10*3/uL    Monocytes Absolute 0.91 0.10 - 1.00 x10*3/uL    Eosinophils Absolute 0.22 0.00 - 0.70 x10*3/uL    Basophils Absolute 0.07 0.00 - 0.10 x10*3/uL   Lactate   Result Value Ref Range    Lactate 0.8 0.4 - 2.0 mmol/L   Comprehensive metabolic panel   Result Value Ref Range    Glucose 89 74 - 99 mg/dL    Sodium 140 136 - 145 mmol/L    Potassium 4.1 3.5 - 5.3 mmol/L    Chloride 107 98 - 107 mmol/L    Bicarbonate 24 21 - 32 mmol/L    Anion Gap 13 10 - 20 mmol/L    Urea Nitrogen 36 (H) 6 - 23 mg/dL    Creatinine 2.85 (H) 0.50 - 1.30 mg/dL    eGFR 23 (L) >60 mL/min/1.73m*2    Calcium 9.2 8.6 - 10.3 mg/dL    Albumin 4.0 3.4 - 5.0 g/dL    Alkaline Phosphatase 77 33 - 136 U/L    Total Protein 7.3 6.4 - 8.2 g/dL    AST 19 9 - 39 U/L    Bilirubin, Total 0.6 0.0 - 1.2 mg/dL    ALT 22 10 - 52 U/L   Troponin I, High Sensitivity, Initial   Result Value Ref Range    Troponin I, High Sensitivity 6 0 - 20 ng/L   ECG 12 lead   Result Value Ref Range    Ventricular Rate 62 BPM    Atrial Rate 62 BPM    IN Interval 156 ms    QRS Duration 88 ms    QT Interval 492 ms    QTC Calculation(Bazett) 499 ms    P Axis 50 degrees    R Axis 14 degrees    T Axis -73 degrees    QRS Count 10 beats    Q Onset 226 ms    P Onset 148 ms    P Offset 218 ms    T Offset 472 ms    QTC Fredericia 497 ms   Urinalysis with Reflex Culture and Microscopic   Result Value Ref Range    Color, Urine Light-Yellow Light-Yellow, Yellow, Dark-Yellow    Appearance, Urine Clear Clear    Specific Gravity, Urine 1.014 1.005 - 1.035    pH, Urine 5.0 5.0, 5.5, 6.0, 6.5, 7.0, 7.5, 8.0    Protein, Urine NEGATIVE NEGATIVE, 10 (TRACE), 20 (TRACE)  mg/dL    Glucose, Urine Normal Normal mg/dL    Blood, Urine 0.03 (TRACE) (A) NEGATIVE    Ketones, Urine NEGATIVE NEGATIVE mg/dL    Bilirubin, Urine NEGATIVE NEGATIVE    Urobilinogen, Urine Normal Normal mg/dL    Nitrite, Urine NEGATIVE NEGATIVE    Leukocyte Esterase, Urine 75 Lamont/µL (A) NEGATIVE   Extra Urine Gray Tube   Result Value Ref Range    Extra Tube Hold for add-ons.    Microscopic Only, Urine   Result Value Ref Range    WBC, Urine 11-20 (A) 1-5, NONE /HPF    RBC, Urine 1-2 NONE, 1-2, 3-5 /HPF   Troponin, High Sensitivity, 1 Hour   Result Value Ref Range    Troponin I, High Sensitivity 6 0 - 20 ng/L   SST TOP   Result Value Ref Range    Extra Tube Hold for add-ons.    CBC   Result Value Ref Range    WBC 6.3 4.4 - 11.3 x10*3/uL    nRBC 0.0 0.0 - 0.0 /100 WBCs    RBC 3.66 (L) 4.50 - 5.90 x10*6/uL    Hemoglobin 10.8 (L) 13.5 - 17.5 g/dL    Hematocrit 33.1 (L) 41.0 - 52.0 %    MCV 90 80 - 100 fL    MCH 29.5 26.0 - 34.0 pg    MCHC 32.6 32.0 - 36.0 g/dL    RDW 12.4 11.5 - 14.5 %    Platelets 368 150 - 450 x10*3/uL   Basic metabolic panel   Result Value Ref Range    Glucose 92 74 - 99 mg/dL    Sodium 144 136 - 145 mmol/L    Potassium 4.6 3.5 - 5.3 mmol/L    Chloride 113 (H) 98 - 107 mmol/L    Bicarbonate 25 21 - 32 mmol/L    Anion Gap 11 10 - 20 mmol/L    Urea Nitrogen 30 (H) 6 - 23 mg/dL    Creatinine 2.24 (H) 0.50 - 1.30 mg/dL    eGFR 31 (L) >60 mL/min/1.73m*2    Calcium 8.7 8.6 - 10.3 mg/dL   Calcium, ionized   Result Value Ref Range    POCT Calcium, Ionized 1.18 1.1 - 1.33 mmol/L   Phosphorus   Result Value Ref Range    Phosphorus 4.3 2.5 - 4.9 mg/dL      Imaging:  ECG 12 lead    Result Date: 11/19/2024  Normal sinus rhythm ST & T wave abnormality, consider anterolateral ischemia Prolonged QT Abnormal ECG When compared with ECG of 07-NOV-2021 19:21, Sinus rhythm has replaced Atrial fibrillation Vent. rate has decreased BY  33 BPM T wave inversion more evident in Anterior leads See ED provider note for full  interpretation and clinical correlation Confirmed by Tawny Bowen ([id number]) on 11/19/2024 11:05:28 AM    CT abdomen pelvis wo IV contrast    Result Date: 11/18/2024  Interpreted By:  Leland Rodas, STUDY: CT ABDOMEN PELVIS WO IV CONTRAST;  11/18/2024 10:54 pm   INDICATION: Signs/Symptoms:abdominal swelling.     COMPARISON: None.   ACCESSION NUMBER(S): [id number]   ORDERING CLINICIAN: LEVI MCDOWELL   TECHNIQUE: Contiguous axial images of the abdomen and pelvis were obtained without intravenous contrast. Coronal and sagittal reformatted images were reconstructed from the axial data.   FINDINGS: Note: The absence of intravenous contrast limits evaluation of the solid organs and vasculature.   LOWER CHEST: No acute abnormality.     ABDOMEN/PELVIS:   ABDOMINAL WALL: Mild anasarca. There is fluid in the bilateral inguinal canals (right > left).   LIVER: The liver demonstrates a normal noncontrast attenuation. There is a 0.7 cm very low-density lesion in segment 2 consistent with a simple cyst.   BILE DUCTS: No significant intrahepatic or extrahepatic dilatation.   GALLBLADDER: No significant abnormality.   PANCREAS: No significant abnormality.   SPLEEN: No significant abnormality.   ADRENALS: No significant abnormality.   KIDNEYS, URETERS, BLADDER: There is a moderate bilateral hydroureteronephrosis to the level of the bladder. The urinary bladder is severely dilated due to urinary retention. The bladder measures 22.6 cm x 14.3 cm x 18.4 cm. There is perivesical fluid/stranding. The urinary bladder wall is normal in thickness for degree of distention. There is bilateral perinephric edema. There is no renal or ureteral calculus.   REPRODUCTIVE ORGANS: The prostate gland is enlarged, measuring 6.7 cm in transverse dimension.   VESSELS: Mild aortic atherosclerosis without AAA.   RETROPERITONEUM/LYMPH NODES: No acute retroperitoneal abnormality. There are an increased number of prominent but nonenlarged  retroperitoneal para-aortic lymph nodes. No enlarged pelvic lymph nodes.   BOWEL/MESENTERY/PERITONEUM: The stomach appears within normal limits for degree of distention. There is a small diverticulum arising from the proximal transverse duodenum. There is also moderate colonic diverticulosis most pronounced within the sigmoid colon. No inflammatory bowel wall thickening or dilatation. Normal appendix.   No ascites, free air, or fluid collection.     MUSCULOSKELETAL: No acute osseous abnormality. No suspicious osseous lesion. Grade 1 anterolisthesis of L4 on L5. Severe L3-L4 disc height loss, moderate L4-L5 disc height loss.       Severe bladder dilatation due to urinary retention, measuring 22.6 x 14.3 x 18 x 4 cm resulting in upstream bilateral moderate hydroureteronephrosis. There is perivesical fluid/stranding which could be due to any combination of severe wall tension, cystitis, or bladder perforation in order of likelihood. Recommend correlation with urinalysis. Urological consultation/follow-up is recommended.   Enlarged prostate gland that may be responsible for the aforementioned causing bladder outlet obstruction. Benign prostate hyperplasia without superimposed carcinoma is the differential diagnosis. Urological follow-up is recommended.     MACRO: None.   Signed by: Leland Rodas 11/18/2024 11:41 PM Dictation workstation:   [id number]      Assessment  MARILIA  Post surgical hematoma    On exam abdomen soft and non distended.  Bowel sound present.  Wilson catheter in place.  Red urine noted in wilson drainage bag.  Respiration equal and unlabored.  Lung sounds clear.  Morning lab work shows no leukocytosis and hemoglobin 10.8 down from 12.2. Afebrile.       Plan  -Diet - Regular 2-3 gram sodium  -Wilson catheter for acute obstruction  -Start Flomax  -Restart home medications  -Discontinue IV fluids  -Consult Urology  -Pain control  -Nausea control  -DVT prophylaxis- SCD  -Pulmonary toileting - cough and  deep breathing  -Encourage ambulation      Further recommendations per Dr. Cassidy Mclean, APRN-CNP    Time spent  37  minutes obtaining labs, imaging, recommendations, interview, assessment, examination, medication review/ordering, and EMR review.    Plan of care was discussed extensively with patient. Patient verbalized understanding through teach back method. All questions and concerns addressed upon examination.     Of note, this documentation is completed using the Dragon Dictation system (voice recognition software). There may be spelling and/or grammatical errors that were not corrected prior to final submission.

## 2024-11-19 NOTE — H&P
General Surgery History and Physical  Patient: Thomas Lay  Unit/Bed: [id number]/[id number]  YOB: 1955  MRN: [id number]  Acct: [id number]   Admitting Diagnosis: No admission diagnoses are documented for this encounter.  Date:  11/18/2024  Hospital Day: 0  Attending: Ramin Fontaine MD       Complaint:  Chief Complaint   Patient presents with    Post-op Problem     Dr Benjamin sent him, had a hernia repair 3 weeks ago, but today the surgeon thought he has a hematoma, so he wants him to have a STAT CT with contrast - per wife        History of Present Illness:  69-year-old male who presented to the [place] ED after postop visit with Dr. Benjamin today.  Per notes and discussion with Dr. Benjamin, the patient had a repair of a large left inguinal hernia with mesh on 10/29/2024.  The patient has a large suprapubic mass concerning for postoperative hematoma.  BMP performed in the office revealed MARILIA with creatinine of 3.  Patient was sent to ED where BMP revealed MARILIA (creatinine at 2.85: Baseline 1.01-1.15). Hemoglobin/hematocrit 12.2/37.6 (baseline 13.5-15.3/41.9-46.5).  The patient's INR in the ED was 1.3 with pro time 15.2.  Patient takes Eliquis for A-fib and his last dose was this morning.  He also took his aspirin at noon today.  Patient was also sent to ED to undergo CT AP without contrast in light of MARILIA.  The patient states that he is not currently having any pain and denies abdominal tenderness.  He denies current nausea or vomiting, constipation or diarrhea.  States he has been taking Dulcolax to help move his bowels.    In the ED, aggressive fluid hydration was started with 2 L bolus and CTAP without IV contrast which showed severe bladder dilation due to urinary retention measuring 22.6 x 14 0.3 x 18 x 4 cm with upstream bilateral moderate hydroureteronephrosis and large prostate which may be responsible for after mentioned causing bladder outlet obstruction. CMP showed sodium of 140, potassium  4.1, BUN 36, GFR 23.  Allergies:  Allergies   Allergen Reactions    Adhesive Tape-Silicones Unknown    Percocet [Oxycodone-Acetaminophen] Unknown        PMHx:  Past Medical History:   Diagnosis Date    Abnormal ECG     Abnormal findings on diagnostic imaging of heart and coronary circulation     Elevated coronary artery calcium score    Arrhythmia     Atrial fibrillation (Multi)     Benign prostatic hyperplasia without lower urinary tract symptoms     Enlarged prostate without lower urinary tract symptoms (luts)    Cardiomyopathy     Diverticulosis     Encounter for general adult medical examination without abnormal findings     Health care maintenance    Heart disease     Hypertension     Irregular heart beat     Overweight 2022    Overweight    Personal history of other infectious and parasitic diseases     History of herpes simplex infection    Vision loss     left eye- detached retina    Visual impairment     Wears glasses        PSHx:  Past Surgical History:   Procedure Laterality Date    BACK SURGERY      CARPAL TUNNEL RELEASE      CERVICAL FUSION      CIRCUMCISION, PRIMARY      COLONOSCOPY      Dr Andersen     EYE SURGERY      HERNIA REPAIR      TONSILLECTOMY  1967    VASECTOMY  1985       Social Hx:  Social History     Socioeconomic History    Marital status:    Tobacco Use    Smoking status: Former     Current packs/day: 0.00     Average packs/day: 1 pack/day for 27.7 years (27.7 ttl pk-yrs)     Types: Cigarettes     Start date: 1970     Quit date: 1997     Years since quittin.1     Passive exposure: Past    Smokeless tobacco: Never   Vaping Use    Vaping status: Never Used   Substance and Sexual Activity    Alcohol use: Yes     Alcohol/week: 4.0 standard drinks of alcohol     Types: 4 Shots of liquor per week    Drug use: Yes     Frequency: 1.0 times per week     Types: Marijuana    Sexual activity: Defer     Partners: Female     Birth control/protection: Male  Sterilization       Family Hx:  Family History   Problem Relation Name Age of Onset    Ulcerative colitis Mother Renee HOPSON Sullenberger     Colon cancer Mother Renee HOPSON Sullenberger     Cancer Mother Renee Gradylenjennifer     Colon cancer Father Ulysses G Sullenberger     Pancreatic cancer Father Ulysses G Sulryley     Cancer Father Ulysses G Sullenberger     Atrial fibrillation Brother Butch Lay     Ulcerative colitis Brother Butch Lay     Cancer Brother Butch Lay        Review of Systems:   Review of Systems   Gastrointestinal:  Positive for abdominal distention and abdominal pain.   All other systems reviewed and are negative.      Physical Examination:    Visit Vitals  /72 (BP Location: Right arm, Patient Position: Sitting)   Pulse 65   Temp 36.6 °C (97.9 °F) (Temporal)   Resp 17      Physical Exam  Vitals reviewed.   Constitutional:       General: He is not in acute distress.     Appearance: Normal appearance. He is not ill-appearing.   HENT:      Head: Normocephalic and atraumatic.      Mouth/Throat:      Mouth: Mucous membranes are dry.   Cardiovascular:      Rate and Rhythm: Normal rate.   Pulmonary:      Effort: Pulmonary effort is normal.   Abdominal:      General: There is distension.      Palpations: Abdomen is soft. There is mass.      Tenderness: There is no abdominal tenderness. There is no guarding or rebound.          Comments: Mass suspected to be hematoma between the umbilicus and the suprapubic region with healing laparoscopy sites.   Musculoskeletal:         General: Normal range of motion.   Skin:     General: Skin is warm and dry.   Neurological:      Mental Status: He is alert and oriented to person, place, and time.   Psychiatric:         Mood and Affect: Mood normal.         Behavior: Behavior normal.         Thought Content: Thought content normal.      Comments: Wife at bedside         LABS:  CBC:   Lab Results   Component Value Date    WBC 8.6  "11/18/2024    RBC 4.14 (L) 11/18/2024    HGB 12.2 (L) 11/18/2024    HCT 37.6 (L) 11/18/2024    MCV 91 11/18/2024    MCH 29.5 11/18/2024    MCHC 32.4 11/18/2024    RDW 12.4 11/18/2024     (H) 11/18/2024     CBC with Differential:    Lab Results   Component Value Date    WBC 8.6 11/18/2024    RBC 4.14 (L) 11/18/2024    HGB 12.2 (L) 11/18/2024    HCT 37.6 (L) 11/18/2024     (H) 11/18/2024    MCV 91 11/18/2024    MCH 29.5 11/18/2024    MCHC 32.4 11/18/2024    RDW 12.4 11/18/2024    NRBC 0.0 11/18/2024    LYMPHOPCT 16.8 11/18/2024    MONOPCT 10.6 11/18/2024    EOSPCT 2.6 11/18/2024    BASOPCT 0.8 11/18/2024    MONOSABS 0.91 11/18/2024    LYMPHSABS 1.44 11/18/2024    EOSABS 0.22 11/18/2024    BASOSABS 0.07 11/18/2024     CMP:    Lab Results   Component Value Date     11/18/2024    K 4.1 11/18/2024     11/18/2024    CO2 24 11/18/2024    BUN 36 (H) 11/18/2024    CREATININE 2.85 (H) 11/18/2024    GLUCOSE 89 11/18/2024    PROT 7.3 11/18/2024    CALCIUM 9.2 11/18/2024    BILITOT 0.6 11/18/2024    ALKPHOS 77 11/18/2024    AST 19 11/18/2024    ALT 22 11/18/2024     BMP:    Lab Results   Component Value Date     11/18/2024    K 4.1 11/18/2024     11/18/2024    CO2 24 11/18/2024    BUN 36 (H) 11/18/2024    CREATININE 2.85 (H) 11/18/2024    CALCIUM 9.2 11/18/2024    GLUCOSE 89 11/18/2024     Magnesium:No results found for: \"MG\"  Troponin:    Lab Results   Component Value Date    TROPHS 6 11/18/2024     Lipid Panel:  No results found for: \"HDL\", \"CHHDL\", \"VLDL\", \"TRIG\", \"NHDL\"     Current Medications:  No current facility-administered medications for this encounter.    Current Outpatient Medications:     amLODIPine (Norvasc) 10 mg tablet, Take 1 tablet (10 mg) by mouth once daily., Disp: 90 tablet, Rfl: 1    aspirin 81 mg EC tablet, Take 1 tablet (81 mg) by mouth once daily., Disp: , Rfl:     cholecalciferol (Vitamin D-3) 125 MCG (5000 UT) capsule, Take 1 capsule (125 mcg) by mouth once daily., " Disp: , Rfl:     docusate sodium (Colace) 100 mg capsule, Take 1 capsule (100 mg) by mouth 2 times a day., Disp: 20 capsule, Rfl: 1    Eliquis 5 mg tablet, TAKE 1 TABLET BY MOUTH TWICE DAILY, Disp: 180 tablet, Rfl: 3    HYDROcodone-acetaminophen (Norco) 5-325 mg tablet, Take 1 tablet by mouth every 6 hours if needed for severe pain (7 - 10)., Disp: 20 tablet, Rfl: 0    losartan (Cozaar) 25 mg tablet, Take 1 tablet (25 mg) by mouth once daily., Disp: 90 tablet, Rfl: 3    lovastatin (Mevacor) 10 mg tablet, Take 1 tablet (10 mg) by mouth once daily., Disp: 90 tablet, Rfl: 1    magnesium oxide (Mag-Ox) 250 mg magnesium tablet, Take 1 tablet (250 mg) by mouth once daily., Disp: , Rfl:     metoprolol succinate XL (Toprol-XL) 50 mg 24 hr tablet, Take 1 tablet (50 mg) by mouth once daily at bedtime., Disp: 90 tablet, Rfl: 3    nitroglycerin (Nitrostat) 0.4 mg SL tablet, Place 1 tablet (0.4 mg) under the tongue every 5 minutes if needed., Disp: , Rfl:     sildenafil (Viagra) 100 mg tablet, Take 1 tablet (100 mg) by mouth once daily as needed for erectile dysfunction., Disp: 12 tablet, Rfl: 3    sotalol (Betapace) 120 mg tablet, Take 1.5 tablets (180 mg) by mouth 2 times a day., Disp: 270 tablet, Rfl: 3    No results found.     No results found for this or any previous visit from the past 1095 days.    Assessment:    69-year-old male who presented to the [facility] ED after postsurgical visit with Dr. Benjamin.  Patient was sent for BMP which revealed MARILIA with creatinine of 3 as well as possible postoperative hematoma development.  Patient went CT AP in the ED which revealed severely distended bladder measuring 22.6 x 14 0.3 x 18 x 4 cm with resulting upstream bilateral moderate hydroureter nephrosis likely caused by enlarged prostate and bladder outlet obstruction.    Plan:    MARILIA stage II (Cr baseline 1.1-1.15) Cr 3 due to enlarged prostate gland causing hydroureteronephrosis  - Aggressive fluid resuscitation (2 L in the ED)  -  Hold home losartan  - BMP in a.m. consider another liter bolus of LR based on results  - Ionized calcium in a.m.  - Magnesium in a.m.  - Phosphorus in a.m.  - LR at 100 mL/h-reevaluate in 24 hours  - Strict I&O  - Avoid nephrotoxic agents  - Maintain Luo catheter  - Consult urology    Atrial fibrillation history  - Telemetry  - Hold home sotalol due to GFR  - Continue home metoprolol  - Continue home magnesium    Hypertension  -Continue home amlodipine    Hyperlipidemia  -Continue home lovastatin     Further recommendations per Dr. Benjamin    Time spent  60  minutes obtaining labs, imaging, recommendations, interview, assessment, examination, medication review/ordering, and EMR review.    Plan of care was discussed extensively with patient. Patient verbalized understanding through teach back method. All questions and concerns addressed upon examination.     Of note, this documentation is completed using the Dragon Dictation system (voice recognition software). There may be spelling and/or grammatical errors that were not corrected prior to final submission.    Electronically signed by JOSE MARTIN Cuevas on 11/18/2024 at 10:26 PM

## 2024-11-20 ENCOUNTER — HOME HEALTH ADMISSION (OUTPATIENT)
Dept: HOME HEALTH SERVICES | Facility: HOME HEALTH | Age: 69
End: 2024-11-20
Payer: MEDICARE

## 2024-11-20 ENCOUNTER — APPOINTMENT (OUTPATIENT)
Dept: CARDIOLOGY | Facility: HOSPITAL | Age: 69
End: 2024-11-20
Payer: MEDICARE

## 2024-11-20 ENCOUNTER — DOCUMENTATION (OUTPATIENT)
Dept: HOME HEALTH SERVICES | Facility: HOME HEALTH | Age: 69
End: 2024-11-20
Payer: MEDICARE

## 2024-11-20 VITALS
WEIGHT: 200 LBS | HEIGHT: 70 IN | TEMPERATURE: 97.2 F | DIASTOLIC BLOOD PRESSURE: 82 MMHG | OXYGEN SATURATION: 96 % | BODY MASS INDEX: 28.63 KG/M2 | HEART RATE: 105 BPM | RESPIRATION RATE: 16 BRPM | SYSTOLIC BLOOD PRESSURE: 127 MMHG

## 2024-11-20 DIAGNOSIS — I48.0 PAROXYSMAL ATRIAL FIBRILLATION (MULTI): ICD-10-CM

## 2024-11-20 DIAGNOSIS — N17.9 AKI (ACUTE KIDNEY INJURY) (CMS-HCC): ICD-10-CM

## 2024-11-20 LAB
ANION GAP SERPL CALC-SCNC: 11 MMOL/L (ref 10–20)
ATRIAL RATE: 129 BPM
ATRIAL RATE: 220 BPM
BACTERIA UR CULT: NORMAL
BUN SERPL-MCNC: 18 MG/DL (ref 6–23)
CALCIUM SERPL-MCNC: 8.3 MG/DL (ref 8.6–10.3)
CHLORIDE SERPL-SCNC: 105 MMOL/L (ref 98–107)
CO2 SERPL-SCNC: 28 MMOL/L (ref 21–32)
CREAT SERPL-MCNC: 1.59 MG/DL (ref 0.5–1.3)
EGFRCR SERPLBLD CKD-EPI 2021: 47 ML/MIN/1.73M*2
GLUCOSE SERPL-MCNC: 114 MG/DL (ref 74–99)
HOLD SPECIMEN: NORMAL
HOLD SPECIMEN: NORMAL
MAGNESIUM SERPL-MCNC: 1.83 MG/DL (ref 1.6–2.4)
POTASSIUM SERPL-SCNC: 3.5 MMOL/L (ref 3.5–5.3)
Q ONSET: 225 MS
Q ONSET: 227 MS
QRS COUNT: 18 BEATS
QRS COUNT: 21 BEATS
QRS DURATION: 90 MS
QRS DURATION: 96 MS
QT INTERVAL: 266 MS
QT INTERVAL: 322 MS
QTC CALCULATION(BAZETT): 388 MS
QTC CALCULATION(BAZETT): 437 MS
QTC FREDERICIA: 342 MS
QTC FREDERICIA: 395 MS
R AXIS: 38 DEGREES
R AXIS: 63 DEGREES
SODIUM SERPL-SCNC: 140 MMOL/L (ref 136–145)
T AXIS: 205 DEGREES
T AXIS: 255 DEGREES
T OFFSET: 360 MS
T OFFSET: 386 MS
VENTRICULAR RATE: 111 BPM
VENTRICULAR RATE: 128 BPM

## 2024-11-20 PROCEDURE — 83735 ASSAY OF MAGNESIUM: CPT

## 2024-11-20 PROCEDURE — 93005 ELECTROCARDIOGRAM TRACING: CPT

## 2024-11-20 PROCEDURE — 36415 COLL VENOUS BLD VENIPUNCTURE: CPT | Performed by: NURSE PRACTITIONER

## 2024-11-20 PROCEDURE — 2500000001 HC RX 250 WO HCPCS SELF ADMINISTERED DRUGS (ALT 637 FOR MEDICARE OP): Performed by: UROLOGY

## 2024-11-20 PROCEDURE — 2500000001 HC RX 250 WO HCPCS SELF ADMINISTERED DRUGS (ALT 637 FOR MEDICARE OP)

## 2024-11-20 PROCEDURE — 2500000004 HC RX 250 GENERAL PHARMACY W/ HCPCS (ALT 636 FOR OP/ED)

## 2024-11-20 PROCEDURE — 99232 SBSQ HOSP IP/OBS MODERATE 35: CPT | Performed by: INTERNAL MEDICINE

## 2024-11-20 PROCEDURE — 2500000002 HC RX 250 W HCPCS SELF ADMINISTERED DRUGS (ALT 637 FOR MEDICARE OP, ALT 636 FOR OP/ED): Performed by: UROLOGY

## 2024-11-20 PROCEDURE — 2500000002 HC RX 250 W HCPCS SELF ADMINISTERED DRUGS (ALT 637 FOR MEDICARE OP, ALT 636 FOR OP/ED): Performed by: NURSE PRACTITIONER

## 2024-11-20 PROCEDURE — 93010 ELECTROCARDIOGRAM REPORT: CPT | Performed by: INTERNAL MEDICINE

## 2024-11-20 PROCEDURE — 80048 BASIC METABOLIC PNL TOTAL CA: CPT | Performed by: NURSE PRACTITIONER

## 2024-11-20 RX ORDER — METOPROLOL SUCCINATE 25 MG/1
75 TABLET, EXTENDED RELEASE ORAL NIGHTLY
Qty: 90 TABLET | Refills: 0 | Status: SHIPPED | OUTPATIENT
Start: 2024-11-20 | End: 2024-12-20

## 2024-11-20 RX ORDER — FINASTERIDE 5 MG/1
5 TABLET, FILM COATED ORAL DAILY
Qty: 30 TABLET | Refills: 0 | Status: SHIPPED | OUTPATIENT
Start: 2024-11-21 | End: 2024-11-20

## 2024-11-20 RX ORDER — TAMSULOSIN HYDROCHLORIDE 0.4 MG/1
CAPSULE ORAL
Qty: 180 CAPSULE | Refills: 0 | Status: SHIPPED | OUTPATIENT
Start: 2024-11-20

## 2024-11-20 RX ORDER — FINASTERIDE 5 MG/1
TABLET, FILM COATED ORAL
Qty: 90 TABLET | Refills: 0 | Status: SHIPPED | OUTPATIENT
Start: 2024-11-20

## 2024-11-20 RX ORDER — TAMSULOSIN HYDROCHLORIDE 0.4 MG/1
0.4 CAPSULE ORAL 2 TIMES DAILY
Qty: 60 CAPSULE | Refills: 0 | Status: SHIPPED | OUTPATIENT
Start: 2024-11-20 | End: 2024-11-20

## 2024-11-20 RX ORDER — POTASSIUM CHLORIDE 20 MEQ/1
40 TABLET, EXTENDED RELEASE ORAL ONCE
Status: COMPLETED | OUTPATIENT
Start: 2024-11-20 | End: 2024-11-20

## 2024-11-20 ASSESSMENT — PAIN SCALES - GENERAL
PAINLEVEL_OUTOF10: 0 - NO PAIN
PAINLEVEL_OUTOF10: 0 - NO PAIN

## 2024-11-20 NOTE — DISCHARGE SUMMARY
Discharge Diagnosis  MARILIA (acute kidney injury) (CMS-HCC)    Issues Requiring Follow-Up  Atrial Fibrillation  Urinary Retention  Laparoscopic repair of a large left inguinal hernia with mesh    Test Results Pending At Discharge  Pending Labs       Order Current Status    PSA, total and free In process            Hospital Course  The patient is a  69-year-old male with a history of hypertension, cardiomyopathy, A-fib presents the emergency department to be evaluated for abdominal pain. Patient had a left inguinal hernia repair with mesh placement on 29 October by Dr. Benjamin. Patient states that the surgery went well however he has been having generalized swelling of the abdomen ever since. He denies constipation or diarrhea. Denies nausea or vomiting. Denies urgency frequency and dysuria. Denies blood in the urine or stool. He followed up with Dr. Benjamin as an outpatient who is concerned about the patient's worsening anemia, worsening kidney function, and abdominal swelling as this can represent a postoperative infection or hematoma. Recommended that the patient be admitted to the hospital under his service after he has basic blood work and a CT abdomen and pelvis. He originally wanted a CTA however given the patient's kidney function, contrast not recommended at this time. Patient is on Eliquis due to his history of A-fib and discontinue the Eliquis 5 days before and 5 days after surgery but he is currently taking the Eliquis again. Patient states that he has had some swelling and bruising of his abdomen ever since the surgery and states that it is slowly getting worse. Patient denies chest pain and shortness of breath. With his history of A-fib he also has a history of CHF and is on diuretics. Denies history of CAD, PE or DVT, asthma. Denies weakness and fatigue. Denies fever and chills. Denies all other systemic symptoms.  CT showed severe bladder dilatation due to urinary retention, measuring 22.6 x 14.3 x 18 x 4 cm  resulting in upstream bilateral moderate hydroureteronephrosis and enlarged prostate.  Dr. D'Amico in Urology consulted for urinary retention and enlarged prostate.  Luo catheter was placed for urinary retention.  US Prostate transrectal showed   Grade  3+ enlargement of the prostate. Patient went into A-fib, so cardiology was consulted, medications from home restarted.  Patient decided he would like to follow-up with Dr. Calle as an outpatient for further management of his atrial fibrillation, telemetry shows a fibrillation with heart rate of 80 to 160 bpm.  Urology started Flomax and finasteride.  Plan to keep Luo catheter until follow-up with urology 2 weeks after discharge.  Patient discharged home in stable condition.    Pertinent Physical Exam At Time of Discharge  Physical Exam  Vitals and nursing note reviewed.   Constitutional:       General: He is awake.      Appearance: Normal appearance. He is well-developed and overweight.   HENT:      Head: Normocephalic.      Nose: Nose normal.      Mouth/Throat:      Mouth: Mucous membranes are moist.   Cardiovascular:      Rate and Rhythm: Normal rate. Rhythm irregular.      Heart sounds: Normal heart sounds.   Pulmonary:      Effort: Pulmonary effort is normal.      Breath sounds: Normal breath sounds.   Abdominal:      General: Abdomen is flat. Bowel sounds are normal.      Palpations: Abdomen is soft.      Comments: Lap sites dry and intact with some surgical glue still present   Genitourinary:     Comments: Luo catheter remains in place  Skin:     General: Skin is warm and dry.      Capillary Refill: Capillary refill takes less than 2 seconds.   Neurological:      General: No focal deficit present.      Mental Status: He is alert and oriented to person, place, and time.   Psychiatric:         Mood and Affect: Mood normal.         Speech: Speech normal.         Behavior: Behavior is cooperative.         Home Medications     Medication List      START taking  these medications     finasteride 5 mg tablet; Commonly known as: Proscar; Take 1 tablet (5   mg) by mouth once daily. Do not crush, chew, or split.; Start taking on:   November 21, 2024   tamsulosin 0.4 mg 24 hr capsule; Commonly known as: Flomax; Take 1   capsule (0.4 mg) by mouth 2 times a day.     CHANGE how you take these medications     * metoprolol succinate XL 50 mg 24 hr tablet; Commonly known as:   Toprol-XL; Take 1 tablet (50 mg) by mouth once daily at bedtime.; What   changed: Another medication with the same name was added. Make sure you   understand how and when to take each.   * metoprolol succinate XL 25 mg 24 hr tablet; Commonly known as:   Toprol-XL; Take 3 tablets (75 mg) by mouth once daily at bedtime. Do not   crush or chew.; What changed: You were already taking a medication with   the same name, and this prescription was added. Make sure you understand   how and when to take each.  * This list has 2 medication(s) that are the same as other medications   prescribed for you. Read the directions carefully, and ask your doctor or   other care provider to review them with you.     CONTINUE taking these medications     amLODIPine 10 mg tablet; Commonly known as: Norvasc; Take 1 tablet (10   mg) by mouth once daily.   aspirin 81 mg EC tablet   cholecalciferol 125 mcg (5000 UT) capsule; Commonly known as: Vitamin   D-3   docusate sodium 100 mg capsule; Commonly known as: Colace; Take 1   capsule (100 mg) by mouth 2 times a day.   Eliquis 5 mg tablet; Generic drug: apixaban; TAKE 1 TABLET BY MOUTH   TWICE DAILY   HYDROcodone-acetaminophen 5-325 mg tablet; Commonly known as: Norco;   Take 1 tablet by mouth every 6 hours if needed for severe pain (7 - 10).   losartan 25 mg tablet; Commonly known as: Cozaar; Take 1 tablet (25 mg)   by mouth once daily.   lovastatin 10 mg tablet; Commonly known as: Mevacor; Take 1 tablet (10   mg) by mouth once daily.   magnesium oxide 250 mg magnesium tablet; Commonly  known as: Mag-Ox   nitroglycerin 0.4 mg SL tablet; Commonly known as: Nitrostat   sildenafil 100 mg tablet; Commonly known as: Viagra; Take 1 tablet (100   mg) by mouth once daily as needed for erectile dysfunction.   sotalol 120 mg tablet; Commonly known as: Betapace; Take 1.5 tablets   (180 mg) by mouth 2 times a day.       Outpatient Follow-Up  Future Appointments   Date Time Provider Department Center   11/21/2024 To Be Determined Asya Bah RN [facility]   12/18/2024  3:30 PM Mihir Calle MD [id number] [place]   1/17/2025 10:00 AM Scotty Dupree MD [id number] [place]   2/6/2025 10:00 AM [id number] [place]       Shanta Mclean, APRN-CNP  Time spent  37  minutes obtaining labs, imaging, recommendations, interview, assessment, examination, medication review/ordering, and EMR review.    Plan of care was discussed extensively with patient. Patient verbalized understanding through teach back method. All questions and concerns addressed upon examination.     Of note, this documentation is completed using the Dragon Dictation system (voice recognition software). There may be spelling and/or grammatical errors that were not corrected prior to final submission.

## 2024-11-20 NOTE — DISCHARGE INSTRUCTIONS
Please call the office to follow up in 2 weeks with Dr. Benjamin.  Please follow up wit Dr. D'amico in 2 weeks in regards to wilson catheter.     Please take the new medication as prescribed. Increase in metoprolol per EP, and Flomax, Proscar per urology.     Call office or come to ER if:  You have a fever of 100.4 Fahrenheit  You cannot tolerate food or water  Unable to urinate  New or worsening of symptoms  Chest pain or shortness of breath

## 2024-11-20 NOTE — PROGRESS NOTES
Urology progress note for Wednesday, 11/20/2024  Patient is lying in bed, comfortable, overall generally feels improved  Luo catheter draining well with yellow clear urine  Still having some element of postobstructive diuresis with significant urinary output send recommended to the patient to drink plenty of oral fluids and water to maintain appropriate intake output balance  Good improvement in acute kidney injury renal function with serum creatinine now 1.59 compared to admission serum creatinine of 3.0  Hemoglobin 10.8  Urine culture negative  Current prostate ultrasound shows a volume of 91 cc increased over previous ultrasound 2 years ago, heterogenous areas and calcifications but no reported suspicious nodules for possible malignancy  Continues on Flomax 0.4 mg twice daily and finasteride 5 mg daily  Continues on Eliquis anticoagulation for atrial fibrillation and following with cardiology and medicine  Okay for discharge planning urologically with discharge home with indwelling Luo catheter and home health care to assist patient in management  Urologically we are planning to return the patient in about 10 days to 2 weeks to the outpatient area for Luo catheter removal, diagnostic flexible cystourethroscopy, and voiding trial with further management depending on results of the voiding trial at that point  Reviewed with patient and patient's significant other via cell phone and agreeable to proceed as planned  Otherwise continue supportive care    Additional medical and historical objective data reviewed and listed below      Current Facility-Administered Medications:     amLODIPine (Norvasc) tablet 10 mg, 10 mg, oral, Daily, KLEVER Cuevas-CNP, 10 mg at 11/20/24 0822    apixaban (Eliquis) tablet 5 mg, 5 mg, oral, BID, KLEVER Knapp-CNP, 5 mg at 11/20/24 0823    aspirin EC tablet 81 mg, 81 mg, oral, Daily, KLEVER Knapp-CNP, 81 mg at 11/20/24 0821    cholecalciferol (Vitamin D-3)  tablet 2,000 Units, 2,000 Units, oral, Daily, Veronica Perez APRN-CNP, 2,000 Units at 11/20/24 0821    docusate sodium (Colace) capsule 100 mg, 100 mg, oral, BID, Veronica Perez APRN-CNP, 100 mg at 11/20/24 0823    finasteride (Proscar) tablet 5 mg, 5 mg, oral, Daily, Louis D'Amico, MD, 5 mg at 11/20/24 0822    losartan (Cozaar) tablet 25 mg, 25 mg, oral, Daily, KLEVER Knapp-CNP, 25 mg at 11/20/24 0818    magnesium oxide (Mag-Ox) tablet 200 mg, 200 mg, oral, Daily, KLEVER Cuevas-CNP, 200 mg at 11/20/24 0820    metoprolol succinate XL (Toprol-XL) 24 hr tablet 75 mg, 75 mg, oral, Nightly, KLEVER Martinez-DAVID, 75 mg at 11/19/24 2058    metoprolol tartrate (Lopressor) injection 2.5 mg, 2.5 mg, intravenous, q6h PRN, KLEVER Martinez-CNP    pravastatin (Pravachol) tablet 20 mg, 20 mg, oral, Nightly, KLEVER Cuevas-CNP, 20 mg at 11/19/24 2059    sotalol (Betapace) tablet 180 mg, 180 mg, oral, BID, KLEVER Knapp-CNP, 180 mg at 11/20/24 0819    tamsulosin (Flomax) 24 hr capsule 0.4 mg, 0.4 mg, oral, BID, Louis D'Amico, MD, 0.4 mg at 11/20/24 0821     Results for orders placed or performed during the hospital encounter of 11/18/24 (from the past 96 hours)   Coagulation Screen   Result Value Ref Range    Protime 15.2 (H) 9.8 - 12.8 seconds    INR 1.3 (H) 0.9 - 1.1    aPTT 38 27 - 38 seconds   Type and Screen   Result Value Ref Range    ABO TYPE A     Rh TYPE POS     ANTIBODY SCREEN NEG    CBC and Auto Differential   Result Value Ref Range    WBC 8.6 4.4 - 11.3 x10*3/uL    nRBC 0.0 0.0 - 0.0 /100 WBCs    RBC 4.14 (L) 4.50 - 5.90 x10*6/uL    Hemoglobin 12.2 (L) 13.5 - 17.5 g/dL    Hematocrit 37.6 (L) 41.0 - 52.0 %    MCV 91 80 - 100 fL    MCH 29.5 26.0 - 34.0 pg    MCHC 32.4 32.0 - 36.0 g/dL    RDW 12.4 11.5 - 14.5 %    Platelets 490 (H) 150 - 450 x10*3/uL    Neutrophils % 68.8 40.0 - 80.0 %    Immature Granulocytes %, Automated 0.4 0.0 - 0.9 %    Lymphocytes % 16.8 13.0 - 44.0 %    Monocytes %  10.6 2.0 - 10.0 %    Eosinophils % 2.6 0.0 - 6.0 %    Basophils % 0.8 0.0 - 2.0 %    Neutrophils Absolute 5.88 1.20 - 7.70 x10*3/uL    Immature Granulocytes Absolute, Automated 0.03 0.00 - 0.70 x10*3/uL    Lymphocytes Absolute 1.44 1.20 - 4.80 x10*3/uL    Monocytes Absolute 0.91 0.10 - 1.00 x10*3/uL    Eosinophils Absolute 0.22 0.00 - 0.70 x10*3/uL    Basophils Absolute 0.07 0.00 - 0.10 x10*3/uL   Lactate   Result Value Ref Range    Lactate 0.8 0.4 - 2.0 mmol/L   Comprehensive metabolic panel   Result Value Ref Range    Glucose 89 74 - 99 mg/dL    Sodium 140 136 - 145 mmol/L    Potassium 4.1 3.5 - 5.3 mmol/L    Chloride 107 98 - 107 mmol/L    Bicarbonate 24 21 - 32 mmol/L    Anion Gap 13 10 - 20 mmol/L    Urea Nitrogen 36 (H) 6 - 23 mg/dL    Creatinine 2.85 (H) 0.50 - 1.30 mg/dL    eGFR 23 (L) >60 mL/min/1.73m*2    Calcium 9.2 8.6 - 10.3 mg/dL    Albumin 4.0 3.4 - 5.0 g/dL    Alkaline Phosphatase 77 33 - 136 U/L    Total Protein 7.3 6.4 - 8.2 g/dL    AST 19 9 - 39 U/L    Bilirubin, Total 0.6 0.0 - 1.2 mg/dL    ALT 22 10 - 52 U/L   Troponin I, High Sensitivity, Initial   Result Value Ref Range    Troponin I, High Sensitivity 6 0 - 20 ng/L   ECG 12 lead   Result Value Ref Range    Ventricular Rate 62 BPM    Atrial Rate 62 BPM    TX Interval 156 ms    QRS Duration 88 ms    QT Interval 492 ms    QTC Calculation(Bazett) 499 ms    P Axis 50 degrees    R Axis 14 degrees    T Axis -73 degrees    QRS Count 10 beats    Q Onset 226 ms    P Onset 148 ms    P Offset 218 ms    T Offset 472 ms    QTC Fredericia 497 ms   Urinalysis with Reflex Culture and Microscopic   Result Value Ref Range    Color, Urine Light-Yellow Light-Yellow, Yellow, Dark-Yellow    Appearance, Urine Clear Clear    Specific Gravity, Urine 1.014 1.005 - 1.035    pH, Urine 5.0 5.0, 5.5, 6.0, 6.5, 7.0, 7.5, 8.0    Protein, Urine NEGATIVE NEGATIVE, 10 (TRACE), 20 (TRACE) mg/dL    Glucose, Urine Normal Normal mg/dL    Blood, Urine 0.03 (TRACE) (A) NEGATIVE     Ketones, Urine NEGATIVE NEGATIVE mg/dL    Bilirubin, Urine NEGATIVE NEGATIVE    Urobilinogen, Urine Normal Normal mg/dL    Nitrite, Urine NEGATIVE NEGATIVE    Leukocyte Esterase, Urine 75 [place]/µL (A) NEGATIVE   Extra Urine Gray Tube   Result Value Ref Range    Extra Tube Hold for add-ons.    Microscopic Only, Urine   Result Value Ref Range    WBC, Urine 11-20 (A) 1-5, NONE /HPF    RBC, Urine 1-2 NONE, 1-2, 3-5 /HPF   Urine Culture    Specimen: Clean Catch/Voided; Urine   Result Value Ref Range    Urine Culture No significant growth    Troponin, High Sensitivity, 1 Hour   Result Value Ref Range    Troponin I, High Sensitivity 6 0 - 20 ng/L   SST TOP   Result Value Ref Range    Extra Tube Hold for add-ons.    CBC   Result Value Ref Range    WBC 6.3 4.4 - 11.3 x10*3/uL    nRBC 0.0 0.0 - 0.0 /100 WBCs    RBC 3.66 (L) 4.50 - 5.90 x10*6/uL    Hemoglobin 10.8 (L) 13.5 - 17.5 g/dL    Hematocrit 33.1 (L) 41.0 - 52.0 %    MCV 90 80 - 100 fL    MCH 29.5 26.0 - 34.0 pg    MCHC 32.6 32.0 - 36.0 g/dL    RDW 12.4 11.5 - 14.5 %    Platelets 368 150 - 450 x10*3/uL   Basic metabolic panel   Result Value Ref Range    Glucose 92 74 - 99 mg/dL    Sodium 144 136 - 145 mmol/L    Potassium 4.6 3.5 - 5.3 mmol/L    Chloride 113 (H) 98 - 107 mmol/L    Bicarbonate 25 21 - 32 mmol/L    Anion Gap 11 10 - 20 mmol/L    Urea Nitrogen 30 (H) 6 - 23 mg/dL    Creatinine 2.24 (H) 0.50 - 1.30 mg/dL    eGFR 31 (L) >60 mL/min/1.73m*2    Calcium 8.7 8.6 - 10.3 mg/dL   Calcium, ionized   Result Value Ref Range    POCT Calcium, Ionized 1.18 1.1 - 1.33 mmol/L   Phosphorus   Result Value Ref Range    Phosphorus 4.3 2.5 - 4.9 mg/dL   ECG 12 Lead   Result Value Ref Range    Ventricular Rate 128 BPM    Atrial Rate 129 BPM    QRS Duration 90 ms    QT Interval 266 ms    QTC Calculation(Bazett) 388 ms    R Axis 63 degrees    T Axis 255 degrees    QRS Count 21 beats    Q Onset 227 ms    T Offset 360 ms    QTC Fredericia 342 ms   Magnesium   Result Value Ref Range     Magnesium 1.83 1.60 - 2.40 mg/dL   Basic Metabolic Panel   Result Value Ref Range    Glucose 114 (H) 74 - 99 mg/dL    Sodium 140 136 - 145 mmol/L    Potassium 3.5 3.5 - 5.3 mmol/L    Chloride 105 98 - 107 mmol/L    Bicarbonate 28 21 - 32 mmol/L    Anion Gap 11 10 - 20 mmol/L    Urea Nitrogen 18 6 - 23 mg/dL    Creatinine 1.59 (H) 0.50 - 1.30 mg/dL    eGFR 47 (L) >60 mL/min/1.73m*2    Calcium 8.3 (L) 8.6 - 10.3 mg/dL   Lavender Top   Result Value Ref Range    Extra Tube Hold for add-ons.    SST TOP   Result Value Ref Range    Extra Tube Hold for add-ons.    ECG 12 Lead   Result Value Ref Range    Ventricular Rate 111 BPM    Atrial Rate 220 BPM    QRS Duration 96 ms    QT Interval 322 ms    QTC Calculation(Bazett) 437 ms    R Axis 38 degrees    T Axis 205 degrees    QRS Count 18 beats    Q Onset 225 ms    T Offset 386 ms    QTC Fredericia 395 ms     ECG 12 Lead    Result Date: 11/20/2024  Atrial fibrillation with rapid ventricular response with premature ventricular or aberrantly conducted complexes ST & T wave abnormality, consider inferior ischemia ST & T wave abnormality, consider anterolateral ischemia Abnormal ECG When compared with ECG of 19-NOV-2024 12:24, (unconfirmed) No significant change was found Confirmed by Bryan Rubio ([id number]) on 11/20/2024 12:04:33 PM    ECG 12 Lead    Result Date: 11/20/2024  Atrial fibrillation with rapid ventricular response with premature ventricular or aberrantly conducted complexes ST & T wave abnormality, consider inferolateral ischemia Abnormal ECG When compared with ECG of 18-NOV-2024 21:30, Atrial fibrillation has replaced Sinus rhythm Vent. rate has increased BY  66 BPM Inverted T waves have replaced nonspecific T wave abnormality in Inferior leads Nonspecific T wave abnormality has replaced inverted T waves in Anterior leads T wave inversion more evident in Lateral leads Confirmed by Bryan Rubio ([id number]) on 11/20/2024 12:02:26 PM    US prostate transrectal    Result  Date: 11/20/2024  Interpreted By:  Koko Lisa, STUDY: US PROSTATE TRANSRECTAL;  11/19/2024 6:45 pm   INDICATION: Signs/Symptoms:Acute urinary retention.     COMPARISON: None.   ACCESSION NUMBER(S): [id number]   ORDERING CLINICIAN: LOUIS D'AMICO   TECHNIQUE: Real-time transrectal sonographic evaluation of the prostate was performed.   FINDINGS: There is grade  3+ enlargement of the prostate. Prostate measures 6.8 x 6.3 x 4.1 cm for a calculated volume of  91 cubic cm.   There is nonspecific heterogeneity of the prostate with small hyperechoic dystrophic calcifications as well as few small scattered hypoechoic cystic foci.         Grade  3+ enlargement of the prostate.   Nonspecific heterogeneity of the prostate which contains small dystrophic calcifications as well as few scattered small hypoechoic cystic foci.       MACRO: None.   Signed by: Koko Lisa 11/20/2024 8:15 AM Dictation workstation:   [id number]    ECG 12 lead    Result Date: 11/19/2024  Normal sinus rhythm ST & T wave abnormality, consider anterolateral ischemia Prolonged QT Abnormal ECG When compared with ECG of 07-NOV-2021 19:21, Sinus rhythm has replaced Atrial fibrillation Vent. rate has decreased BY  33 BPM T wave inversion more evident in Anterior leads See ED provider note for full interpretation and clinical correlation Confirmed by Tawny Bowen ([id number]) on 11/19/2024 11:05:28 AM    CT abdomen pelvis wo IV contrast    Result Date: 11/18/2024  Interpreted By:  Leland Rodas, STUDY: CT ABDOMEN PELVIS WO IV CONTRAST;  11/18/2024 10:54 pm   INDICATION: Signs/Symptoms:abdominal swelling.     COMPARISON: None.   ACCESSION NUMBER(S): [id number]   ORDERING CLINICIAN: LEVI MCDOWELL   TECHNIQUE: Contiguous axial images of the abdomen and pelvis were obtained without intravenous contrast. Coronal and sagittal reformatted images were reconstructed from the axial data.   FINDINGS: Note: The absence of intravenous contrast limits  evaluation of the solid organs and vasculature.   LOWER CHEST: No acute abnormality.     ABDOMEN/PELVIS:   ABDOMINAL WALL: Mild anasarca. There is fluid in the bilateral inguinal canals (right > left).   LIVER: The liver demonstrates a normal noncontrast attenuation. There is a 0.7 cm very low-density lesion in segment 2 consistent with a simple cyst.   BILE DUCTS: No significant intrahepatic or extrahepatic dilatation.   GALLBLADDER: No significant abnormality.   PANCREAS: No significant abnormality.   SPLEEN: No significant abnormality.   ADRENALS: No significant abnormality.   KIDNEYS, URETERS, BLADDER: There is a moderate bilateral hydroureteronephrosis to the level of the bladder. The urinary bladder is severely dilated due to urinary retention. The bladder measures 22.6 cm x 14.3 cm x 18.4 cm. There is perivesical fluid/stranding. The urinary bladder wall is normal in thickness for degree of distention. There is bilateral perinephric edema. There is no renal or ureteral calculus.   REPRODUCTIVE ORGANS: The prostate gland is enlarged, measuring 6.7 cm in transverse dimension.   VESSELS: Mild aortic atherosclerosis without AAA.   RETROPERITONEUM/LYMPH NODES: No acute retroperitoneal abnormality. There are an increased number of prominent but nonenlarged retroperitoneal para-aortic lymph nodes. No enlarged pelvic lymph nodes.   BOWEL/MESENTERY/PERITONEUM: The stomach appears within normal limits for degree of distention. There is a small diverticulum arising from the proximal transverse duodenum. There is also moderate colonic diverticulosis most pronounced within the sigmoid colon. No inflammatory bowel wall thickening or dilatation. Normal appendix.   No ascites, free air, or fluid collection.     MUSCULOSKELETAL: No acute osseous abnormality. No suspicious osseous lesion. Grade 1 anterolisthesis of L4 on L5. Severe L3-L4 disc height loss, moderate L4-L5 disc height loss.       Severe bladder dilatation due to  urinary retention, measuring 22.6 x 14.3 x 18 x 4 cm resulting in upstream bilateral moderate hydroureteronephrosis. There is perivesical fluid/stranding which could be due to any combination of severe wall tension, cystitis, or bladder perforation in order of likelihood. Recommend correlation with urinalysis. Urological consultation/follow-up is recommended.   Enlarged prostate gland that may be responsible for the aforementioned causing bladder outlet obstruction. Benign prostate hyperplasia without superimposed carcinoma is the differential diagnosis. Urological follow-up is recommended.     MACRO: None.   Signed by: Leland Rodas 11/18/2024 11:41 PM Dictation workstation:   [id number]

## 2024-11-20 NOTE — PROGRESS NOTES
Pd today w/ [facility]- steve. Dr D'amico confirmed he will follow for [facility]. [facility]CO placed. [facility] intake zoraida nixon processed for ar soc 11/21 to 11/22. Pt and rn were updated. Pt will discharge w/ [facility].

## 2024-11-20 NOTE — HH CARE COORDINATION
Home Care received a Referral for Nursing. We have processed the referral for a Start of Care on 11.21 or 11.22.24.     If you have any questions or concerns, please feel free to contact us at [phone]. Follow the prompts, enter your five digit zip code, and you will be directed to your care team on [facility] 1.

## 2024-11-20 NOTE — CARE PLAN
The patient's goals for the shift include      The clinical goals for the shift include remain free from injury by end of shift.

## 2024-11-20 NOTE — PROGRESS NOTES
Cardiology Progress Note  Patient: Thomas Lay  Unit/Bed: 909/909-B  YOB: 1955  MRN: [id number]  Acct: [id number]   Admitting Diagnosis:   Urinary retention [R33.9]  MARILIA (acute kidney injury) (CMS-HCC) [N17.9]  Abdominal pain, unspecified abdominal location [R10.9]  Date:  11/18/2024  Hospital Day: 1  Attending: Jose Benjamin MD   Rounding NGUYỄN/Cardiologist:  Wanda Obrien, APRN-CNP, Dr. Paula Torres    Primary Cardiologist: Dr. Mihir Calle      Complaint:  Chief Complaint   Patient presents with    Post-op Problem     Dr Benjamin sent him, had a hernia repair 3 weeks ago, but today the surgeon thought he has a hematoma, so he wants him to have a STAT CT with contrast - per wife        SUBJECTIVE    Patient would like to follow up with Dr Calle as outpatient for further management of his atrial fibrillation  Telemetry shows atrial fibrillation with HR  BPM        VITALS   Visit Vitals  /82 (BP Location: Right arm, Patient Position: Lying)   Pulse 105   Temp 36.2 °C (97.2 °F) (Temporal)   Resp 16        I/O:    Intake/Output Summary (Last 24 hours) at 11/20/2024 1007  Last data filed at 11/20/2024 0500  Gross per 24 hour   Intake --   Output 6500 ml   Net -6500 ml        Allergies:  Allergies   Allergen Reactions    Adhesive Tape-Silicones Unknown    Percocet [Oxycodone-Acetaminophen] Unknown        PHYSICAL EXAM   Physical Exam  Constitutional:       Appearance: Normal appearance.   HENT:      Head: Normocephalic.   Eyes:      Conjunctiva/sclera: Conjunctivae normal.   Cardiovascular:      Rate and Rhythm: Tachycardia present. Rhythm irregular.      Pulses: Normal pulses.      Heart sounds: Normal heart sounds.   Pulmonary:      Effort: Pulmonary effort is normal.      Breath sounds: Normal breath sounds.   Genitourinary:     Comments: Urinary catheter in place with gregory color urine  Musculoskeletal:         General: Normal range of motion.   Skin:     General: Skin is warm and dry.    Neurological:      General: No focal deficit present.      Mental Status: He is alert and oriented to person, place, and time.   Psychiatric:         Mood and Affect: Mood normal.         Behavior: Behavior normal.           LABS     Results for orders placed or performed during the hospital encounter of 11/18/24 (from the past 24 hours)   ECG 12 Lead   Result Value Ref Range    Ventricular Rate 128 BPM    Atrial Rate 129 BPM    QRS Duration 90 ms    QT Interval 266 ms    QTC Calculation(Bazett) 388 ms    R Axis 63 degrees    T Axis 255 degrees    QRS Count 21 beats    Q Onset 227 ms    T Offset 360 ms    QTC Fredericia 342 ms   Magnesium   Result Value Ref Range    Magnesium 1.83 1.60 - 2.40 mg/dL   Basic Metabolic Panel   Result Value Ref Range    Glucose 114 (H) 74 - 99 mg/dL    Sodium 140 136 - 145 mmol/L    Potassium 3.5 3.5 - 5.3 mmol/L    Chloride 105 98 - 107 mmol/L    Bicarbonate 28 21 - 32 mmol/L    Anion Gap 11 10 - 20 mmol/L    Urea Nitrogen 18 6 - 23 mg/dL    Creatinine 1.59 (H) 0.50 - 1.30 mg/dL    eGFR 47 (L) >60 mL/min/1.73m*2    Calcium 8.3 (L) 8.6 - 10.3 mg/dL   Lavender Top   Result Value Ref Range    Extra Tube Hold for add-ons.    SST TOP   Result Value Ref Range    Extra Tube Hold for add-ons.    ECG 12 Lead   Result Value Ref Range    Ventricular Rate 111 BPM    Atrial Rate 220 BPM    QRS Duration 96 ms    QT Interval 322 ms    QTC Calculation(Bazett) 437 ms    R Axis 38 degrees    T Axis 205 degrees    QRS Count 18 beats    Q Onset 225 ms    T Offset 386 ms    QTC Fredericia 395 ms        Scheduled medications  amLODIPine, 10 mg, oral, Daily  apixaban, 5 mg, oral, BID  aspirin, 81 mg, oral, Daily  cholecalciferol, 2,000 Units, oral, Daily  docusate sodium, 100 mg, oral, BID  finasteride, 5 mg, oral, Daily  losartan, 25 mg, oral, Daily  magnesium oxide, 200 mg, oral, Daily  metoprolol succinate XL, 75 mg, oral, Nightly  pravastatin, 20 mg, oral, Nightly  sotalol, 180 mg, oral,  BID  tamsulosin, 0.4 mg, oral, BID      Continuous medications     PRN medications  PRN medications: metoprolol tartrate     US prostate transrectal    Result Date: 11/20/2024  Interpreted By:  Koko Lisa, STUDY: US PROSTATE TRANSRECTAL;  11/19/2024 6:45 pm   INDICATION: Signs/Symptoms:Acute urinary retention.     COMPARISON: None.   ACCESSION NUMBER(S): [id number]   ORDERING CLINICIAN: LOUIS D'AMICO   TECHNIQUE: Real-time transrectal sonographic evaluation of the prostate was performed.   FINDINGS: There is grade  3+ enlargement of the prostate. Prostate measures 6.8 x 6.3 x 4.1 cm for a calculated volume of  91 cubic cm.   There is nonspecific heterogeneity of the prostate with small hyperechoic dystrophic calcifications as well as few small scattered hypoechoic cystic foci.         Grade  3+ enlargement of the prostate.   Nonspecific heterogeneity of the prostate which contains small dystrophic calcifications as well as few scattered small hypoechoic cystic foci.       MACRO: None.   Signed by: Koko Lisa 11/20/2024 8:15 AM Dictation workstation:   [id number]    ECG 12 Lead    Result Date: 11/20/2024  Atrial fibrillation with rapid ventricular response with premature ventricular or aberrantly conducted complexes ST & T wave abnormality, consider inferior ischemia ST & T wave abnormality, consider anterolateral ischemia Abnormal ECG When compared with ECG of 19-NOV-2024 12:24, (unconfirmed) No significant change was found    ECG 12 Lead    Result Date: 11/19/2024  Atrial fibrillation with rapid ventricular response with premature ventricular or aberrantly conducted complexes ST & T wave abnormality, consider inferolateral ischemia Abnormal ECG When compared with ECG of 18-NOV-2024 21:30, Atrial fibrillation has replaced Sinus rhythm Vent. rate has increased BY  66 BPM Inverted T waves have replaced nonspecific T wave abnormality in Inferior leads Nonspecific T wave abnormality has replaced inverted  T waves in Anterior leads T wave inversion more evident in Lateral leads    ECG 12 lead    Result Date: 11/19/2024  Normal sinus rhythm ST & T wave abnormality, consider anterolateral ischemia Prolonged QT Abnormal ECG When compared with ECG of 07-NOV-2021 19:21, Sinus rhythm has replaced Atrial fibrillation Vent. rate has decreased BY  33 BPM T wave inversion more evident in Anterior leads See ED provider note for full interpretation and clinical correlation Confirmed by Tawny Bowen ([id number]) on 11/19/2024 11:05:28 AM    CT abdomen pelvis wo IV contrast    Result Date: 11/18/2024  Interpreted By:  Leland Rodas, STUDY: CT ABDOMEN PELVIS WO IV CONTRAST;  11/18/2024 10:54 pm   INDICATION: Signs/Symptoms:abdominal swelling.     COMPARISON: None.   ACCESSION NUMBER(S): [id number]   ORDERING CLINICIAN: LEVI MCDOWELL   TECHNIQUE: Contiguous axial images of the abdomen and pelvis were obtained without intravenous contrast. Coronal and sagittal reformatted images were reconstructed from the axial data.   FINDINGS: Note: The absence of intravenous contrast limits evaluation of the solid organs and vasculature.   LOWER CHEST: No acute abnormality.     ABDOMEN/PELVIS:   ABDOMINAL WALL: Mild anasarca. There is fluid in the bilateral inguinal canals (right > left).   LIVER: The liver demonstrates a normal noncontrast attenuation. There is a 0.7 cm very low-density lesion in segment 2 consistent with a simple cyst.   BILE DUCTS: No significant intrahepatic or extrahepatic dilatation.   GALLBLADDER: No significant abnormality.   PANCREAS: No significant abnormality.   SPLEEN: No significant abnormality.   ADRENALS: No significant abnormality.   KIDNEYS, URETERS, BLADDER: There is a moderate bilateral hydroureteronephrosis to the level of the bladder. The urinary bladder is severely dilated due to urinary retention. The bladder measures 22.6 cm x 14.3 cm x 18.4 cm. There is perivesical fluid/stranding. The urinary  bladder wall is normal in thickness for degree of distention. There is bilateral perinephric edema. There is no renal or ureteral calculus.   REPRODUCTIVE ORGANS: The prostate gland is enlarged, measuring 6.7 cm in transverse dimension.   VESSELS: Mild aortic atherosclerosis without AAA.   RETROPERITONEUM/LYMPH NODES: No acute retroperitoneal abnormality. There are an increased number of prominent but nonenlarged retroperitoneal para-aortic lymph nodes. No enlarged pelvic lymph nodes.   BOWEL/MESENTERY/PERITONEUM: The stomach appears within normal limits for degree of distention. There is a small diverticulum arising from the proximal transverse duodenum. There is also moderate colonic diverticulosis most pronounced within the sigmoid colon. No inflammatory bowel wall thickening or dilatation. Normal appendix.   No ascites, free air, or fluid collection.     MUSCULOSKELETAL: No acute osseous abnormality. No suspicious osseous lesion. Grade 1 anterolisthesis of L4 on L5. Severe L3-L4 disc height loss, moderate L4-L5 disc height loss.       Severe bladder dilatation due to urinary retention, measuring 22.6 x 14.3 x 18 x 4 cm resulting in upstream bilateral moderate hydroureteronephrosis. There is perivesical fluid/stranding which could be due to any combination of severe wall tension, cystitis, or bladder perforation in order of likelihood. Recommend correlation with urinalysis. Urological consultation/follow-up is recommended.   Enlarged prostate gland that may be responsible for the aforementioned causing bladder outlet obstruction. Benign prostate hyperplasia without superimposed carcinoma is the differential diagnosis. Urological follow-up is recommended.     MACRO: None.   Signed by: Leland Rodas 11/18/2024 11:41 PM Dictation workstation:   [id number]       Encounter Date: 11/18/24   ECG 12 Lead   Result Value    Ventricular Rate 111    Atrial Rate 220    QRS Duration 96    QT Interval 322    QTC  Calculation(Bazett) 437    R Axis 38    T Axis 205    QRS Count 18    Q Onset 225    T Offset 386    QTC Fredericia 395    Narrative    Atrial fibrillation with rapid ventricular response with premature ventricular or aberrantly conducted complexes  ST & T wave abnormality, consider inferior ischemia  ST & T wave abnormality, consider anterolateral ischemia  Abnormal ECG  When compared with ECG of 19-NOV-2024 12:24, (unconfirmed)  No significant change was found        Tele Monitoring:atrial fibrillation with HR  BPM/ EKG shows atrial fibrillation with   and Qtc 437ms    Assessment     Patient Active Problem List   Diagnosis    Essential hypertension    CAD (coronary artery disease)    Cardiomyopathy    CHF (congestive heart failure), NYHA class II    Elevated glucose    Elevated PSA    Herpes simplex virus infection    Mixed hyperlipidemia    Paroxysmal atrial fibrillation (Multi)    Positive colorectal cancer screening using Cologuard test    Hyperglycemia    Sigmoid diverticulosis    Adenomatous polyp of colon    Vitamin D deficiency    [place] cardiac risk 10-20% in next 10 years    Former cigarette smoker    BMI 27.0-27.9,adult    Non-recurrent unilateral inguinal hernia without obstruction or gangrene    S/P inguinal hernia repair using synthetic patch    Hematoma    MARILIA (acute kidney injury) (CMS-HCC)    Abdominal pain, unspecified abdominal location     Clinical impression:  1.  Persistent atrial fibrillation  2.  Enlarged bladder with urinary retention and bilateral moderate hydroureteronephrosis requiring indwelling Luo catheter  3.  MARILIA improving  4.  Status post laparoscopic repair of a large left inguinal hernia with mesh placement on 10/29/2024  5.  Nonischemic cardiomyopathy with LVEF once 30% improved to 60% per echocardiogram December 13, 2021 [place] heart association functional class II stage B heart failure  6.  Hypertension  7.  Coronary artery disease  8.   Dyslipidemia    Plan:  Patient wishes to wait on BEENA/DCC and follow up with Dr Calle as outpatient  Continue metoprolol, Sotalol, and anticoagulation as ordered  Rate control for atrial fibrillation at this time  Ok to discharge home per EP service   Outpatient follow up with Dr Calle          Electronically signed by KLEVER Martinez-CNP on 11/20/2024 at 10:07 AM       I have personally reviewed the data.    In addition,  The patient feels tired today.  He wants to go home.    Patient declined to undergo BEENA CVN and ate breakfast.    Exam  VS reviewed.  Cor   Irregular, tachycardic  Lungs  No wheezing  Genl  Minimal eye contact    Personally reviewed ECG and tele    Tele  AF with -140's    Impression  Persistent AF with rapid ventricular rate  Off sotalol and anticoagulation acutely  Increase metoprolol   Continue other meds  Outpt EP followup with Dr. Calle  OK to discharge from EP standpoint today  Greater than 50% visit spent to discuss arrhythmia, treatment options, and management plan with patient. He declines any intervention for AF at this time. He will followup with Dr. Calle as outpatient.  All questions answered.

## 2024-11-21 ENCOUNTER — PATIENT OUTREACH (OUTPATIENT)
Dept: PRIMARY CARE | Facility: CLINIC | Age: 69
End: 2024-11-21
Payer: MEDICARE

## 2024-11-21 ENCOUNTER — HOME CARE VISIT (OUTPATIENT)
Dept: HOME HEALTH SERVICES | Facility: HOME HEALTH | Age: 69
End: 2024-11-21
Payer: MEDICARE

## 2024-11-21 VITALS
OXYGEN SATURATION: 99 % | WEIGHT: 200 LBS | HEIGHT: 70 IN | TEMPERATURE: 97.8 F | BODY MASS INDEX: 28.63 KG/M2 | SYSTOLIC BLOOD PRESSURE: 118 MMHG | HEART RATE: 67 BPM | DIASTOLIC BLOOD PRESSURE: 70 MMHG

## 2024-11-21 LAB
PSA FREE MFR SERPL: 14 %
PSA FREE SERPL-MCNC: 1.1 NG/ML
PSA SERPL IA-MCNC: 7.8 NG/ML (ref 0–4)

## 2024-11-21 PROCEDURE — G0299 HHS/HOSPICE OF RN EA 15 MIN: HCPCS

## 2024-11-21 ASSESSMENT — ACTIVITIES OF DAILY LIVING (ADL)
OASIS_M1830: 02
ENTERING_EXITING_HOME: SUPERVISION

## 2024-11-21 ASSESSMENT — ENCOUNTER SYMPTOMS
DENIES PAIN: 1
APPETITE LEVEL: GOOD
CHANGE IN APPETITE: UNCHANGED

## 2024-11-21 NOTE — PROGRESS NOTES
Discharge Facility:  [facility]    Discharge Diagnosis:  Discharge Diagnosis  MARILIA (acute kidney injury) (CMS-[facility])     Issues Requiring Follow-Up  Atrial Fibrillation  Urinary Retention  10/29/24 Laparoscopic repair of a large left inguinal hernia with mesh      Admission Date:  11/19/24  Discharge Date:   11/20/24    PCP Appointment Date:  Patient declined need for appt with Dr Harrison. Pt was told to follow up with Dr Benjamin, Cardio Dr Calle and Dr SHERRIE Pennington and Urology Dr Amico. Pt aware I will be sending info over to PCP office.   Specialist Appointment Date:   Plan to keep Luo catheter until follow-up with urology 2 weeks after discharge.     12/18/2024  3:30 PM Mihir Calle MD     1/17/2025 10:00 AM Scotty Dupree MD     Hospital Encounter and Summary Linked: Yes    See discharge assessment below for further details  Medications  Medications reviewed with patient/caregiver?: Yes (11/21/2024 10:56 AM)  Is the patient having any side effects they believe may be caused by any medication additions or changes?: No (11/21/2024 10:56 AM)  Does the patient have all medications ordered at discharge?: Yes (11/21/2024 10:56 AM)  Care Management Interventions: Provided patient education (11/21/2024 10:56 AM)  Prescription Comments: START taking these medications     finasteride 5 mg tablet; Commonly known as: Proscar; Take 1 tablet (5   mg) by mouth once daily. Do not crush, chew, or split.; Start taking on:   November 21, 2024   tamsulosin 0.4 mg 24 hr capsule; Commonly known as: Flomax; Take 1   capsule (0.4 mg) by mouth 2 times a day.  CHANGE:  metoprolol succinate XL 25 mg 24 hr tablet Commonly known as: Toprol-XL Take 3 tablets (75 mg) by mouth once daily at bedtime. Do not crush or chew (11/21/2024 10:56 AM)  Is the patient taking all medications as directed (includes completed medication regime)?: Yes (11/21/2024 10:56 AM)  Care Management Interventions: Provided  patient education; Notified home health (11/21/2024 10:56 AM)  Medication Comments: Homecare nurse was with pt, will review full med list.Pt confirmed he is aware of new and changed meds. (11/21/2024 10:56 AM)    Appointments  Does the patient have a primary care provider?: Yes (11/21/2024 10:56 AM)  Care Management Interventions: Advised patient to make appointment (pt declined appt- will be following up with specialists) (11/21/2024 10:56 AM)  Has the patient kept scheduled appointments due by today?: Yes (11/21/2024 10:56 AM)  Care Management Interventions: Educated on importance of keeping appointment (11/21/2024 10:56 AM)    Self Management  What is the home health agency?: [facility] [phone] (11/21/2024 10:56 AM)  Has home health visited the patient within 72 hours of discharge?: Yes (Nurse at house when I called for TCM outreach) (11/21/2024 10:56 AM)  What Durable Medical Equipment (DME) was ordered?: na (11/21/2024 10:56 AM)    Patient Teaching  Does the patient have access to their discharge instructions?: Yes (11/21/2024 10:56 AM)  Care Management Interventions: Reviewed instructions with patient; Educated on MyChart (Active mychart) (11/21/2024 10:56 AM)  What is the patient's perception of their health status since discharge?: Improving (11/21/2024 10:56 AM)  Is the patient/caregiver able to teach back the hierarchy of who to call/visit for symptoms/problems? PCP, Specialist, Home Health nurse, Urgent Care, ED, 911: Yes (11/21/2024 10:56 AM)  Patient/Caregiver Education Comments: Reviewed hospital stay and answered any questions. Patient/Caregiver denies any further discharge questions/needs at this time. (11/21/2024 10:56 AM)    Wrap Up  Wrap Up Additional Comments: Successful transition of care outreach within 2 business days of discharge. CM introduced myself and the TCM program to Thomas Lay.   CM gave my contact information and encouraged to call if needing  assistance or has any further non-emergent questions prior to my next outreach. (11/21/2024 10:56 AM)

## 2024-11-26 NOTE — PREPROCEDURE INSTRUCTIONS

## 2024-11-27 ENCOUNTER — HOME CARE VISIT (OUTPATIENT)
Dept: HOME HEALTH SERVICES | Facility: HOME HEALTH | Age: 69
End: 2024-11-27
Payer: MEDICARE

## 2024-12-04 ENCOUNTER — HOSPITAL ENCOUNTER (OUTPATIENT)
Facility: HOSPITAL | Age: 69
Setting detail: OUTPATIENT SURGERY
Discharge: HOME | End: 2024-12-04
Attending: UROLOGY | Admitting: UROLOGY
Payer: MEDICARE

## 2024-12-04 ENCOUNTER — PATIENT OUTREACH (OUTPATIENT)
Dept: PRIMARY CARE | Facility: CLINIC | Age: 69
End: 2024-12-04
Payer: MEDICARE

## 2024-12-04 VITALS
SYSTOLIC BLOOD PRESSURE: 113 MMHG | TEMPERATURE: 97.2 F | DIASTOLIC BLOOD PRESSURE: 84 MMHG | RESPIRATION RATE: 18 BRPM | BODY MASS INDEX: 25.82 KG/M2 | OXYGEN SATURATION: 99 % | HEIGHT: 70 IN | WEIGHT: 180.34 LBS | HEART RATE: 85 BPM

## 2024-12-04 PROCEDURE — 3600000008 HC OR TIME - EACH INCREMENTAL 1 MINUTE - PROCEDURE LEVEL THREE: Performed by: UROLOGY

## 2024-12-04 PROCEDURE — 7100000009 HC PHASE TWO TIME - INITIAL BASE CHARGE: Performed by: UROLOGY

## 2024-12-04 PROCEDURE — 2500000005 HC RX 250 GENERAL PHARMACY W/O HCPCS: Performed by: UROLOGY

## 2024-12-04 PROCEDURE — 7100000010 HC PHASE TWO TIME - EACH INCREMENTAL 1 MINUTE: Performed by: UROLOGY

## 2024-12-04 PROCEDURE — 3600000003 HC OR TIME - INITIAL BASE CHARGE - PROCEDURE LEVEL THREE: Performed by: UROLOGY

## 2024-12-04 RX ORDER — WATER 1 ML/ML
IRRIGANT IRRIGATION AS NEEDED
Status: DISCONTINUED | OUTPATIENT
Start: 2024-12-04 | End: 2024-12-04 | Stop reason: HOSPADM

## 2024-12-04 RX ORDER — LIDOCAINE HYDROCHLORIDE 20 MG/ML
JELLY TOPICAL AS NEEDED
Status: DISCONTINUED | OUTPATIENT
Start: 2024-12-04 | End: 2024-12-04 | Stop reason: HOSPADM

## 2024-12-04 ASSESSMENT — COLUMBIA-SUICIDE SEVERITY RATING SCALE - C-SSRS
2. HAVE YOU ACTUALLY HAD ANY THOUGHTS OF KILLING YOURSELF?: NO
1. IN THE PAST MONTH, HAVE YOU WISHED YOU WERE DEAD OR WISHED YOU COULD GO TO SLEEP AND NOT WAKE UP?: NO
6. HAVE YOU EVER DONE ANYTHING, STARTED TO DO ANYTHING, OR PREPARED TO DO ANYTHING TO END YOUR LIFE?: NO

## 2024-12-04 ASSESSMENT — PAIN - FUNCTIONAL ASSESSMENT
PAIN_FUNCTIONAL_ASSESSMENT: 0-10
PAIN_FUNCTIONAL_ASSESSMENT: 0-10

## 2024-12-04 ASSESSMENT — PAIN SCALES - GENERAL
PAINLEVEL_OUTOF10: 0 - NO PAIN
PAINLEVEL_OUTOF10: 0 - NO PAIN

## 2024-12-04 NOTE — OP NOTE
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    The patient was placed in lithotomy position, previous indwelling Luo catheter was removed and the area was prepped and draped per routine and 20 cc of 2% plain lidocaine jelly administered per urethra for local anesthesia    The 16 [place] Olympus flexible cystourethroscope unit was then utilized  The distal urethra was unremarkable  The bladder neck shows complete visual occlusion and obstruction from primarily lateral lobe hypertrophy of the prostate along with a smaller median lobe presents as well  The cystoscope was passed into the bladder for examination  Immediately evident was a very large capacity bladder very distended with folds suggesting the presence of some element of an atonic bladder as well as moderate trabeculation throughout with cellule formation consistent with bladder neck chronic obstruction  No evidence of any tumors or stones or suspicious lesions were present  Right and left ureteral orifice were unremarkable with clear  efflux    Through the cystoscope the bladder was allowed to fill to 700 cc of sterile water, the patient had minimal sensation of bladder fullness which would be consistent with a large capacity atonic bladder  We had the patient try to avoid and literally only a small dribbling spurt came out he was unable to initiate any form of reasonable stream  Therefore it was felt advisable to replace the Luo catheter and we replaced a 16 [place] coudé tip Luo catheter and drained the bladder and left to gravity drainage.  Patient tolerated the procedure well and was returned to the outpatient recovery discharge area in satisfactory condition    Follow-up    Current plan will be to leave the patient on Flomax 0.4 mg twice daily and finasteride 5 mg daily  Leave on Luo catheter drainage for about 1 month to allow the medications to hopefully take better affect  Plan to remove Luo catheter for a repeat voiding trial in 1 month and advise further, if the patient is still unable to void we will then consider referral for holmium laser enucleation of the prostate which is only available at tertiary care level locally  In addition we will plan to repeat blood work cultures PSA testing in 1 month as well, if PSA is still elevated we will order an iso-PSA for better determination and assessment for potential occult prostate malignancy as well

## 2024-12-04 NOTE — H&P
69-year-old white male recently admitted last month for acute on chronic urinary retention requiring Luo catheter drainage now being readmitted as a short stay patient for flexible cystourethroscopy under local lidocaine anesthesia and Luo removal with a voiding trial as noted below     Progress Notes     Signed     Date of Service: 11/20/2024  1:31 PM   Related encounter: ED to Hosp-Admission (Discharged) from 11/18/2024 in [facility] 9 with Jose Benjamin MD and Ramin Fontaine MD     Signed       Urology progress note for Wednesday, 11/20/2024  Patient is lying in bed, comfortable, overall generally feels improved  Luo catheter draining well with yellow clear urine  Still having some element of postobstructive diuresis with significant urinary output send recommended to the patient to drink plenty of oral fluids and water to maintain appropriate intake output balance  Good improvement in acute kidney injury renal function with serum creatinine now 1.59 compared to admission serum creatinine of 3.0  Hemoglobin 10.8  Urine culture negative  Current prostate ultrasound shows a volume of 91 cc increased over previous ultrasound 2 years ago, heterogenous areas and calcifications but no reported suspicious nodules for possible malignancy  Continues on Flomax 0.4 mg twice daily and finasteride 5 mg daily  Continues on Eliquis anticoagulation for atrial fibrillation and following with cardiology and medicine  Okay for discharge planning urologically with discharge home with indwelling Luo catheter and home health care to assist patient in management  Urologically we are planning to return the patient in about 10 days to 2 weeks to the outpatient area for Luo catheter removal, diagnostic flexible cystourethroscopy, and voiding trial with further management depending on results of the voiding trial at that point  Reviewed with patient and patient's significant other via cell phone and agreeable  to proceed as planned  Otherwise continue supportive care     Additional medical and historical objective data reviewed and listed below        Current Facility-Administered Medications:     amLODIPine (Norvasc) tablet 10 mg, 10 mg, oral, Daily, KLEVER Cuevas-CNP, 10 mg at 11/20/24 0822    apixaban (Eliquis) tablet 5 mg, 5 mg, oral, BID, KLEVER Knapp-CNP, 5 mg at 11/20/24 0823    aspirin EC tablet 81 mg, 81 mg, oral, Daily, KLEVER Knapp-CNP, 81 mg at 11/20/24 0821    cholecalciferol (Vitamin D-3) tablet 2,000 Units, 2,000 Units, oral, Daily, KLEVER Cuevas-CNP, 2,000 Units at 11/20/24 0821    docusate sodium (Colace) capsule 100 mg, 100 mg, oral, BID, KLEVER Cuevas-CNP, 100 mg at 11/20/24 0823    finasteride (Proscar) tablet 5 mg, 5 mg, oral, Daily, Louis D'Amico, MD, 5 mg at 11/20/24 0822    losartan (Cozaar) tablet 25 mg, 25 mg, oral, Daily, KLEVER Knapp-CNP, 25 mg at 11/20/24 0818    magnesium oxide (Mag-Ox) tablet 200 mg, 200 mg, oral, Daily, KLEVER Cuevas-CNP, 200 mg at 11/20/24 0820    metoprolol succinate XL (Toprol-XL) 24 hr tablet 75 mg, 75 mg, oral, Nightly, KLEVER Martinez-DAVID, 75 mg at 11/19/24 2058    metoprolol tartrate (Lopressor) injection 2.5 mg, 2.5 mg, intravenous, q6h PRN, JOSE MARTIN Martinez    pravastatin (Pravachol) tablet 20 mg, 20 mg, oral, Nightly, KLEVER Cuevas-CNP, 20 mg at 11/19/24 2059    sotalol (Betapace) tablet 180 mg, 180 mg, oral, BID, KLEVER Knapp-CNP, 180 mg at 11/20/24 0819    tamsulosin (Flomax) 24 hr capsule 0.4 mg, 0.4 mg, oral, BID, Louis D'Amico, MD, 0.4 mg at 11/20/24 0821            Results for orders placed or performed during the hospital encounter of 11/18/24 (from the past 96 hours)   Coagulation Screen   Result Value Ref Range     Protime 15.2 (H) 9.8 - 12.8 seconds     INR 1.3 (H) 0.9 - 1.1     aPTT 38 27 - 38 seconds   Type and Screen   Result Value Ref Range     ABO TYPE A       Rh TYPE POS        ANTIBODY SCREEN NEG     CBC and Auto Differential   Result Value Ref Range     WBC 8.6 4.4 - 11.3 x10*3/uL     nRBC 0.0 0.0 - 0.0 /100 WBCs     RBC 4.14 (L) 4.50 - 5.90 x10*6/uL     Hemoglobin 12.2 (L) 13.5 - 17.5 g/dL     Hematocrit 37.6 (L) 41.0 - 52.0 %     MCV 91 80 - 100 fL     MCH 29.5 26.0 - 34.0 pg     MCHC 32.4 32.0 - 36.0 g/dL     RDW 12.4 11.5 - 14.5 %     Platelets 490 (H) 150 - 450 x10*3/uL     Neutrophils % 68.8 40.0 - 80.0 %     Immature Granulocytes %, Automated 0.4 0.0 - 0.9 %     Lymphocytes % 16.8 13.0 - 44.0 %     Monocytes % 10.6 2.0 - 10.0 %     Eosinophils % 2.6 0.0 - 6.0 %     Basophils % 0.8 0.0 - 2.0 %     Neutrophils Absolute 5.88 1.20 - 7.70 x10*3/uL     Immature Granulocytes Absolute, Automated 0.03 0.00 - 0.70 x10*3/uL     Lymphocytes Absolute 1.44 1.20 - 4.80 x10*3/uL     Monocytes Absolute 0.91 0.10 - 1.00 x10*3/uL     Eosinophils Absolute 0.22 0.00 - 0.70 x10*3/uL     Basophils Absolute 0.07 0.00 - 0.10 x10*3/uL   Lactate   Result Value Ref Range     Lactate 0.8 0.4 - 2.0 mmol/L   Comprehensive metabolic panel   Result Value Ref Range     Glucose 89 74 - 99 mg/dL     Sodium 140 136 - 145 mmol/L     Potassium 4.1 3.5 - 5.3 mmol/L     Chloride 107 98 - 107 mmol/L     Bicarbonate 24 21 - 32 mmol/L     Anion Gap 13 10 - 20 mmol/L     Urea Nitrogen 36 (H) 6 - 23 mg/dL     Creatinine 2.85 (H) 0.50 - 1.30 mg/dL     eGFR 23 (L) >60 mL/min/1.73m*2     Calcium 9.2 8.6 - 10.3 mg/dL     Albumin 4.0 3.4 - 5.0 g/dL     Alkaline Phosphatase 77 33 - 136 U/L     Total Protein 7.3 6.4 - 8.2 g/dL     AST 19 9 - 39 U/L     Bilirubin, Total 0.6 0.0 - 1.2 mg/dL     ALT 22 10 - 52 U/L   Troponin I, High Sensitivity, Initial   Result Value Ref Range     Troponin I, High Sensitivity 6 0 - 20 ng/L   ECG 12 lead   Result Value Ref Range     Ventricular Rate 62 BPM     Atrial Rate 62 BPM     MI Interval 156 ms     QRS Duration 88 ms     QT Interval 492 ms     QTC Calculation(Bazett) 499 ms     P Axis 50  degrees     R Axis 14 degrees     T Axis -73 degrees     QRS Count 10 beats     Q Onset 226 ms     P Onset 148 ms     P Offset 218 ms     T Offset 472 ms     QTC Fredericia 497 ms   Urinalysis with Reflex Culture and Microscopic   Result Value Ref Range     Color, Urine Light-Yellow Light-Yellow, Yellow, Dark-Yellow     Appearance, Urine Clear Clear     Specific Gravity, Urine 1.014 1.005 - 1.035     pH, Urine 5.0 5.0, 5.5, 6.0, 6.5, 7.0, 7.5, 8.0     Protein, Urine NEGATIVE NEGATIVE, 10 (TRACE), 20 (TRACE) mg/dL     Glucose, Urine Normal Normal mg/dL     Blood, Urine 0.03 (TRACE) (A) NEGATIVE     Ketones, Urine NEGATIVE NEGATIVE mg/dL     Bilirubin, Urine NEGATIVE NEGATIVE     Urobilinogen, Urine Normal Normal mg/dL     Nitrite, Urine NEGATIVE NEGATIVE     Leukocyte Esterase, Urine 75 Lamont/µL (A) NEGATIVE   Extra Urine Gray Tube   Result Value Ref Range     Extra Tube Hold for add-ons.     Microscopic Only, Urine   Result Value Ref Range     WBC, Urine 11-20 (A) 1-5, NONE /HPF     RBC, Urine 1-2 NONE, 1-2, 3-5 /HPF   Urine Culture     Specimen: Clean Catch/Voided; Urine   Result Value Ref Range     Urine Culture No significant growth     Troponin, High Sensitivity, 1 Hour   Result Value Ref Range     Troponin I, High Sensitivity 6 0 - 20 ng/L   SST TOP   Result Value Ref Range     Extra Tube Hold for add-ons.     CBC   Result Value Ref Range     WBC 6.3 4.4 - 11.3 x10*3/uL     nRBC 0.0 0.0 - 0.0 /100 WBCs     RBC 3.66 (L) 4.50 - 5.90 x10*6/uL     Hemoglobin 10.8 (L) 13.5 - 17.5 g/dL     Hematocrit 33.1 (L) 41.0 - 52.0 %     MCV 90 80 - 100 fL     MCH 29.5 26.0 - 34.0 pg     MCHC 32.6 32.0 - 36.0 g/dL     RDW 12.4 11.5 - 14.5 %     Platelets 368 150 - 450 x10*3/uL   Basic metabolic panel   Result Value Ref Range     Glucose 92 74 - 99 mg/dL     Sodium 144 136 - 145 mmol/L     Potassium 4.6 3.5 - 5.3 mmol/L     Chloride 113 (H) 98 - 107 mmol/L     Bicarbonate 25 21 - 32 mmol/L     Anion Gap 11 10 - 20 mmol/L     Urea  Nitrogen 30 (H) 6 - 23 mg/dL     Creatinine 2.24 (H) 0.50 - 1.30 mg/dL     eGFR 31 (L) >60 mL/min/1.73m*2     Calcium 8.7 8.6 - 10.3 mg/dL   Calcium, ionized   Result Value Ref Range     POCT Calcium, Ionized 1.18 1.1 - 1.33 mmol/L   Phosphorus   Result Value Ref Range     Phosphorus 4.3 2.5 - 4.9 mg/dL   ECG 12 Lead   Result Value Ref Range     Ventricular Rate 128 BPM     Atrial Rate 129 BPM     QRS Duration 90 ms     QT Interval 266 ms     QTC Calculation(Bazett) 388 ms     R Axis 63 degrees     T Axis 255 degrees     QRS Count 21 beats     Q Onset 227 ms     T Offset 360 ms     QTC Fredericia 342 ms   Magnesium   Result Value Ref Range     Magnesium 1.83 1.60 - 2.40 mg/dL   Basic Metabolic Panel   Result Value Ref Range     Glucose 114 (H) 74 - 99 mg/dL     Sodium 140 136 - 145 mmol/L     Potassium 3.5 3.5 - 5.3 mmol/L     Chloride 105 98 - 107 mmol/L     Bicarbonate 28 21 - 32 mmol/L     Anion Gap 11 10 - 20 mmol/L     Urea Nitrogen 18 6 - 23 mg/dL     Creatinine 1.59 (H) 0.50 - 1.30 mg/dL     eGFR 47 (L) >60 mL/min/1.73m*2     Calcium 8.3 (L) 8.6 - 10.3 mg/dL   Lavender Top   Result Value Ref Range     Extra Tube Hold for add-ons.     SST TOP   Result Value Ref Range     Extra Tube Hold for add-ons.     ECG 12 Lead   Result Value Ref Range     Ventricular Rate 111 BPM     Atrial Rate 220 BPM     QRS Duration 96 ms     QT Interval 322 ms     QTC Calculation(Bazett) 437 ms     R Axis 38 degrees     T Axis 205 degrees     QRS Count 18 beats     Q Onset 225 ms     T Offset 386 ms     QTC Fredericia 395 ms      ECG 12 Lead     Result Date: 11/20/2024  Atrial fibrillation with rapid ventricular response with premature ventricular or aberrantly conducted complexes ST & T wave abnormality, consider inferior ischemia ST & T wave abnormality, consider anterolateral ischemia Abnormal ECG When compared with ECG of 19-NOV-2024 12:24, (unconfirmed) No significant change was found Confirmed by Bryan Rubio ([id number]) on  11/20/2024 12:04:33 PM     ECG 12 Lead     Result Date: 11/20/2024  Atrial fibrillation with rapid ventricular response with premature ventricular or aberrantly conducted complexes ST & T wave abnormality, consider inferolateral ischemia Abnormal ECG When compared with ECG of 18-NOV-2024 21:30, Atrial fibrillation has replaced Sinus rhythm Vent. rate has increased BY  66 BPM Inverted T waves have replaced nonspecific T wave abnormality in Inferior leads Nonspecific T wave abnormality has replaced inverted T waves in Anterior leads T wave inversion more evident in Lateral leads Confirmed by Bryan Rubio ([id number]) on 11/20/2024 12:02:26 PM     US prostate transrectal     Result Date: 11/20/2024  Interpreted By:  Koko Lisa, STUDY: US PROSTATE TRANSRECTAL;  11/19/2024 6:45 pm   INDICATION: Signs/Symptoms:Acute urinary retention.     COMPARISON: None.   ACCESSION NUMBER(S): [id number]   ORDERING CLINICIAN: LOUIS D'AMICO   TECHNIQUE: Real-time transrectal sonographic evaluation of the prostate was performed.   FINDINGS: There is grade  3+ enlargement of the prostate. Prostate measures 6.8 x 6.3 x 4.1 cm for a calculated volume of  91 cubic cm.   There is nonspecific heterogeneity of the prostate with small hyperechoic dystrophic calcifications as well as few small scattered hypoechoic cystic foci.          Grade  3+ enlargement of the prostate.   Nonspecific heterogeneity of the prostate which contains small dystrophic calcifications as well as few scattered small hypoechoic cystic foci.       MACRO: None.   Signed by: Koko Lisa 11/20/2024 8:15 AM Dictation workstation:   [id number]     ECG 12 lead     Result Date: 11/19/2024  Normal sinus rhythm ST & T wave abnormality, consider anterolateral ischemia Prolonged QT Abnormal ECG When compared with ECG of 07-NOV-2021 19:21, Sinus rhythm has replaced Atrial fibrillation Vent. rate has decreased BY  33 BPM T wave inversion more evident in Anterior leads  See ED provider note for full interpretation and clinical correlation Confirmed by Tawny Bowen ([id number]) on 11/19/2024 11:05:28 AM     CT abdomen pelvis wo IV contrast     Result Date: 11/18/2024  Interpreted By:  Leland Rodas, STUDY: CT ABDOMEN PELVIS WO IV CONTRAST;  11/18/2024 10:54 pm   INDICATION: Signs/Symptoms:abdominal swelling.     COMPARISON: None.   ACCESSION NUMBER(S): [id number]   ORDERING CLINICIAN: LEVI MCDOWELL   TECHNIQUE: Contiguous axial images of the abdomen and pelvis were obtained without intravenous contrast. Coronal and sagittal reformatted images were reconstructed from the axial data.   FINDINGS: Note: The absence of intravenous contrast limits evaluation of the solid organs and vasculature.   LOWER CHEST: No acute abnormality.     ABDOMEN/PELVIS:   ABDOMINAL WALL: Mild anasarca. There is fluid in the bilateral inguinal canals (right > left).   LIVER: The liver demonstrates a normal noncontrast attenuation. There is a 0.7 cm very low-density lesion in segment 2 consistent with a simple cyst.   BILE DUCTS: No significant intrahepatic or extrahepatic dilatation.   GALLBLADDER: No significant abnormality.   PANCREAS: No significant abnormality.   SPLEEN: No significant abnormality.   ADRENALS: No significant abnormality.   KIDNEYS, URETERS, BLADDER: There is a moderate bilateral hydroureteronephrosis to the level of the bladder. The urinary bladder is severely dilated due to urinary retention. The bladder measures 22.6 cm x 14.3 cm x 18.4 cm. There is perivesical fluid/stranding. The urinary bladder wall is normal in thickness for degree of distention. There is bilateral perinephric edema. There is no renal or ureteral calculus.   REPRODUCTIVE ORGANS: The prostate gland is enlarged, measuring 6.7 cm in transverse dimension.   VESSELS: Mild aortic atherosclerosis without AAA.   RETROPERITONEUM/LYMPH NODES: No acute retroperitoneal abnormality. There are an increased number of  prominent but nonenlarged retroperitoneal para-aortic lymph nodes. No enlarged pelvic lymph nodes.   BOWEL/MESENTERY/PERITONEUM: The stomach appears within normal limits for degree of distention. There is a small diverticulum arising from the proximal transverse duodenum. There is also moderate colonic diverticulosis most pronounced within the sigmoid colon. No inflammatory bowel wall thickening or dilatation. Normal appendix.   No ascites, free air, or fluid collection.     MUSCULOSKELETAL: No acute osseous abnormality. No suspicious osseous lesion. Grade 1 anterolisthesis of L4 on L5. Severe L3-L4 disc height loss, moderate L4-L5 disc height loss.        Severe bladder dilatation due to urinary retention, measuring 22.6 x 14.3 x 18 x 4 cm resulting in upstream bilateral moderate hydroureteronephrosis. There is perivesical fluid/stranding which could be due to any combination of severe wall tension, cystitis, or bladder perforation in order of likelihood. Recommend correlation with urinalysis. Urological consultation/follow-up is recommended.   Enlarged prostate gland that may be responsible for the aforementioned causing bladder outlet obstruction. Benign prostate hyperplasia without superimposed carcinoma is the differential diagnosis. Urological follow-up is recommended.     MACRO: None.   Signed by: Leland Rodas 11/18/2024 11:41 PM Dictation workstation:   [id number]      MOST RECENT COMPLETE HISTORY PHYSICAL REVIEW OF SYSTEMS IS LISTED BELOW FROM LAST MONTH, NO CHANGES        KLEVER Cuevas-CNP   Nurse Practitioner  General Surgery     H&P     Signed     Date of Service: 11/18/2024 10:26 PM   Related encounter: ED to Hosp-Admission (Discharged) from 11/18/2024 in [facility] 9 with Jose Benjamin MD and Ramin Fontaine MD     Signed       General Surgery History and Physical  Patient: Thomas Lay  Unit/Bed: [id number]/[id number]  YOB: 1955  MRN: [id number]  Acct:  [id number]   Admitting Diagnosis: No admission diagnoses are documented for this encounter.  Date:  11/18/2024  Hospital Day: 0  Attending: Ramin Fontaine MD       Complaint:       Chief Complaint   Patient presents with    Post-op Problem       Dr Benjamin sent him, had a hernia repair 3 weeks ago, but today the surgeon thought he has a hematoma, so he wants him to have a STAT CT with contrast - per wife         History of Present Illness:  69-year-old male who presented to the [facility] ED after postop visit with Dr. Benjamin today.  Per notes and discussion with Dr. Benjamin, the patient had a repair of a large left inguinal hernia with mesh on 10/29/2024.  The patient has a large suprapubic mass concerning for postoperative hematoma.  BMP performed in the office revealed MARILIA with creatinine of 3.  Patient was sent to ED where BMP revealed MARILIA (creatinine at 2.85: Baseline 1.01-1.15). Hemoglobin/hematocrit 12.2/37.6 (baseline 13.5-15.3/41.9-46.5).  The patient's INR in the ED was 1.3 with pro time 15.2.  Patient takes Eliquis for A-fib and his last dose was this morning.  He also took his aspirin at noon today.  Patient was also sent to ED to undergo CT AP without contrast in light of AMRILIA.  The patient states that he is not currently having any pain and denies abdominal tenderness.  He denies current nausea or vomiting, constipation or diarrhea.  States he has been taking Dulcolax to help move his bowels.     In the ED, aggressive fluid hydration was started with 2 L bolus and CTAP without IV contrast which showed severe bladder dilation due to urinary retention measuring 22.6 x 14 0.3 x 18 x 4 cm with upstream bilateral moderate hydroureteronephrosis and large prostate which may be responsible for after mentioned causing bladder outlet obstruction. CMP showed sodium of 140, potassium 4.1, BUN 36, GFR 23.  Allergies:       Allergies   Allergen Reactions    Adhesive Tape-Silicones Unknown    Percocet  [Oxycodone-Acetaminophen] Unknown         PMHx:       Past Medical History:   Diagnosis Date    Abnormal ECG      Abnormal findings on diagnostic imaging of heart and coronary circulation       Elevated coronary artery calcium score    Arrhythmia      Atrial fibrillation (Multi)      Benign prostatic hyperplasia without lower urinary tract symptoms       Enlarged prostate without lower urinary tract symptoms (luts)    Cardiomyopathy      Diverticulosis      Encounter for general adult medical examination without abnormal findings       Health care maintenance    Heart disease      Hypertension      Irregular heart beat      Overweight 2022     Overweight    Personal history of other infectious and parasitic diseases       History of herpes simplex infection    Vision loss       left eye- detached retina    Visual impairment      Wears glasses           PSHx:        Past Surgical History:   Procedure Laterality Date    BACK SURGERY       CARPAL TUNNEL RELEASE        CERVICAL FUSION        CIRCUMCISION, PRIMARY       COLONOSCOPY         Dr Andersen     EYE SURGERY        HERNIA REPAIR        TONSILLECTOMY   1967    VASECTOMY            Social Hx:  Social History            Socioeconomic History    Marital status:    Tobacco Use    Smoking status: Former       Current packs/day: 0.00       Average packs/day: 1 pack/day for 27.7 years (27.7 ttl pk-yrs)       Types: Cigarettes       Start date: 1970       Quit date: 1997       Years since quittin.1       Passive exposure: Past    Smokeless tobacco: Never   Vaping Use    Vaping status: Never Used   Substance and Sexual Activity    Alcohol use: Yes       Alcohol/week: 4.0 standard drinks of alcohol       Types: 4 Shots of liquor per week    Drug use: Yes       Frequency: 1.0 times per week       Types: Marijuana    Sexual activity: Defer       Partners: Female       Birth control/protection: Male Sterilization         Family Hx:          Family History   Problem Relation Name Age of Onset    Ulcerative colitis Mother Renee HOPSON Sullenjennifer      Colon cancer Mother Renee Gradylenberger      Cancer Mother Renee Lay      Colon cancer Father Ulysses G Sullenberger      Pancreatic cancer Father Ulysses G Sullenberger      Cancer Father Ulysses G Sullenberger      Atrial fibrillation Brother Butch Lay      Ulcerative colitis Brother Butch Lay      Cancer Brother Butch Lay           Review of Systems:   Review of Systems   Gastrointestinal:  Positive for abdominal distention and abdominal pain.   All other systems reviewed and are negative.        Physical Examination:    Visit Vitals  /72 (BP Location: Right arm, Patient Position: Sitting)   Pulse 65   Temp 36.6 °C (97.9 °F) (Temporal)   Resp 17      Physical Exam  Vitals reviewed.   Constitutional:       General: He is not in acute distress.     Appearance: Normal appearance. He is not ill-appearing.   HENT:      Head: Normocephalic and atraumatic.      Mouth/Throat:      Mouth: Mucous membranes are dry.   Cardiovascular:      Rate and Rhythm: Normal rate.   Pulmonary:      Effort: Pulmonary effort is normal.   Abdominal:      General: There is distension.      Palpations: Abdomen is soft. There is mass.      Tenderness: There is no abdominal tenderness. There is no guarding or rebound.           Comments: Mass suspected to be hematoma between the umbilicus and the suprapubic region with healing laparoscopy sites.   Musculoskeletal:         General: Normal range of motion.   Skin:     General: Skin is warm and dry.   Neurological:      Mental Status: He is alert and oriented to person, place, and time.   Psychiatric:         Mood and Affect: Mood normal.         Behavior: Behavior normal.         Thought Content: Thought content normal.      Comments: Wife at bedside            LABS:  CBC:         Lab Results   Component Value Date     WBC 8.6  "11/18/2024     RBC 4.14 (L) 11/18/2024     HGB 12.2 (L) 11/18/2024     HCT 37.6 (L) 11/18/2024     MCV 91 11/18/2024     MCH 29.5 11/18/2024     MCHC 32.4 11/18/2024     RDW 12.4 11/18/2024      (H) 11/18/2024      CBC with Differential:          Lab Results   Component Value Date     WBC 8.6 11/18/2024     RBC 4.14 (L) 11/18/2024     HGB 12.2 (L) 11/18/2024     HCT 37.6 (L) 11/18/2024      (H) 11/18/2024     MCV 91 11/18/2024     MCH 29.5 11/18/2024     MCHC 32.4 11/18/2024     RDW 12.4 11/18/2024     NRBC 0.0 11/18/2024     LYMPHOPCT 16.8 11/18/2024     MONOPCT 10.6 11/18/2024     EOSPCT 2.6 11/18/2024     BASOPCT 0.8 11/18/2024     MONOSABS 0.91 11/18/2024     LYMPHSABS 1.44 11/18/2024     EOSABS 0.22 11/18/2024     BASOSABS 0.07 11/18/2024      CMP:          Lab Results   Component Value Date      11/18/2024     K 4.1 11/18/2024      11/18/2024     CO2 24 11/18/2024     BUN 36 (H) 11/18/2024     CREATININE 2.85 (H) 11/18/2024     GLUCOSE 89 11/18/2024     PROT 7.3 11/18/2024     CALCIUM 9.2 11/18/2024     BILITOT 0.6 11/18/2024     ALKPHOS 77 11/18/2024     AST 19 11/18/2024     ALT 22 11/18/2024      BMP:          Lab Results   Component Value Date      11/18/2024     K 4.1 11/18/2024      11/18/2024     CO2 24 11/18/2024     BUN 36 (H) 11/18/2024     CREATININE 2.85 (H) 11/18/2024     CALCIUM 9.2 11/18/2024     GLUCOSE 89 11/18/2024      Magnesium:No results found for: \"MG\"  Troponin:          Lab Results   Component Value Date     TROPHS 6 11/18/2024      Lipid Panel:  No results found for: \"HDL\", \"CHHDL\", \"VLDL\", \"TRIG\", \"NHDL\"      Current Medications:  No current facility-administered medications for this encounter.     Current Outpatient Medications:     amLODIPine (Norvasc) 10 mg tablet, Take 1 tablet (10 mg) by mouth once daily., Disp: 90 tablet, Rfl: 1    aspirin 81 mg EC tablet, Take 1 tablet (81 mg) by mouth once daily., Disp: , Rfl:     cholecalciferol (Vitamin " D-3) 125 MCG (5000 UT) capsule, Take 1 capsule (125 mcg) by mouth once daily., Disp: , Rfl:     docusate sodium (Colace) 100 mg capsule, Take 1 capsule (100 mg) by mouth 2 times a day., Disp: 20 capsule, Rfl: 1    Eliquis 5 mg tablet, TAKE 1 TABLET BY MOUTH TWICE DAILY, Disp: 180 tablet, Rfl: 3    HYDROcodone-acetaminophen (Norco) 5-325 mg tablet, Take 1 tablet by mouth every 6 hours if needed for severe pain (7 - 10)., Disp: 20 tablet, Rfl: 0    losartan (Cozaar) 25 mg tablet, Take 1 tablet (25 mg) by mouth once daily., Disp: 90 tablet, Rfl: 3    lovastatin (Mevacor) 10 mg tablet, Take 1 tablet (10 mg) by mouth once daily., Disp: 90 tablet, Rfl: 1    magnesium oxide (Mag-Ox) 250 mg magnesium tablet, Take 1 tablet (250 mg) by mouth once daily., Disp: , Rfl:     metoprolol succinate XL (Toprol-XL) 50 mg 24 hr tablet, Take 1 tablet (50 mg) by mouth once daily at bedtime., Disp: 90 tablet, Rfl: 3    nitroglycerin (Nitrostat) 0.4 mg SL tablet, Place 1 tablet (0.4 mg) under the tongue every 5 minutes if needed., Disp: , Rfl:     sildenafil (Viagra) 100 mg tablet, Take 1 tablet (100 mg) by mouth once daily as needed for erectile dysfunction., Disp: 12 tablet, Rfl: 3    sotalol (Betapace) 120 mg tablet, Take 1.5 tablets (180 mg) by mouth 2 times a day., Disp: 270 tablet, Rfl: 3     No results found.      No results found for this or any previous visit from the past 1095 days.     Assessment:    69-year-old male who presented to the [facility] ED after postsurgical visit with Dr. Benjamin.  Patient was sent for BMP which revealed MARILIA with creatinine of 3 as well as possible postoperative hematoma development.  Patient went CT AP in the ED which revealed severely distended bladder measuring 22.6 x 14 0.3 x 18 x 4 cm with resulting upstream bilateral moderate hydroureter nephrosis likely caused by enlarged prostate and bladder outlet obstruction.     Plan:     MARILIA stage II (Cr baseline 1.1-1.15) Cr 3 due to enlarged prostate gland  causing hydroureteronephrosis  - Aggressive fluid resuscitation (2 L in the ED)  - Hold home losartan  - BMP in a.m. consider another liter bolus of LR based on results  - Ionized calcium in a.m.  - Magnesium in a.m.  - Phosphorus in a.m.  - LR at 100 mL/h-reevaluate in 24 hours  - Strict I&O  - Avoid nephrotoxic agents  - Maintain Luo catheter  - Consult urology     Atrial fibrillation history  - Telemetry  - Hold home sotalol due to GFR  - Continue home metoprolol  - Continue home magnesium     Hypertension  -Continue home amlodipine     Hyperlipidemia  -Continue home lovastatin     Further recommendations per Dr. Benjamin     Time spent  60  minutes obtaining labs, imaging, recommendations, interview, assessment, examination, medication review/ordering, and EMR review.     Plan of care was discussed extensively with patient. Patient verbalized understanding through teach back method. All questions and concerns addressed upon examination.      Of note, this documentation is completed using the Dragon Dictation system (voice recognition software). There may be spelling and/or grammatical errors that were not corrected prior to final submission.     Electronically signed by JOSE MARTIN Cuevas on 11/18/2024 at 10:26 PM

## 2024-12-06 ENCOUNTER — HOME CARE VISIT (OUTPATIENT)
Dept: HOME HEALTH SERVICES | Facility: HOME HEALTH | Age: 69
End: 2024-12-06
Payer: MEDICARE

## 2024-12-06 VITALS
DIASTOLIC BLOOD PRESSURE: 58 MMHG | SYSTOLIC BLOOD PRESSURE: 122 MMHG | TEMPERATURE: 97.6 F | OXYGEN SATURATION: 99 % | HEART RATE: 118 BPM | RESPIRATION RATE: 18 BRPM

## 2024-12-06 PROCEDURE — G0299 HHS/HOSPICE OF RN EA 15 MIN: HCPCS

## 2024-12-07 ASSESSMENT — ENCOUNTER SYMPTOMS
APPETITE LEVEL: GOOD
CHANGE IN APPETITE: UNCHANGED
LAST BOWEL MOVEMENT: 67180

## 2024-12-12 ENCOUNTER — HOME CARE VISIT (OUTPATIENT)
Dept: HOME HEALTH SERVICES | Facility: HOME HEALTH | Age: 69
End: 2024-12-12
Payer: MEDICARE

## 2024-12-12 VITALS
HEART RATE: 100 BPM | SYSTOLIC BLOOD PRESSURE: 126 MMHG | TEMPERATURE: 97.7 F | OXYGEN SATURATION: 99 % | RESPIRATION RATE: 18 BRPM | DIASTOLIC BLOOD PRESSURE: 74 MMHG

## 2024-12-12 PROCEDURE — G0299 HHS/HOSPICE OF RN EA 15 MIN: HCPCS

## 2024-12-12 SDOH — ECONOMIC STABILITY: GENERAL

## 2024-12-12 ASSESSMENT — PAIN SCALES - PAIN ASSESSMENT IN ADVANCED DEMENTIA (PAINAD)
FACIALEXPRESSION: 0 - SMILING OR INEXPRESSIVE.
BREATHING: 0
FACIALEXPRESSION: 0
TOTALSCORE: 0
BODYLANGUAGE: 0 - RELAXED.
CONSOLABILITY: 0 - NO NEED TO CONSOLE.
NEGVOCALIZATION: 0 - NONE.
CONSOLABILITY: 0
BODYLANGUAGE: 0
NEGVOCALIZATION: 0

## 2024-12-12 ASSESSMENT — ACTIVITIES OF DAILY LIVING (ADL)
AMBULATION ASSISTANCE: INDEPENDENT
AMBULATION ASSISTANCE: 1
MONEY MANAGEMENT (EXPENSES/BILLS): INDEPENDENT

## 2024-12-12 ASSESSMENT — ENCOUNTER SYMPTOMS
CHANGE IN APPETITE: UNCHANGED
APPETITE LEVEL: GOOD
PERSON REPORTING PAIN: PATIENT
SUBJECTIVE PAIN PROGRESSION: UNCHANGED
DENIES PAIN: 1
OCCASIONAL FEELINGS OF UNSTEADINESS: 0

## 2024-12-18 ENCOUNTER — APPOINTMENT (OUTPATIENT)
Dept: CARDIOLOGY | Facility: CLINIC | Age: 69
End: 2024-12-18
Payer: MEDICARE

## 2024-12-18 DIAGNOSIS — I48.0 PAROXYSMAL ATRIAL FIBRILLATION (MULTI): ICD-10-CM

## 2024-12-18 DIAGNOSIS — N17.9 AKI (ACUTE KIDNEY INJURY) (CMS-HCC): ICD-10-CM

## 2024-12-18 RX ORDER — METOPROLOL SUCCINATE 25 MG/1
TABLET, EXTENDED RELEASE ORAL
Qty: 90 TABLET | Refills: 0 | OUTPATIENT
Start: 2024-12-18

## 2024-12-28 DIAGNOSIS — I48.0 PAROXYSMAL ATRIAL FIBRILLATION (MULTI): ICD-10-CM

## 2024-12-30 RX ORDER — SOTALOL HYDROCHLORIDE 120 MG/1
TABLET ORAL
Qty: 270 TABLET | Refills: 3 | Status: SHIPPED | OUTPATIENT
Start: 2024-12-30

## 2024-12-30 NOTE — TELEPHONE ENCOUNTER
Received request for prescription refills for patient.   Patient follows with Dr. Calle    Request is for Sotalol  Is patient currently on medication yes    Last OV 4/3/24  Next OV 2/3/25    Pended for signing and sent to provider

## 2025-01-03 ENCOUNTER — HOME CARE VISIT (OUTPATIENT)
Dept: HOME HEALTH SERVICES | Facility: HOME HEALTH | Age: 70
End: 2025-01-03
Payer: MEDICARE

## 2025-01-03 VITALS
DIASTOLIC BLOOD PRESSURE: 93 MMHG | HEART RATE: 71 BPM | OXYGEN SATURATION: 98 % | TEMPERATURE: 97.7 F | SYSTOLIC BLOOD PRESSURE: 149 MMHG

## 2025-01-03 PROCEDURE — G0299 HHS/HOSPICE OF RN EA 15 MIN: HCPCS

## 2025-01-04 ENCOUNTER — HOME CARE VISIT (OUTPATIENT)
Dept: HOME HEALTH SERVICES | Facility: HOME HEALTH | Age: 70
End: 2025-01-04
Payer: MEDICARE

## 2025-01-04 VITALS
OXYGEN SATURATION: 97 % | RESPIRATION RATE: 18 BRPM | HEART RATE: 71 BPM | SYSTOLIC BLOOD PRESSURE: 146 MMHG | DIASTOLIC BLOOD PRESSURE: 89 MMHG | TEMPERATURE: 98.1 F

## 2025-01-04 PROCEDURE — G0299 HHS/HOSPICE OF RN EA 15 MIN: HCPCS

## 2025-01-04 ASSESSMENT — ENCOUNTER SYMPTOMS
APPETITE LEVEL: GOOD
APPETITE LEVEL: GOOD
PERSON REPORTING PAIN: PATIENT
LAST BOWEL MOVEMENT: 67208
DENIES PAIN: 1
CHANGE IN APPETITE: UNCHANGED
CHANGE IN APPETITE: UNCHANGED
PERSON REPORTING PAIN: PATIENT
DENIES PAIN: 1

## 2025-01-16 DIAGNOSIS — E78.2 MIXED HYPERLIPIDEMIA: ICD-10-CM

## 2025-01-17 ENCOUNTER — APPOINTMENT (OUTPATIENT)
Dept: CARDIOLOGY | Facility: CLINIC | Age: 70
End: 2025-01-17
Payer: MEDICARE

## 2025-01-17 VITALS
SYSTOLIC BLOOD PRESSURE: 138 MMHG | BODY MASS INDEX: 27.83 KG/M2 | HEART RATE: 60 BPM | WEIGHT: 194.4 LBS | DIASTOLIC BLOOD PRESSURE: 88 MMHG | HEIGHT: 70 IN

## 2025-01-17 DIAGNOSIS — E78.2 MIXED HYPERLIPIDEMIA: ICD-10-CM

## 2025-01-17 DIAGNOSIS — I42.0 DILATED CARDIOMYOPATHY (MULTI): ICD-10-CM

## 2025-01-17 DIAGNOSIS — Z87.891 FORMER CIGARETTE SMOKER: ICD-10-CM

## 2025-01-17 DIAGNOSIS — I10 ESSENTIAL HYPERTENSION: ICD-10-CM

## 2025-01-17 DIAGNOSIS — I48.0 PAROXYSMAL ATRIAL FIBRILLATION (MULTI): ICD-10-CM

## 2025-01-17 DIAGNOSIS — I25.10 CORONARY ARTERY DISEASE INVOLVING NATIVE CORONARY ARTERY OF NATIVE HEART WITHOUT ANGINA PECTORIS: ICD-10-CM

## 2025-01-17 PROCEDURE — 3079F DIAST BP 80-89 MM HG: CPT | Performed by: INTERNAL MEDICINE

## 2025-01-17 PROCEDURE — 3008F BODY MASS INDEX DOCD: CPT | Performed by: INTERNAL MEDICINE

## 2025-01-17 PROCEDURE — 1159F MED LIST DOCD IN RCRD: CPT | Performed by: INTERNAL MEDICINE

## 2025-01-17 PROCEDURE — 99214 OFFICE O/P EST MOD 30 MIN: CPT | Performed by: INTERNAL MEDICINE

## 2025-01-17 PROCEDURE — 1123F ACP DISCUSS/DSCN MKR DOCD: CPT | Performed by: INTERNAL MEDICINE

## 2025-01-17 PROCEDURE — 1036F TOBACCO NON-USER: CPT | Performed by: INTERNAL MEDICINE

## 2025-01-17 PROCEDURE — 3075F SYST BP GE 130 - 139MM HG: CPT | Performed by: INTERNAL MEDICINE

## 2025-01-17 NOTE — PATIENT INSTRUCTIONS
Continue same medications and treatments.   Patient educated on proper medication use.   Patient educated on risk factor modification.   Please bring any lab results from other providers / physicians to your next appointment.     Please bring all medicines, vitamins, and herbal supplements with you when you come to the office.     Prescriptions will not be filled unless you are compliant with your follow up appointments or have a follow up appointment scheduled as per instruction of your physician. Refills should be requested at the time of your visit.    FOLLOW UP IN 1 YEAR    I, Ivanna Rueda LPN, am scribing for and in the presence of Dr. Scotty Dupree MD, FACC

## 2025-01-17 NOTE — PROGRESS NOTES
CARDIOLOGY OFFICE VISIT      CHIEF COMPLAINT      HISTORY OF PRESENT ILLNESS  The patient states he has been doing well.  He states she still has indwelling Luo catheter in his bladder.  He denies chest discomfort or symptoms of myocardial ischemia.  He denies any Setoprime with dyspnea.  He denies pretibial edema.  He states that on occasion his atrial fibrillation he believes for short periods of time but nothing severe or prolonged or bothersome to him.  He denies any problems medication.  He is not having a problem with bleeding.  I did go over his most recent lab work with him from July and from November.  His GFR is increased to 47 on 11/20/2024.  His HDL cholesterol 58 and LDL cholesterol 77 July 2024.      Impression:  Paroxysmal atrial fibrillation  Coronary artery disease  Hypertension  Hyperlipidemia  Overweight  Dilated cardiomyopathy, left ventricular ejection fraction 30 to 35% on echocardiogram October 2021, which has subsequent resolved with normal left ventricular systolic function on echocardiogram 12/13/2021, thought to be secondary to atrial fibrillation     Please excuse any errors in grammar or translation related to this dictation. Voice recognition software was utilized to prepare this document.     Past Medical History  Past Medical History:   Diagnosis Date    Abnormal ECG     Abnormal findings on diagnostic imaging of heart and coronary circulation     Elevated coronary artery calcium score    Acute kidney injury (CMS-HCC) 11/2024    with hydronephrosis    Arrhythmia     Atrial fibrillation (Multi)     Benign prostatic hyperplasia without lower urinary tract symptoms     Enlarged prostate without lower urinary tract symptoms (luts)    Cardiomyopathy     Diverticulosis     Encounter for general adult medical examination without abnormal findings     Health care maintenance    Luo catheter in place 11/2024    Heart disease     Hypertension     Irregular heart beat     Overweight  2022    Overweight    Personal history of other infectious and parasitic diseases     History of herpes simplex infection    Vision loss     left eye- detached retina    Visual impairment     Wears glasses        Social History  Social History     Tobacco Use    Smoking status: Former     Current packs/day: 0.00     Average packs/day: 1 pack/day for 27.7 years (27.7 ttl pk-yrs)     Types: Cigarettes     Start date: 1970     Quit date: 1997     Years since quittin.3     Passive exposure: Past    Smokeless tobacco: Never   Vaping Use    Vaping status: Never Used   Substance Use Topics    Alcohol use: Not Currently     Alcohol/week: 1.0 standard drink of alcohol     Types: 1 Standard drinks or equivalent per week     Comment: 1 DRINK A WEEK    Drug use: Yes     Frequency: 1.0 times per week     Types: Marijuana       Family History     Family History   Problem Relation Name Age of Onset    Ulcerative colitis Mother Renee Lay     Colon cancer Mother Renee Coreasberger     Cancer Mother Renee Lay     Colon cancer Father Ulysses G Sullenberger     Pancreatic cancer Father Ulysses G Sullenberger     Cancer Father Ulysses G Sullenberger     Atrial fibrillation Brother Butch Lay     Ulcerative colitis Brother Butch Lay     Cancer Brother Butch Lay         Allergies:  Allergies   Allergen Reactions    Adhesive Tape-Silicones Unknown    Percocet [Oxycodone-Acetaminophen] Unknown        Outpatient Medications:  Current Outpatient Medications   Medication Instructions    amLODIPine (NORVASC) 10 mg, oral, Daily    aspirin 81 mg EC tablet 1 tablet, Daily    cholecalciferol (VITAMIN D-3) 125 mcg, Daily    docusate sodium (COLACE) 100 mg, oral, 2 times daily    Eliquis 5 mg, oral, 2 times daily    finasteride (Proscar) 5 mg tablet TAKE 1 TABLET(5 MG) BY MOUTH DAILY. DO NOT CRUSH, CHEW, OR SPLIT    HYDROcodone-acetaminophen (Norco) 5-325 mg tablet 1  tablet, oral, Every 6 hours PRN    losartan (COZAAR) 25 mg, oral, Daily    lovastatin (MEVACOR) 10 mg, oral, Daily    magnesium oxide (Mag-Ox) 250 mg magnesium tablet 1 tablet, Daily    metoprolol succinate XL (TOPROL-XL) 50 mg, oral, Nightly    metoprolol succinate XL (TOPROL-XL) 75 mg, oral, Nightly, Do not crush or chew.    nitroglycerin (NITROSTAT) 0.4 mg, Every 5 min PRN    sildenafil (VIAGRA) 100 mg, oral, Daily PRN    sotalol (Betapace) 120 mg tablet TAKE 1 AND 1/2 TABLETS(180 MG) BY MOUTH TWICE DAILY    tamsulosin (Flomax) 0.4 mg 24 hr capsule TAKE 1 CAPSULE(0.4 MG) BY MOUTH TWICE DAILY          REVIEW OF SYSTEMS  Review of Systems   All other systems reviewed and are negative.        VITALS  Vitals:    01/17/25 1008   BP: 138/88   Pulse: 60       PHYSICAL EXAM  Constitutional:       Appearance: Healthy appearance. Not in distress.   Neck:      Vascular: No JVR. JVD normal.   Pulmonary:      Effort: Pulmonary effort is normal.      Breath sounds: Normal breath sounds. No wheezing. No rhonchi. No rales.   Chest:      Chest wall: Not tender to palpatation.   Cardiovascular:      PMI at left midclavicular line. Normal rate. Regular rhythm. Normal S1. Normal S2.       Murmurs: There is no murmur.      No gallop.  No click. No rub.   Pulses:     Intact distal pulses.   Edema:     Peripheral edema absent.   Abdominal:      General: Bowel sounds are normal.      Palpations: Abdomen is soft.      Tenderness: There is no abdominal tenderness.   Musculoskeletal: Normal range of motion.         General: No tenderness. Skin:     General: Skin is warm and dry.   Neurological:      General: No focal deficit present.      Mental Status: Alert and oriented to person, place and time.           ASSESSMENT AND PLAN  Diagnoses and all orders for this visit:  Paroxysmal atrial fibrillation (Multi)  Coronary artery disease involving native coronary artery of native heart without angina pectoris  Essential hypertension  Mixed  hyperlipidemia  Dilated cardiomyopathy (Multi)  BMI 27.0-27.9,adult  Former cigarette smoker      [unfilled]

## 2025-01-18 ENCOUNTER — HOME CARE VISIT (OUTPATIENT)
Dept: HOME HEALTH SERVICES | Facility: HOME HEALTH | Age: 70
End: 2025-01-18
Payer: MEDICARE

## 2025-01-18 PROCEDURE — G0299 HHS/HOSPICE OF RN EA 15 MIN: HCPCS

## 2025-01-19 VITALS — SYSTOLIC BLOOD PRESSURE: 138 MMHG | DIASTOLIC BLOOD PRESSURE: 80 MMHG | HEART RATE: 67 BPM | RESPIRATION RATE: 18 BRPM

## 2025-01-19 ASSESSMENT — ENCOUNTER SYMPTOMS
OCCASIONAL FEELINGS OF UNSTEADINESS: 0
LOSS OF SENSATION IN FEET: 0
DEPRESSION: 0
SUBJECTIVE PAIN PROGRESSION: UNCHANGED
APPETITE LEVEL: GOOD
PERSON REPORTING PAIN: PATIENT
DENIES PAIN: 1

## 2025-01-19 ASSESSMENT — PAIN SCALES - PAIN ASSESSMENT IN ADVANCED DEMENTIA (PAINAD)
CONSOLABILITY: 0 - NO NEED TO CONSOLE.
BODYLANGUAGE: 0
NEGVOCALIZATION: 0
FACIALEXPRESSION: 0 - SMILING OR INEXPRESSIVE.
FACIALEXPRESSION: 0
NEGVOCALIZATION: 0 - NONE.
BODYLANGUAGE: 0 - RELAXED.
BREATHING: 0
TOTALSCORE: 0
CONSOLABILITY: 0

## 2025-01-19 ASSESSMENT — ACTIVITIES OF DAILY LIVING (ADL)
AMBULATION ASSISTANCE: 1
AMBULATION ASSISTANCE: INDEPENDENT
OASIS_M1830: 01
ENTERING_EXITING_HOME: MINIMUM ASSIST

## 2025-01-21 ENCOUNTER — LAB (OUTPATIENT)
Dept: LAB | Facility: LAB | Age: 70
End: 2025-01-21
Payer: MEDICARE

## 2025-01-21 DIAGNOSIS — N32.0 BLADDER-NECK OBSTRUCTION: ICD-10-CM

## 2025-01-21 DIAGNOSIS — N31.9 NEUROMUSCULAR DYSFUNCTION OF BLADDER, UNSPECIFIED: ICD-10-CM

## 2025-01-21 DIAGNOSIS — N39.0 URINARY TRACT INFECTION, SITE NOT SPECIFIED: ICD-10-CM

## 2025-01-21 DIAGNOSIS — R33.9 RETENTION OF URINE, UNSPECIFIED: Primary | ICD-10-CM

## 2025-01-21 DIAGNOSIS — N13.9 OBSTRUCTIVE AND REFLUX UROPATHY, UNSPECIFIED: ICD-10-CM

## 2025-01-21 DIAGNOSIS — N13.30 UNSPECIFIED HYDRONEPHROSIS: ICD-10-CM

## 2025-01-21 DIAGNOSIS — N40.1 BENIGN PROSTATIC HYPERPLASIA WITH LOWER URINARY TRACT SYMPTOMS: ICD-10-CM

## 2025-01-21 DIAGNOSIS — R39.16 STRAINING TO VOID: ICD-10-CM

## 2025-01-21 DIAGNOSIS — N19 UNSPECIFIED KIDNEY FAILURE: ICD-10-CM

## 2025-01-21 LAB
ANION GAP SERPL CALC-SCNC: 11 MMOL/L (ref 10–20)
BUN SERPL-MCNC: 19 MG/DL (ref 6–23)
CALCIUM SERPL-MCNC: 9.2 MG/DL (ref 8.6–10.3)
CHLORIDE SERPL-SCNC: 106 MMOL/L (ref 98–107)
CO2 SERPL-SCNC: 28 MMOL/L (ref 21–32)
CREAT SERPL-MCNC: 1.02 MG/DL (ref 0.5–1.3)
EGFRCR SERPLBLD CKD-EPI 2021: 80 ML/MIN/1.73M*2
ERYTHROCYTE [DISTWIDTH] IN BLOOD BY AUTOMATED COUNT: 13 % (ref 11.5–14.5)
GLUCOSE SERPL-MCNC: 100 MG/DL (ref 74–99)
HCT VFR BLD AUTO: 42 % (ref 41–52)
HGB BLD-MCNC: 13.9 G/DL (ref 13.5–17.5)
MCH RBC QN AUTO: 29.6 PG (ref 26–34)
MCHC RBC AUTO-ENTMCNC: 33.1 G/DL (ref 32–36)
MCV RBC AUTO: 90 FL (ref 80–100)
NRBC BLD-RTO: 0 /100 WBCS (ref 0–0)
PLATELET # BLD AUTO: 303 X10*3/UL (ref 150–450)
POTASSIUM SERPL-SCNC: 4.7 MMOL/L (ref 3.5–5.3)
PSA SERPL-MCNC: 3.3 NG/ML
RBC # BLD AUTO: 4.69 X10*6/UL (ref 4.5–5.9)
SODIUM SERPL-SCNC: 140 MMOL/L (ref 136–145)
WBC # BLD AUTO: 8.1 X10*3/UL (ref 4.4–11.3)

## 2025-01-21 PROCEDURE — 84153 ASSAY OF PSA TOTAL: CPT

## 2025-01-21 PROCEDURE — 80048 BASIC METABOLIC PNL TOTAL CA: CPT

## 2025-01-21 PROCEDURE — 85027 COMPLETE CBC AUTOMATED: CPT

## 2025-01-21 RX ORDER — LOVASTATIN 10 MG/1
10 TABLET ORAL DAILY
Qty: 90 TABLET | Refills: 1 | Status: SHIPPED | OUTPATIENT
Start: 2025-01-21

## 2025-01-22 ENCOUNTER — LAB (OUTPATIENT)
Dept: LAB | Facility: LAB | Age: 70
End: 2025-01-22
Payer: MEDICARE

## 2025-01-22 LAB
APPEARANCE UR: CLEAR
BACTERIA #/AREA URNS AUTO: ABNORMAL /HPF
BILIRUB UR STRIP.AUTO-MCNC: NEGATIVE MG/DL
COLOR UR: YELLOW
GLUCOSE UR STRIP.AUTO-MCNC: ABNORMAL MG/DL
HOLD SPECIMEN: NORMAL
KETONES UR STRIP.AUTO-MCNC: NEGATIVE MG/DL
LEUKOCYTE ESTERASE UR QL STRIP.AUTO: ABNORMAL
MUCOUS THREADS #/AREA URNS AUTO: ABNORMAL /LPF
NITRITE UR QL STRIP.AUTO: ABNORMAL
PH UR STRIP.AUTO: 6 [PH]
PROT UR STRIP.AUTO-MCNC: ABNORMAL MG/DL
RBC # UR STRIP.AUTO: NEGATIVE /UL
RBC #/AREA URNS AUTO: ABNORMAL /HPF
SP GR UR STRIP.AUTO: 1.02
UROBILINOGEN UR STRIP.AUTO-MCNC: NORMAL MG/DL
WBC #/AREA URNS AUTO: ABNORMAL /HPF

## 2025-01-22 PROCEDURE — 81001 URINALYSIS AUTO W/SCOPE: CPT

## 2025-01-22 PROCEDURE — 87086 URINE CULTURE/COLONY COUNT: CPT

## 2025-01-24 LAB
BACTERIA UR CULT: NORMAL
BACTERIA UR CULT: NORMAL

## 2025-02-03 ENCOUNTER — APPOINTMENT (OUTPATIENT)
Dept: CARDIOLOGY | Facility: CLINIC | Age: 70
End: 2025-02-03
Payer: MEDICARE

## 2025-02-03 VITALS
SYSTOLIC BLOOD PRESSURE: 132 MMHG | BODY MASS INDEX: 27.77 KG/M2 | DIASTOLIC BLOOD PRESSURE: 74 MMHG | HEART RATE: 64 BPM | HEIGHT: 70 IN | WEIGHT: 194 LBS

## 2025-02-03 DIAGNOSIS — I10 ESSENTIAL HYPERTENSION: ICD-10-CM

## 2025-02-03 DIAGNOSIS — I25.10 CORONARY ARTERY DISEASE INVOLVING NATIVE CORONARY ARTERY OF NATIVE HEART WITHOUT ANGINA PECTORIS: ICD-10-CM

## 2025-02-03 DIAGNOSIS — I42.0 DILATED CARDIOMYOPATHY (MULTI): ICD-10-CM

## 2025-02-03 DIAGNOSIS — Z51.81 ANTICOAGULATION MANAGEMENT ENCOUNTER: ICD-10-CM

## 2025-02-03 DIAGNOSIS — Z87.891 FORMER CIGARETTE SMOKER: ICD-10-CM

## 2025-02-03 DIAGNOSIS — I48.11 LONGSTANDING PERSISTENT ATRIAL FIBRILLATION (MULTI): Primary | ICD-10-CM

## 2025-02-03 DIAGNOSIS — Z79.01 ANTICOAGULATION MANAGEMENT ENCOUNTER: ICD-10-CM

## 2025-02-03 PROCEDURE — 1159F MED LIST DOCD IN RCRD: CPT | Performed by: INTERNAL MEDICINE

## 2025-02-03 PROCEDURE — 3075F SYST BP GE 130 - 139MM HG: CPT | Performed by: INTERNAL MEDICINE

## 2025-02-03 PROCEDURE — 3008F BODY MASS INDEX DOCD: CPT | Performed by: INTERNAL MEDICINE

## 2025-02-03 PROCEDURE — 3078F DIAST BP <80 MM HG: CPT | Performed by: INTERNAL MEDICINE

## 2025-02-03 PROCEDURE — 93000 ELECTROCARDIOGRAM COMPLETE: CPT | Performed by: INTERNAL MEDICINE

## 2025-02-03 PROCEDURE — 99214 OFFICE O/P EST MOD 30 MIN: CPT | Performed by: INTERNAL MEDICINE

## 2025-02-03 PROCEDURE — 1123F ACP DISCUSS/DSCN MKR DOCD: CPT | Performed by: INTERNAL MEDICINE

## 2025-02-03 RX ORDER — METOPROLOL SUCCINATE 50 MG/1
TABLET, EXTENDED RELEASE ORAL
COMMUNITY
End: 2025-02-06 | Stop reason: WASHOUT

## 2025-02-03 RX ORDER — LANOLIN ALCOHOL/MO/W.PET/CERES
400 CREAM (GRAM) TOPICAL DAILY
Qty: 180 TABLET | Refills: 3 | Status: SHIPPED | OUTPATIENT
Start: 2025-02-03 | End: 2027-01-24

## 2025-02-03 NOTE — PATIENT INSTRUCTIONS
Follow up 3  months    Increase Magnesium oxide to 400 mg 1 tablet twice daily    EKG to be done 1 week    DID YOU KNOW  We have a pharmacy here in the [facility].  They can fill all prescriptions, not just cardiac medications.  Prescriptions from other pharmacies can easily be transferred to the  pharmacy by the  pharmacist on site.   pharmacies offer FREE HOME DELIVERY on medications to anywhere in [place]. They can sync your medications. Typically prescriptions can be ready in 10 - 15 minutes. If pharmacy is unable to fill your  prescription or if cost is more than your paying now the Pharmacist can easily transfer back to your Pharmacy of choice. Pharmacy phone # [phone].     Please bring all medicines, vitamins, and herbal supplements with you in original bottles to every appointment!!!!    Prescriptions will not be filled unless you are compliant with your follow up appointments or have a follow up appointment scheduled as per instruction of your physician. Refills should be requested at the time of your visit.

## 2025-02-03 NOTE — PROGRESS NOTES
CARDIOLOGY OFFICE VISIT      CHIEF COMPLAINT  Chief Complaint   Patient presents with    Follow-up       HISTORY OF PRESENT ILLNESS  HPI  69-year-old  male who is followed for nonischemic cardiomyopathy with a left ventricular ejection fraction was reduced to 30%, improved to 60% per 2D echocardiogram dated December 13, 2021, New York Heart Association class II, stage B heart failure, persistent atrial fibrillation with periods of rapid ventricular response on sotalol, magnesium oxide, and beta-blockade and anticoagulated with Eliquis. Review of the medical records reveals patient was seen  on April 3, 2024 at which time the sotalol dose was increased from 120 mg twice a day to 180 mg twice a day for complaints of palpitations occurring 1 time per week.    Patient was admitted to [facility] in November 2024.  He presents the emergency department to be evaluated for abdominal pain. Patient had a left inguinal hernia repair with mesh placement on 29 October by Dr. Benjamin. Patient states that the surgery went well however he has been having generalized swelling of the abdomen ever since. He denies constipation or diarrhea. Denies nausea or vomiting. Denies urgency frequency and dysuria. Denies blood in the urine or stool. He followed up with Dr. Benjamin as an outpatient who is concerned about the patient's worsening anemia, worsening kidney function, and abdominal swelling as this can represent a postoperative infection or hematoma. Recommended that the patient be admitted to the hospital under his service after he has basic blood work and a CT abdomen and pelvis. He originally wanted a CTA however given the patient's kidney function, contrast not recommended at this time. Patient is on Eliquis due to his history of A-fib and discontinue the Eliquis 5 days before and 5 days after surgery but he is currently taking the Eliquis again. Patient states that he has had some swelling and bruising of his  abdomen ever since the surgery and states that it is slowly getting worse. Patient denies chest pain and shortness of breath. With his history of A-fib he also has a history of CHF and is on diuretics. Denies history of CAD, PE or DVT, asthma. Denies weakness and fatigue. Denies fever and chills. Denies all other systemic symptoms.  CT showed severe bladder dilatation due to urinary retention, measuring 22.6 x 14.3 x 18 x 4 cm resulting in upstream bilateral moderate hydroureteronephrosis and enlarged prostate.  Dr. D'Amico in Urology consulted for urinary retention and enlarged prostate.  Luo catheter was placed for urinary retention.  US Prostate transrectal showed   Grade  3+ enlargement of the prostate. Patient went into A-fib, so cardiology was consulted, medications from home restarted.      Patient left the hospital in November 2020 for an atrial fibrillation.  He was on sotalol therapy.  He states that since then he has been doing well.  He denies any symptoms of chest pain or shortness of palpitations.    EKG performed today shows sinus rhythm at a rate of 61 bpm QRS ration 90 ms QT corrected 513 ms.  Rhythm strip shows the same pattern.      Past Medical History  Past Medical History:   Diagnosis Date    Abnormal ECG     Abnormal findings on diagnostic imaging of heart and coronary circulation     Elevated coronary artery calcium score    Acute kidney injury (CMS-HCC) 11/2024    with hydronephrosis    Arrhythmia     Atrial fibrillation (Multi)     Benign prostatic hyperplasia without lower urinary tract symptoms     Enlarged prostate without lower urinary tract symptoms (luts)    Cardiomyopathy     Diverticulosis     Encounter for general adult medical examination without abnormal findings     Health care maintenance    Luo catheter in place 11/2024    Heart disease     Hypertension     Irregular heart beat     Overweight 01/26/2022    Overweight    Personal history of other infectious and parasitic  diseases     History of herpes simplex infection    Vision loss     left eye- detached retina    Visual impairment     Wears glasses        Social History  Social History     Tobacco Use    Smoking status: Former     Current packs/day: 0.00     Average packs/day: 1 pack/day for 27.7 years (27.7 ttl pk-yrs)     Types: Cigarettes     Start date: 1970     Quit date: 1997     Years since quittin.3     Passive exposure: Past    Smokeless tobacco: Never   Vaping Use    Vaping status: Never Used   Substance Use Topics    Alcohol use: Not Currently     Alcohol/week: 1.0 standard drink of alcohol     Types: 1 Standard drinks or equivalent per week     Comment: 1 DRINK A WEEK    Drug use: Yes     Frequency: 1.0 times per week     Types: Marijuana       Family History     Family History   Problem Relation Name Age of Onset    Ulcerative colitis Mother Renee Lay     Colon cancer Mother Renee A Ysabellenberger     Cancer Mother Renee Lay     Colon cancer Father Ulysses G Sullenberger     Pancreatic cancer Father Ulysses G Sullenberger     Cancer Father Ulysses G Sullenberger     Atrial fibrillation Brother Butch Lay     Ulcerative colitis Brother Butch Lay     Cancer Brother Butch Lay         Allergies:  Allergies   Allergen Reactions    Adhesive Tape-Silicones Unknown    Percocet [Oxycodone-Acetaminophen] Unknown        Outpatient Medications:  Current Outpatient Medications   Medication Instructions    amLODIPine (NORVASC) 10 mg, oral, Daily    aspirin 81 mg EC tablet 1 tablet, Daily    cholecalciferol (VITAMIN D-3) 125 mcg, Every other day    Eliquis 5 mg, oral, 2 times daily    losartan (COZAAR) 25 mg, oral, Daily    lovastatin (MEVACOR) 10 mg, oral, Daily    metoprolol succinate XL (Toprol-XL) 50 mg 24 hr tablet Take 1  tablet (50 mg) daily along with a 25 mg tablet to equal 75 mg daily. Do not crush or chew.    metoprolol succinate XL (TOPROL-XL) 75 mg,  oral, Nightly, Do not crush or chew.    nitroglycerin (NITROSTAT) 0.4 mg, Every 5 min PRN    sildenafil (VIAGRA) 100 mg, oral, Daily PRN    sotalol (Betapace) 120 mg tablet TAKE 1 AND 1/2 TABLETS(180 MG) BY MOUTH TWICE DAILY    tamsulosin (Flomax) 0.4 mg 24 hr capsule TAKE 1 CAPSULE(0.4 MG) BY MOUTH TWICE DAILY          REVIEW OF SYSTEMS  Review of Systems   All other systems reviewed and are negative.        VITALS  Vitals:    02/03/25 1037   BP: 132/74   Pulse: 64       PHYSICAL EXAM  Constitutional:       Appearance: Healthy appearance. Not in distress.   Neck:      Vascular: No JVR. JVD normal.   Pulmonary:      Effort: Pulmonary effort is normal.      Breath sounds: Normal breath sounds. No wheezing. No rhonchi. No rales.   Chest:      Chest wall: Not tender to palpatation.   Cardiovascular:      PMI at left midclavicular line. Normal rate. Regular rhythm. Normal S1. Normal S2.       Murmurs: There is no murmur.      No gallop.  No click. No rub.   Pulses:     Intact distal pulses.   Edema:     Peripheral edema absent.   Abdominal:      General: Bowel sounds are normal.      Palpations: Abdomen is soft.      Tenderness: There is no abdominal tenderness.   Musculoskeletal: Normal range of motion.         General: No tenderness. Skin:     General: Skin is warm and dry.   Neurological:      General: No focal deficit present.      Mental Status: Alert and oriented to person, place and time.           ASSESSMENT AND PLAN  Clinical impressions:  1. Nonischemic cardiomyopathy with a left ventricular ejection fraction once reduced to 30% per 2D echocardiogram dated October 19, 2021, improved to 60% per 2D echocardiogram dated December 13, 2021, New York Heart Association class II, stage B heart failure.  2. Persistent atrial fibrillation with periods of rapid ventricular response controlled on sotalol and beta-blockade and anticoagulated with Eliquis.  3. Hypertension, controlled   4. Coronary artery disease.  5.  Dyslipidemia on statin.  6. Alcohol dependency.  7. Former tobacco addiction.  8. Overweight with a BMI of 28.12.    Plan recommendations    Patient is back in normal rhythm.  Patient is to continue with sotalol therapy.    Continue Eliquis therapy.    We will prescribe magnesium 400 mg 1 tablet twice a day.  Patient will have an EKG a week after he has started magnesium therapy.    Follow my office in 3 to 6 months or sooner needed.    Risk factor modification and lifestyle modification discussed with patient. Diet , exercise and hydration discussed with patient.    I have personally review with patient during this office visit, laboratory data, echocardiogram results, stress test results, Holter-event monitor results prior and after the last electrophysiology visit. All questions has been answered.    Please excuse any errors in grammar or translation related to this dictation.  Voice recognition software was utilized to prepare this document.           Scribe Attestation  By signing my name below, I, Alysha Jacobson LPN , Scribe   attest that this documentation has been prepared under the direction and in the presence of Mihir Calle MD.

## 2025-02-04 ENCOUNTER — TELEPHONE (OUTPATIENT)
Dept: CARDIOLOGY | Facility: CLINIC | Age: 70
End: 2025-02-04
Payer: MEDICARE

## 2025-02-04 NOTE — TELEPHONE ENCOUNTER
Patient called and stated that his urologist put him on doxycycline for infection in his urine. He stated that the doxycyline can increase his QT interval and at is OV yesterday the magnesium oxide was increased to fix his QT interval. He wanted to know if this was okay with Dr. Mihir Calle MD to take. Routed to Sherie GAMBOA RN

## 2025-02-04 NOTE — TELEPHONE ENCOUNTER
Received: Today  MD Rowan Acevedo LPN  Caller: Unspecified (Today, 11:30 AM)  Doxycycline is ok , no QT prolongation              Called pt. To notify.  He understood, and said the other office already cancelled this RX, and he had not picked it up yet.  Reiterated that Doxycycline was OK to use per Dr. Calle, so pt. Is going to call urology back and advise it is OK, and see if they want to call the script back in to use.  Angela

## 2025-02-06 ENCOUNTER — APPOINTMENT (OUTPATIENT)
Dept: PRIMARY CARE | Facility: CLINIC | Age: 70
End: 2025-02-06
Payer: MEDICARE

## 2025-02-06 VITALS
OXYGEN SATURATION: 96 % | HEART RATE: 68 BPM | HEIGHT: 71 IN | WEIGHT: 195.8 LBS | SYSTOLIC BLOOD PRESSURE: 130 MMHG | DIASTOLIC BLOOD PRESSURE: 85 MMHG | BODY MASS INDEX: 27.41 KG/M2 | TEMPERATURE: 98.7 F

## 2025-02-06 DIAGNOSIS — N40.1 BENIGN PROSTATIC HYPERPLASIA WITH URINARY RETENTION: ICD-10-CM

## 2025-02-06 DIAGNOSIS — E78.2 MIXED HYPERLIPIDEMIA: ICD-10-CM

## 2025-02-06 DIAGNOSIS — I50.32 CHRONIC DIASTOLIC CONGESTIVE HEART FAILURE, NYHA CLASS 2: ICD-10-CM

## 2025-02-06 DIAGNOSIS — I10 ESSENTIAL HYPERTENSION: Primary | ICD-10-CM

## 2025-02-06 DIAGNOSIS — R33.8 BENIGN PROSTATIC HYPERPLASIA WITH URINARY RETENTION: ICD-10-CM

## 2025-02-06 PROCEDURE — 1124F ACP DISCUSS-NO DSCNMKR DOCD: CPT | Performed by: INTERNAL MEDICINE

## 2025-02-06 PROCEDURE — 3075F SYST BP GE 130 - 139MM HG: CPT | Performed by: INTERNAL MEDICINE

## 2025-02-06 PROCEDURE — 99214 OFFICE O/P EST MOD 30 MIN: CPT | Performed by: INTERNAL MEDICINE

## 2025-02-06 PROCEDURE — 1160F RVW MEDS BY RX/DR IN RCRD: CPT | Performed by: INTERNAL MEDICINE

## 2025-02-06 PROCEDURE — 1159F MED LIST DOCD IN RCRD: CPT | Performed by: INTERNAL MEDICINE

## 2025-02-06 PROCEDURE — 3079F DIAST BP 80-89 MM HG: CPT | Performed by: INTERNAL MEDICINE

## 2025-02-06 PROCEDURE — 3008F BODY MASS INDEX DOCD: CPT | Performed by: INTERNAL MEDICINE

## 2025-02-06 PROCEDURE — G2211 COMPLEX E/M VISIT ADD ON: HCPCS | Performed by: INTERNAL MEDICINE

## 2025-02-06 RX ORDER — LIDOCAINE HYDROCHLORIDE 20 MG/ML
1 JELLY TOPICAL AS NEEDED
COMMUNITY
Start: 2025-01-03

## 2025-02-06 RX ORDER — ACYCLOVIR 400 MG/1
400 TABLET ORAL
COMMUNITY
Start: 2024-11-17 | End: 2025-03-03 | Stop reason: SDUPTHER

## 2025-02-06 RX ORDER — LOVASTATIN 10 MG/1
10 TABLET ORAL DAILY
Qty: 90 TABLET | Refills: 3 | Status: SHIPPED | OUTPATIENT
Start: 2025-02-06 | End: 2026-02-06

## 2025-02-06 RX ORDER — AMLODIPINE BESYLATE 10 MG/1
10 TABLET ORAL DAILY
Qty: 90 TABLET | Refills: 3 | Status: SHIPPED | OUTPATIENT
Start: 2025-02-06 | End: 2026-02-06

## 2025-02-06 RX ORDER — FINASTERIDE 5 MG/1
5 TABLET, FILM COATED ORAL DAILY
COMMUNITY

## 2025-02-06 RX ORDER — LOSARTAN POTASSIUM AND HYDROCHLOROTHIAZIDE 25; 100 MG/1; MG/1
1 TABLET ORAL DAILY
COMMUNITY
Start: 2024-07-30 | End: 2025-02-06 | Stop reason: WASHOUT

## 2025-02-06 RX ORDER — LOSARTAN POTASSIUM 25 MG/1
25 TABLET ORAL DAILY
Qty: 90 TABLET | Refills: 3 | Status: SHIPPED | OUTPATIENT
Start: 2025-02-06 | End: 2026-02-06

## 2025-02-07 ENCOUNTER — HOME CARE VISIT (OUTPATIENT)
Dept: HOME HEALTH SERVICES | Facility: HOME HEALTH | Age: 70
End: 2025-02-07
Payer: MEDICARE

## 2025-02-07 ENCOUNTER — TELEPHONE (OUTPATIENT)
Dept: UROLOGY | Facility: HOSPITAL | Age: 70
End: 2025-02-07
Payer: MEDICARE

## 2025-02-07 NOTE — TELEPHONE ENCOUNTER
Received referral for patient to see Dr. Monteiro for HoLEP consult, called patient and left message to call office back to schedule appt

## 2025-02-10 ENCOUNTER — APPOINTMENT (OUTPATIENT)
Dept: CARDIOLOGY | Facility: CLINIC | Age: 70
End: 2025-02-10
Payer: MEDICARE

## 2025-02-12 ENCOUNTER — APPOINTMENT (OUTPATIENT)
Dept: CARDIOLOGY | Facility: CLINIC | Age: 70
End: 2025-02-12
Payer: MEDICARE

## 2025-02-12 VITALS — HEART RATE: 62 BPM

## 2025-02-12 DIAGNOSIS — I48.11 LONGSTANDING PERSISTENT ATRIAL FIBRILLATION (MULTI): ICD-10-CM

## 2025-02-12 PROCEDURE — 93000 ELECTROCARDIOGRAM COMPLETE: CPT | Performed by: INTERNAL MEDICINE

## 2025-02-12 NOTE — PROGRESS NOTES
Pt in office for EKG ordered by Dr. Calle for ling standing persistent  A. Fib. Dr. Neal in office EKG scanned to chart. Erick PARTIDA

## 2025-02-13 ENCOUNTER — HOME CARE VISIT (OUTPATIENT)
Dept: HOME HEALTH SERVICES | Facility: HOME HEALTH | Age: 70
End: 2025-02-13
Payer: MEDICARE

## 2025-02-13 VITALS
SYSTOLIC BLOOD PRESSURE: 138 MMHG | RESPIRATION RATE: 18 BRPM | HEART RATE: 79 BPM | DIASTOLIC BLOOD PRESSURE: 74 MMHG | OXYGEN SATURATION: 97 %

## 2025-02-13 PROCEDURE — G0299 HHS/HOSPICE OF RN EA 15 MIN: HCPCS

## 2025-02-14 SDOH — ECONOMIC STABILITY: GENERAL

## 2025-02-14 ASSESSMENT — PAIN SCALES - PAIN ASSESSMENT IN ADVANCED DEMENTIA (PAINAD)
CONSOLABILITY: 0 - NO NEED TO CONSOLE.
BREATHING: 0
FACIALEXPRESSION: 0 - SMILING OR INEXPRESSIVE.
TOTALSCORE: 0
BODYLANGUAGE: 0
NEGVOCALIZATION: 0 - NONE.
FACIALEXPRESSION: 0
NEGVOCALIZATION: 0
BODYLANGUAGE: 0 - RELAXED.
CONSOLABILITY: 0

## 2025-02-14 ASSESSMENT — ENCOUNTER SYMPTOMS
APPETITE LEVEL: GOOD
DENIES PAIN: 1
CHANGE IN APPETITE: UNCHANGED
PERSON REPORTING PAIN: PATIENT
SUBJECTIVE PAIN PROGRESSION: UNCHANGED

## 2025-02-14 ASSESSMENT — ACTIVITIES OF DAILY LIVING (ADL)
AMBULATION ASSISTANCE: 1
MONEY MANAGEMENT (EXPENSES/BILLS): INDEPENDENT
AMBULATION ASSISTANCE: STAND BY ASSIST

## 2025-02-16 DIAGNOSIS — N17.9 AKI (ACUTE KIDNEY INJURY) (CMS-HCC): ICD-10-CM

## 2025-02-16 DIAGNOSIS — I48.0 PAROXYSMAL ATRIAL FIBRILLATION (MULTI): ICD-10-CM

## 2025-02-16 RX ORDER — TAMSULOSIN HYDROCHLORIDE 0.4 MG/1
CAPSULE ORAL
Qty: 180 CAPSULE | Refills: 0 | Status: SHIPPED | OUTPATIENT
Start: 2025-02-16

## 2025-03-02 ENCOUNTER — PATIENT MESSAGE (OUTPATIENT)
Dept: PRIMARY CARE | Facility: CLINIC | Age: 70
End: 2025-03-02
Payer: MEDICARE

## 2025-03-02 DIAGNOSIS — B00.9 HERPES SIMPLEX VIRUS INFECTION: Primary | ICD-10-CM

## 2025-03-03 RX ORDER — ACYCLOVIR 400 MG/1
400 TABLET ORAL
Qty: 35 TABLET | Refills: 1 | Status: SHIPPED | OUTPATIENT
Start: 2025-03-03 | End: 2025-03-17

## 2025-03-04 ENCOUNTER — HOME CARE VISIT (OUTPATIENT)
Dept: HOME HEALTH SERVICES | Facility: HOME HEALTH | Age: 70
End: 2025-03-04
Payer: MEDICARE

## 2025-03-13 ENCOUNTER — HOME CARE VISIT (OUTPATIENT)
Dept: HOME HEALTH SERVICES | Facility: HOME HEALTH | Age: 70
End: 2025-03-13
Payer: MEDICARE

## 2025-03-13 VITALS
OXYGEN SATURATION: 97 % | RESPIRATION RATE: 20 BRPM | SYSTOLIC BLOOD PRESSURE: 118 MMHG | DIASTOLIC BLOOD PRESSURE: 60 MMHG | HEART RATE: 101 BPM | TEMPERATURE: 97.5 F

## 2025-03-13 DIAGNOSIS — I50.42 CHRONIC COMBINED SYSTOLIC AND DIASTOLIC CONGESTIVE HEART FAILURE, NYHA CLASS 2: Primary | ICD-10-CM

## 2025-03-13 PROCEDURE — G0299 HHS/HOSPICE OF RN EA 15 MIN: HCPCS

## 2025-03-13 RX ORDER — METOPROLOL SUCCINATE 25 MG/1
75 TABLET, EXTENDED RELEASE ORAL DAILY
Qty: 270 TABLET | Refills: 3 | Status: SHIPPED | OUTPATIENT
Start: 2025-03-13 | End: 2026-03-13

## 2025-03-13 SDOH — ECONOMIC STABILITY: GENERAL

## 2025-03-13 ASSESSMENT — ACTIVITIES OF DAILY LIVING (ADL)
AMBULATION ASSISTANCE: INDEPENDENT
MONEY MANAGEMENT (EXPENSES/BILLS): INDEPENDENT
CURRENT_FUNCTION: INDEPENDENT
AMBULATION ASSISTANCE: 1
PHYSICAL TRANSFERS ASSESSED: 1

## 2025-03-13 ASSESSMENT — ENCOUNTER SYMPTOMS
APPETITE LEVEL: GOOD
CHANGE IN APPETITE: UNCHANGED
LAST BOWEL MOVEMENT: 67277
DENIES PAIN: 1
PERSON REPORTING PAIN: PATIENT

## 2025-03-17 ENCOUNTER — HOME CARE VISIT (OUTPATIENT)
Dept: HOME HEALTH SERVICES | Facility: HOME HEALTH | Age: 70
End: 2025-03-17
Payer: MEDICARE

## 2025-03-17 VITALS
HEART RATE: 115 BPM | SYSTOLIC BLOOD PRESSURE: 134 MMHG | OXYGEN SATURATION: 97 % | TEMPERATURE: 97.5 F | RESPIRATION RATE: 18 BRPM | DIASTOLIC BLOOD PRESSURE: 89 MMHG

## 2025-03-17 PROCEDURE — G0299 HHS/HOSPICE OF RN EA 15 MIN: HCPCS

## 2025-03-17 ASSESSMENT — ENCOUNTER SYMPTOMS
LAST BOWEL MOVEMENT: 67281
PERSON REPORTING PAIN: PATIENT
APPETITE LEVEL: GOOD
CHANGE IN APPETITE: UNCHANGED
LOWER EXTREMITY EDEMA: 1
DENIES PAIN: 1

## 2025-03-17 ASSESSMENT — ACTIVITIES OF DAILY LIVING (ADL)
ENTERING_EXITING_HOME: NEEDS ASSISTANCE
OASIS_M1830: 03

## 2025-03-18 NOTE — PROGRESS NOTES
HPI    69 y.o. male being seen with the following problem list:    Problem list:  BPH, urinary retention - Luo cath. On Flomax 0.4 mg twice daily and finasteride 5 mg daily     Recent ED admission from 11/18 through 11/20 with abdominal pain and MARILIA 2/2 urinary retention. US Prostate transrectal 11/19 showed grade  3+ enlargement of the prostate, volume of 91 cc increased over previous ultrasound 2 years ago, heterogenous areas and calcifications but no reported suspicious nodules for possible malignancy     Cysto on 12/4 showed the bladder neck shows complete visual occlusion and obstruction from primarily lateral lobe hypertrophy of the prostate along with a smaller median lobe presents as well. Immediately evident was a very large capacity bladder very distended with folds suggesting the presence of some element of an atonic bladder as well as moderate trabeculation throughout with cellule formation consistent with bladder neck chronic obstruction. No evidence of any tumors or stones or suspicious lesions present    Referred by Dr. Louis D'Amico     03/19/25 - Seen today over telehealth, performed this visit using real-time telehealth tools, including an audio/video connection between Thomas Lay at home and Danny Monteiro MD at [facility] [place]. Consent for telemedicine visit was obtained.  Urinary retention, currently with cath. Failed TOV x2. Prior to his surgery, he used to have frequency and urgency. MARILIA resolved. He is on Eliquis.       Lab Results   Component Value Date    PSA 3.30 01/21/2025    PSA 7.8 (H) 11/18/2024    PSA 3.40 07/30/2024    PSA 3.16 09/06/2023    PSA 2.70 01/02/2020              Current Medications:  Current Outpatient Medications   Medication Sig Dispense Refill    amLODIPine (Norvasc) 10 mg tablet Take 1 tablet (10 mg) by mouth once daily. 90 tablet 3    aspirin 81 mg EC tablet Take 1 tablet (81 mg) by mouth once daily.      cholecalciferol (Vitamin D-3) 125 MCG (5000  UT) capsule Take 1 capsule (125 mcg) by mouth every other day.      Eliquis 5 mg tablet TAKE 1 TABLET BY MOUTH TWICE DAILY 180 tablet 3    finasteride (Proscar) 5 mg tablet Take 1 tablet (5 mg) by mouth once daily.      lidocaine 2 % mucosal jelly (Uro-Jet) Apply 5 mL (1 Application) topically if needed.      losartan (Cozaar) 25 mg tablet Take 1 tablet (25 mg) by mouth once daily. 90 tablet 3    lovastatin (Mevacor) 10 mg tablet Take 1 tablet (10 mg) by mouth once daily. 90 tablet 3    magnesium oxide (Mag-Ox) 400 mg (241.3 mg magnesium) tablet Take 1 tablet (400 mg) by mouth once daily. 180 tablet 3    metoprolol succinate XL (Toprol-XL) 25 mg 24 hr tablet Take 3 tablets (75 mg) by mouth once daily. Take 1 tablet (25 mg)  daily along with 50 mg tablet to equal total of 75 mg daily. Do not crush or chew. 270 tablet 3    nitroglycerin (Nitrostat) 0.4 mg SL tablet Place 1 tablet (0.4 mg) under the tongue every 5 minutes if needed.      sildenafil (Viagra) 100 mg tablet Take 1 tablet (100 mg) by mouth once daily as needed for erectile dysfunction. 12 tablet 3    sotalol (Betapace) 120 mg tablet TAKE 1 AND 1/2 TABLETS(180 MG) BY MOUTH TWICE DAILY 270 tablet 3    tamsulosin (Flomax) 0.4 mg 24 hr capsule TAKE 1 CAPSULE(0.4 MG) BY MOUTH TWICE DAILY 180 capsule 0     No current facility-administered medications for this visit.        Active Problems:  Thomas Lay is a 69 y.o. male with the following Problems and Medications.  Patient Active Problem List   Diagnosis    Essential hypertension    CAD (coronary artery disease)    Cardiomyopathy    CHF (congestive heart failure), NYHA class II    Elevated glucose    Elevated PSA    Herpes simplex virus infection    Mixed hyperlipidemia    Paroxysmal atrial fibrillation (Multi)    Positive colorectal cancer screening using Cologuard test    Hyperglycemia    Sigmoid diverticulosis    Adenomatous polyp of colon    Vitamin D deficiency    [place] cardiac risk 10-20% in  next 10 years    Former cigarette smoker    BMI 27.0-27.9,adult    Non-recurrent unilateral inguinal hernia without obstruction or gangrene    S/P inguinal hernia repair using synthetic patch    Hematoma    MARILIA (acute kidney injury) (CMS-HCC)    Abdominal pain, unspecified abdominal location     Current Outpatient Medications   Medication Sig Dispense Refill    amLODIPine (Norvasc) 10 mg tablet Take 1 tablet (10 mg) by mouth once daily. 90 tablet 3    aspirin 81 mg EC tablet Take 1 tablet (81 mg) by mouth once daily.      cholecalciferol (Vitamin D-3) 125 MCG (5000 UT) capsule Take 1 capsule (125 mcg) by mouth every other day.      Eliquis 5 mg tablet TAKE 1 TABLET BY MOUTH TWICE DAILY 180 tablet 3    finasteride (Proscar) 5 mg tablet Take 1 tablet (5 mg) by mouth once daily.      lidocaine 2 % mucosal jelly (Uro-Jet) Apply 5 mL (1 Application) topically if needed.      losartan (Cozaar) 25 mg tablet Take 1 tablet (25 mg) by mouth once daily. 90 tablet 3    lovastatin (Mevacor) 10 mg tablet Take 1 tablet (10 mg) by mouth once daily. 90 tablet 3    magnesium oxide (Mag-Ox) 400 mg (241.3 mg magnesium) tablet Take 1 tablet (400 mg) by mouth once daily. 180 tablet 3    metoprolol succinate XL (Toprol-XL) 25 mg 24 hr tablet Take 3 tablets (75 mg) by mouth once daily. Take 1 tablet (25 mg)  daily along with 50 mg tablet to equal total of 75 mg daily. Do not crush or chew. 270 tablet 3    nitroglycerin (Nitrostat) 0.4 mg SL tablet Place 1 tablet (0.4 mg) under the tongue every 5 minutes if needed.      sildenafil (Viagra) 100 mg tablet Take 1 tablet (100 mg) by mouth once daily as needed for erectile dysfunction. 12 tablet 3    sotalol (Betapace) 120 mg tablet TAKE 1 AND 1/2 TABLETS(180 MG) BY MOUTH TWICE DAILY 270 tablet 3    tamsulosin (Flomax) 0.4 mg 24 hr capsule TAKE 1 CAPSULE(0.4 MG) BY MOUTH TWICE DAILY 180 capsule 0     No current facility-administered medications for this visit.       PMH:  Past Medical History:    Diagnosis Date    Abnormal ECG     Abnormal findings on diagnostic imaging of heart and coronary circulation     Elevated coronary artery calcium score    Acute kidney injury (CMS-HCC) 11/2024    with hydronephrosis    Arrhythmia     Atrial fibrillation (Multi)     Benign prostatic hyperplasia without lower urinary tract symptoms     Enlarged prostate without lower urinary tract symptoms (luts)    Cardiomyopathy     Diverticulosis     Encounter for general adult medical examination without abnormal findings     Health care maintenance    Luo catheter in place 11/2024    Heart disease     Hypertension     Irregular heart beat     Overweight 01/26/2022    Overweight    Personal history of other infectious and parasitic diseases     History of herpes simplex infection    Vision loss     left eye- detached retina    Visual impairment     Wears glasses        PSH:  Past Surgical History:   Procedure Laterality Date    BACK SURGERY  1997    no hardware    CARPAL TUNNEL RELEASE      CERVICAL FUSION      CIRCUMCISION, PRIMARY  1955    COLONOSCOPY      Dr Andersen 2023    CYSTOSCOPY      EYE SURGERY      HERNIA REPAIR      HERNIA REPAIR Right 10/29/2024    RIH    HERNIA REPAIR Left 10/29/2024    LEFT SIDE INGUINAL HERNIA REPAIR    TONSILLECTOMY  1967    VASECTOMY  1985       FMH:  Family History   Problem Relation Name Age of Onset    Ulcerative colitis Mother Renee A Sullenberger     Colon cancer Mother Renee A Sullenberger     Cancer Mother Renee A Sullenberger     Colon cancer Father Ulysses G Sullenjennifer     Pancreatic cancer Father Ulysses G Sullenberger     Cancer Father Ulysses CUONG Sullenberger     Atrial fibrillation Brother Butch Lay     Ulcerative colitis Brother Butch Gradydoryjennifer     Cancer Brother Butch Lay        SHx:  Social History     Tobacco Use    Smoking status: Former     Current packs/day: 0.00     Average packs/day: 1 pack/day for 27.7 years (27.7 ttl pk-yrs)     Types:  Cigarettes     Start date: 1970     Quit date: 1997     Years since quittin.5     Passive exposure: Past    Smokeless tobacco: Never   Vaping Use    Vaping status: Never Used   Substance Use Topics    Alcohol use: Not Currently     Alcohol/week: 1.0 standard drink of alcohol     Types: 1 Standard drinks or equivalent per week     Comment: 1 DRINK A WEEK    Drug use: Yes     Frequency: 1.0 times per week     Types: Marijuana       Allergies:  Allergies   Allergen Reactions    Adhesive Tape-Silicones Unknown    Percocet [Oxycodone-Acetaminophen] Unknown       Assessment/Plan  69 year old male with urinary retention s/p herniorrhaphy 10/29/24. Currently with wilson cath.     We had a long and thorough discussion regarding the natural history and options for treatment for bothersome prostatic enlargement.  We discussed that observation is an option for minimally symptomatic BPH and the role of medical therapy, but surgical management is recommended for bothersome symptoms despite appropriate medical therapy, when a patient desires to avoid medications, severe or recurrent urinary tract infections, recurrent hematuria attributed to prostatic bleeding, urinary retention, or concern for upper tract damage caused by high pressure voiding and/or incomplete bladder emptying.     We discussed minimally invasive surgical therapies (MIST) including Rezum and UroLift along with their specific indications, risks, and benefits.  Given the size of his prostate, these would not be appropriate for him.  We discussed surgical options including transurethral resection of prostate (TURP), greenlight PVP, HoLEP, open simple prostatectomy, and robotic simple prostatectomy.  Again, given the size of his gland he would benefit from an enucleative approach.  We discussed the relative merits of robotic simple prostatectomy, open simple prostatectomy, and holmium laser enucleation of the prostate.  In particular, given the  minimally invasive approach with associated short duration of catheter, low complication rate, and possibility for an outpatient or overnight stay, I recommended he consider a HoLEP.     We had a long discussion about holmium laser enucleation of the prostate.  I explained how the procedure is done and jackeline diagrams.  I discussed the perioperative pathway, likely 1 night with a catheter and either outpatient or overnight observation in the hospital.  Discussed risks including acute and delayed bleeding, infection, risk of incontinence, risk of anejaculation.  In particular, regarding the risk of temporary incontinence we discussed the literature that reports approximately a third of men at 3 months will have some degree of bothersome leakage, but that number drops to less than 1% at 1 year.  Discussed the low probability of blood transfusion.  I discussed that following the procedure he would not be able to ejaculate, but would still obtain erection and orgasm at his current sexual function. We discussed the possibility of repeat operation, though uncommon with HoLEP.     Discussed surgery may or may not resolve his symptoms but offered to try to maximally de obstruct his prostate to try to get him emptying to safe levels and reduce risk of future UTI. Another option, that may or may not work is PAE.     He understands this and would like to think about it and do one more TOV. Will put in an order for this. He will need to hold off his Eliquis 48 hours before and after procedure.     Will await his call.    Scribe Attestation  By signing my name below, I, Anshu Chacon, attest that this documentation has been prepared under the direction and in the presence of Danny Monteiro MD.

## 2025-03-19 ENCOUNTER — TELEMEDICINE (OUTPATIENT)
Dept: UROLOGY | Facility: HOSPITAL | Age: 70
End: 2025-03-19
Payer: MEDICARE

## 2025-03-19 DIAGNOSIS — N13.9 OBSTRUCTIVE UROPATHY: ICD-10-CM

## 2025-03-19 DIAGNOSIS — R33.8 BENIGN PROSTATIC HYPERPLASIA WITH URINARY RETENTION: Primary | ICD-10-CM

## 2025-03-19 DIAGNOSIS — N40.1 BENIGN PROSTATIC HYPERPLASIA WITH URINARY RETENTION: Primary | ICD-10-CM

## 2025-03-19 PROCEDURE — 99204 OFFICE O/P NEW MOD 45 MIN: CPT | Performed by: UROLOGY

## 2025-03-19 PROCEDURE — 1123F ACP DISCUSS/DSCN MKR DOCD: CPT | Performed by: UROLOGY

## 2025-03-20 PROBLEM — R33.8 BENIGN PROSTATIC HYPERPLASIA WITH URINARY RETENTION: Status: ACTIVE | Noted: 2025-03-20

## 2025-03-20 PROBLEM — N13.9 OBSTRUCTIVE UROPATHY: Status: ACTIVE | Noted: 2025-03-20

## 2025-03-20 PROBLEM — N40.1 BENIGN PROSTATIC HYPERPLASIA WITH URINARY RETENTION: Status: ACTIVE | Noted: 2025-03-20

## 2025-03-28 DIAGNOSIS — I48.0 PAROXYSMAL ATRIAL FIBRILLATION (MULTI): Primary | ICD-10-CM

## 2025-03-28 RX ORDER — SOTALOL HYDROCHLORIDE 120 MG/1
180 TABLET ORAL EVERY 12 HOURS
Qty: 270 TABLET | Refills: 3 | Status: SHIPPED | OUTPATIENT
Start: 2025-03-28

## 2025-03-28 NOTE — TELEPHONE ENCOUNTER
Received request for prescription refills for patient.   Patient follows with Dr. Calle    Request is for sotalol 180mg twice daily- dose confirmed with patient   Is patient currently on medication yes    Last OV 02/03/2025  Next OV 05/19/2025    Pended for signing and sent to provider

## 2025-04-10 ENCOUNTER — HOME CARE VISIT (OUTPATIENT)
Dept: HOME HEALTH SERVICES | Facility: HOME HEALTH | Age: 70
End: 2025-04-10
Payer: MEDICARE

## 2025-04-10 VITALS
HEART RATE: 76 BPM | TEMPERATURE: 97.8 F | DIASTOLIC BLOOD PRESSURE: 80 MMHG | RESPIRATION RATE: 16 BRPM | SYSTOLIC BLOOD PRESSURE: 142 MMHG | OXYGEN SATURATION: 98 %

## 2025-04-10 VITALS
SYSTOLIC BLOOD PRESSURE: 130 MMHG | DIASTOLIC BLOOD PRESSURE: 80 MMHG | HEART RATE: 72 BPM | TEMPERATURE: 98 F | RESPIRATION RATE: 16 BRPM

## 2025-04-10 PROCEDURE — G0299 HHS/HOSPICE OF RN EA 15 MIN: HCPCS

## 2025-04-10 ASSESSMENT — ENCOUNTER SYMPTOMS
APPETITE LEVEL: GOOD
DENIES PAIN: 1
DENIES PAIN: 1
MUSCLE WEAKNESS: 1
APPETITE LEVEL: GOOD
MUSCLE WEAKNESS: 1
CHANGE IN APPETITE: UNCHANGED

## 2025-04-10 NOTE — CASE COMMUNICATION
as discussed via secure chat earlier today performed VT with PVR 100cc, called patient to inform to schedule appointment for 2 weeks to check PVR in office and to see how he was doing, says he was unable to void anymore and is feeling abdominal/pelvic pressure and having bladder spasms, new wilson inserted and returned about 550ml of yellow clear consistency urine, patient tolerated insertion well and says he has a lot of relief  patient  says he is interested in self catheterization as discussed at his virtual appointment with you, if you would like [facility] to teach self cath please send orders

## 2025-05-08 ENCOUNTER — HOME CARE VISIT (OUTPATIENT)
Dept: HOME HEALTH SERVICES | Facility: HOME HEALTH | Age: 70
End: 2025-05-08
Payer: MEDICARE

## 2025-05-08 VITALS
BODY MASS INDEX: 27.62 KG/M2 | OXYGEN SATURATION: 98 % | SYSTOLIC BLOOD PRESSURE: 148 MMHG | HEART RATE: 64 BPM | WEIGHT: 198 LBS | RESPIRATION RATE: 20 BRPM | TEMPERATURE: 97.7 F | DIASTOLIC BLOOD PRESSURE: 62 MMHG

## 2025-05-08 DIAGNOSIS — I25.10 CORONARY ARTERY DISEASE WITHOUT ANGINA PECTORIS, UNSPECIFIED VESSEL OR LESION TYPE, UNSPECIFIED WHETHER NATIVE OR TRANSPLANTED HEART: Primary | ICD-10-CM

## 2025-05-08 PROCEDURE — G0299 HHS/HOSPICE OF RN EA 15 MIN: HCPCS

## 2025-05-08 RX ORDER — METOPROLOL SUCCINATE 25 MG/1
75 TABLET, EXTENDED RELEASE ORAL DAILY
Qty: 270 TABLET | Refills: 3 | Status: SHIPPED | OUTPATIENT
Start: 2025-05-08 | End: 2026-05-08

## 2025-05-08 SDOH — ECONOMIC STABILITY: GENERAL

## 2025-05-08 ASSESSMENT — ENCOUNTER SYMPTOMS
PERSON REPORTING PAIN: PATIENT
APPETITE LEVEL: GOOD
LAST BOWEL MOVEMENT: 67333
DENIES PAIN: 1

## 2025-05-14 ENCOUNTER — TELEPHONE (OUTPATIENT)
Dept: CARDIOLOGY | Facility: CLINIC | Age: 70
End: 2025-05-14
Payer: MEDICARE

## 2025-05-14 DIAGNOSIS — I10 ESSENTIAL HYPERTENSION: ICD-10-CM

## 2025-05-14 DIAGNOSIS — I48.11 LONGSTANDING PERSISTENT ATRIAL FIBRILLATION (MULTI): ICD-10-CM

## 2025-05-14 RX ORDER — METOPROLOL SUCCINATE 50 MG/1
50 TABLET, EXTENDED RELEASE ORAL DAILY
Qty: 90 TABLET | Refills: 3 | Status: CANCELLED | OUTPATIENT
Start: 2025-05-14 | End: 2026-05-14

## 2025-05-14 NOTE — TELEPHONE ENCOUNTER
Message forwarded from Dr. Harrison to Dr. Calle, regarding MyChart message for beta blocker clarification and refills.  Called pt.  He states his current dose is 50mg at bedtime.  Advised I will correct his med list in our chart, and pend for clarification and refills as he requested.  He understood.  Angela

## 2025-05-15 DIAGNOSIS — N17.9 AKI (ACUTE KIDNEY INJURY): ICD-10-CM

## 2025-05-15 DIAGNOSIS — I48.0 PAROXYSMAL ATRIAL FIBRILLATION (MULTI): ICD-10-CM

## 2025-05-15 DIAGNOSIS — R33.8 BENIGN PROSTATIC HYPERPLASIA WITH URINARY RETENTION: ICD-10-CM

## 2025-05-15 DIAGNOSIS — N40.1 BENIGN PROSTATIC HYPERPLASIA WITH URINARY RETENTION: ICD-10-CM

## 2025-05-15 RX ORDER — TAMSULOSIN HYDROCHLORIDE 0.4 MG/1
CAPSULE ORAL
Qty: 180 CAPSULE | Refills: 0 | OUTPATIENT
Start: 2025-05-15

## 2025-05-15 RX ORDER — TAMSULOSIN HYDROCHLORIDE 0.4 MG/1
0.4 CAPSULE ORAL
Qty: 30 CAPSULE | Refills: 5 | Status: SHIPPED | OUTPATIENT
Start: 2025-05-15 | End: 2025-11-11

## 2025-05-19 ENCOUNTER — HOME CARE VISIT (OUTPATIENT)
Dept: HOME HEALTH SERVICES | Facility: HOME HEALTH | Age: 70
End: 2025-05-19
Payer: MEDICARE

## 2025-05-19 ENCOUNTER — APPOINTMENT (OUTPATIENT)
Dept: CARDIOLOGY | Facility: CLINIC | Age: 70
End: 2025-05-19
Payer: MEDICARE

## 2025-05-19 VITALS
HEIGHT: 70 IN | BODY MASS INDEX: 28.2 KG/M2 | DIASTOLIC BLOOD PRESSURE: 70 MMHG | WEIGHT: 197 LBS | HEART RATE: 92 BPM | SYSTOLIC BLOOD PRESSURE: 110 MMHG

## 2025-05-19 VITALS
SYSTOLIC BLOOD PRESSURE: 118 MMHG | RESPIRATION RATE: 16 BRPM | HEART RATE: 91 BPM | TEMPERATURE: 98.3 F | DIASTOLIC BLOOD PRESSURE: 68 MMHG | OXYGEN SATURATION: 98 %

## 2025-05-19 DIAGNOSIS — Z51.81 ANTICOAGULATION MANAGEMENT ENCOUNTER: ICD-10-CM

## 2025-05-19 DIAGNOSIS — I10 ESSENTIAL HYPERTENSION: ICD-10-CM

## 2025-05-19 DIAGNOSIS — I42.0 DILATED CARDIOMYOPATHY (MULTI): ICD-10-CM

## 2025-05-19 DIAGNOSIS — Z87.891 FORMER CIGARETTE SMOKER: ICD-10-CM

## 2025-05-19 DIAGNOSIS — Z79.01 ANTICOAGULATION MANAGEMENT ENCOUNTER: ICD-10-CM

## 2025-05-19 DIAGNOSIS — I48.11 LONGSTANDING PERSISTENT ATRIAL FIBRILLATION (MULTI): Primary | ICD-10-CM

## 2025-05-19 PROCEDURE — 99215 OFFICE O/P EST HI 40 MIN: CPT | Performed by: INTERNAL MEDICINE

## 2025-05-19 PROCEDURE — 1159F MED LIST DOCD IN RCRD: CPT | Performed by: INTERNAL MEDICINE

## 2025-05-19 PROCEDURE — 3078F DIAST BP <80 MM HG: CPT | Performed by: INTERNAL MEDICINE

## 2025-05-19 PROCEDURE — 3008F BODY MASS INDEX DOCD: CPT | Performed by: INTERNAL MEDICINE

## 2025-05-19 PROCEDURE — G0299 HHS/HOSPICE OF RN EA 15 MIN: HCPCS

## 2025-05-19 PROCEDURE — 3074F SYST BP LT 130 MM HG: CPT | Performed by: INTERNAL MEDICINE

## 2025-05-19 PROCEDURE — 1036F TOBACCO NON-USER: CPT | Performed by: INTERNAL MEDICINE

## 2025-05-19 PROCEDURE — 93000 ELECTROCARDIOGRAM COMPLETE: CPT | Performed by: INTERNAL MEDICINE

## 2025-05-19 RX ORDER — METOPROLOL SUCCINATE 50 MG/1
50 TABLET, EXTENDED RELEASE ORAL DAILY
COMMUNITY
Start: 2019-01-10

## 2025-05-19 SDOH — ECONOMIC STABILITY: HOUSING INSECURITY: HOME SAFETY: HAS HANDBOOK IN THE HOME

## 2025-05-19 ASSESSMENT — ENCOUNTER SYMPTOMS
DENIES PAIN: 1
APPETITE LEVEL: GOOD
MUSCLE WEAKNESS: 1

## 2025-05-19 ASSESSMENT — ACTIVITIES OF DAILY LIVING (ADL)
ENTERING_EXITING_HOME: SUPERVISION
OASIS_M1830: 03

## 2025-05-19 NOTE — PATIENT INSTRUCTIONS
Follow up 6 months with EKG    24 Hour Holter prior to next appointment  You will be scheduled for a heart monitor to be applied.  On the day you come in to have the monitor applied, please shower prior to coming in and do not apply any creams, lotions or powders to your chest prior to coming in on the day your monitor is scheduled to be applied.      DID YOU KNOW  We have a pharmacy here in the [facility].  They can fill all prescriptions, not just cardiac medications.  Prescriptions from other pharmacies can easily be transferred to the  pharmacy by the  pharmacist on site.   pharmacies offer FREE HOME DELIVERY on medications to anywhere in [place]. They can sync your medications. Typically prescriptions can be ready in 10 - 15 minutes. If pharmacy is unable to fill your  prescription or if cost is more than your paying now the Pharmacist can easily transfer back to your Pharmacy of choice. Pharmacy phone # [phone].     Please bring all medicines, vitamins, and herbal supplements with you in original bottles to every appointment!!!!    Prescriptions will not be filled unless you are compliant with your follow up appointments or have a follow up appointment scheduled as per instruction of your physician. Refills should be requested at the time of your visit.

## 2025-05-19 NOTE — PROGRESS NOTES
CARDIOLOGY OFFICE VISIT      CHIEF COMPLAINT  Chief Complaint   Patient presents with    Follow-up       HISTORY OF PRESENT ILLNESS  HPI  69-year-old  male who is followed for nonischemic cardiomyopathy with a left ventricular ejection fraction was reduced to 30%, improved to 60% per 2D echocardiogram dated December 13, 2021, New York Heart Association class II, stage B heart failure, persistent atrial fibrillation with periods of rapid ventricular response on sotalol, magnesium oxide, and beta-blockade and anticoagulated with Eliquis. Review of the medical records reveals patient was seen  on April 3, 2024 at which time the sotalol dose was increased from 120 mg twice a day to 180 mg twice a day for complaints of palpitations occurring 1 time per week.     Patient was admitted to [facility] in November 2024.  He presents the emergency department to be evaluated for abdominal pain. Patient had a left inguinal hernia repair with mesh placement on 29 October by Dr. Benjamin. Patient states that the surgery went well however he has been having generalized swelling of the abdomen ever since. He denies constipation or diarrhea. Denies nausea or vomiting. Denies urgency frequency and dysuria. Denies blood in the urine or stool. He followed up with Dr. Benjamin as an outpatient who is concerned about the patient's worsening anemia, worsening kidney function, and abdominal swelling as this can represent a postoperative infection or hematoma. Recommended that the patient be admitted to the hospital under his service after he has basic blood work and a CT abdomen and pelvis. He originally wanted a CTA however given the patient's kidney function, contrast not recommended at this time. Patient is on Eliquis due to his history of A-fib and discontinue the Eliquis 5 days before and 5 days after surgery but he is currently taking the Eliquis again. Patient states that he has had some swelling and bruising of his  abdomen ever since the surgery and states that it is slowly getting worse. Patient denies chest pain and shortness of breath. With his history of A-fib he also has a history of CHF and is on diuretics. Denies history of CAD, PE or DVT, asthma. Denies weakness and fatigue. Denies fever and chills. Denies all other systemic symptoms.  CT showed severe bladder dilatation due to urinary retention, measuring 22.6 x 14.3 x 18 x 4 cm resulting in upstream bilateral moderate hydroureteronephrosis and enlarged prostate.  Dr. D'Amico in Urology consulted for urinary retention and enlarged prostate.  Luo catheter was placed for urinary retention.  US Prostate transrectal showed   Grade  3+ enlargement of the prostate. Patient went into A-fib, so cardiology was consulted, medications from home restarted.       Patient left the hospital in November 2020 for an atrial fibrillation.  He was on sotalol therapy.    EKG performed in  February 12, 2025 shows sinus rhythm rate of 62 bpm QRS ration 92 ms QT corrected 481 ms.    Patient states that he continues having recordings of atrial fibrillation by his watch.  He has not had any symptoms.  Denies any palpitations.  No shortness of breath.    EKG performed today during this evaluation shows atrial fibrillation rate of 97 bpm QRS duration 96 ms QT corrected 480 ms.  Rhythm strip shows the same pattern.      Past Medical History  Medical History[1]    Social History  Social History[2]    Family History   Family History[3]     Allergies:  RX Allergies[4]     Outpatient Medications:  Current Outpatient Medications   Medication Instructions    amLODIPine (NORVASC) 10 mg, oral, Daily    aspirin 81 mg EC tablet 1 tablet, Daily    cholecalciferol (VITAMIN D-3) 125 mcg, Every other day    Eliquis 5 mg, oral, 2 times daily    finasteride (PROSCAR) 5 mg, Daily    lidocaine 2 % mucosal jelly (Uro-Jet) 1 Application, As needed    losartan (COZAAR) 25 mg, oral, Daily    lovastatin (MEVACOR) 10  mg, oral, Daily    magnesium oxide (MAG-OX) 400 mg, oral, Daily    metoprolol succinate XL (TOPROL-XL) 50 mg, Daily    nitroglycerin (NITROSTAT) 0.4 mg, Every 5 min PRN    sildenafil (VIAGRA) 100 mg, oral, Daily PRN    sotalol (BETAPACE) 180 mg, oral, Every 12 hours    tamsulosin (FLOMAX) 0.4 mg, oral, Daily before breakfast          REVIEW OF SYSTEMS  ROS      VITALS  Vitals:    05/19/25 0847   BP: 110/70   Pulse: 92       PHYSICAL EXAM  Physical Exam      ASSESSMENT AND PLAN    Clinical impressions:  1. Nonischemic cardiomyopathy with a left ventricular ejection fraction once reduced to 30% per 2D echocardiogram dated October 19, 2021, improved to 60% per 2D echocardiogram dated December 13, 2021, New York Heart Association class II, stage B heart failure.  2. Persistent atrial fibrillation with periods of rapid ventricular response controlled on sotalol and beta-blockade and anticoagulated with Eliquis.  3. Hypertension, controlled   4. Coronary artery disease.  5. Dyslipidemia on statin.  6. Alcohol dependency.  7. Former tobacco addiction.  8. Overweight with a BMI of 28.12.    Plan recommendation    I had a lengthy discussion with patient regarding management of atrial fibrillation.  Patient is using higher doses of sotalol (180 mg 1 tablet twice a day) and history of Episodes of atrial fibrillation.  He has baseline QT interval is still in the upper limits.  We discussed the option of rate control versus rhythm control strategy for atrial fibrillation.    Patient states that he is not too symptomatic.  He would like to stay on current medical therapy.  Patient will be seen my office in 6 months.    If he started having episodes of palpitations or symptomatic atrial fibrillation, we will have to discontinue sotalol and change for a different antiarrhythmic therapy.    Continue with anticoagulant therapy with Eliquis.    Continue beta-blocker therapy.    Follow my office every 6 months or sooner if  needed.    Holter monitor for 24 hours prior to next office visit.    Risk factor modification and lifestyle modification discussed with patient. Diet , exercise and hydration discussed with patient.    I have personally review with patient during this office visit, laboratory data, echocardiogram results, stress test results, Holter-event monitor results prior and after the last electrophysiology visit. All questions has been answered.    Please excuse any errors in grammar or translation related to this dictation.  Voice recognition software was utilized to prepare this document.         I, Dr. Calle, personally performed the services described in the documentation as scribed by the nurse in my presence, and confirm it is both accurate and complete.     Scribe Attestation  By signing my name below, I, Alysha Jacobson LPN , Scribe   attest that this documentation has been prepared under the direction and in the presence of Mihir Calle MD         [1]   Past Medical History:  Diagnosis Date    Abnormal ECG     Abnormal findings on diagnostic imaging of heart and coronary circulation     Elevated coronary artery calcium score    Acute kidney injury 11/2024    with hydronephrosis    Arrhythmia     Atrial fibrillation (Multi)     Benign prostatic hyperplasia without lower urinary tract symptoms     Enlarged prostate without lower urinary tract symptoms (luts)    Cardiomyopathy     Diverticulosis     Encounter for general adult medical examination without abnormal findings     Health care maintenance    Luo catheter in place 11/2024    Heart disease     Hypertension     Irregular heart beat     Overweight 01/26/2022    Overweight    Personal history of other infectious and parasitic diseases     History of herpes simplex infection    Vision loss     left eye- detached retina    Visual impairment     Wears glasses    [2]   Social History  Tobacco Use    Smoking status: Former     Current packs/day: 0.00     Average  packs/day: 1 pack/day for 27.7 years (27.7 ttl pk-yrs)     Types: Cigarettes     Start date: 1970     Quit date: 1997     Years since quittin.6     Passive exposure: Past    Smokeless tobacco: Never   Vaping Use    Vaping status: Never Used   Substance Use Topics    Alcohol use: Not Currently     Alcohol/week: 1.0 standard drink of alcohol     Types: 1 Standard drinks or equivalent per week     Comment: 1 DRINK A WEEK    Drug use: Yes     Frequency: 1.0 times per week     Types: Marijuana   [3]   Family History  Problem Relation Name Age of Onset    Ulcerative colitis Mother Renee A Rosanne     Colon cancer Mother Renee A Sullenberger     Cancer Mother Renee A Rosanne     Colon cancer Father Ulysses G Sullenberger     Pancreatic cancer Father Ulysses CUONG Sullenberger     Cancer Father Ulysses G Sullenberger     Atrial fibrillation Brother Butch Lay     Ulcerative colitis Brother Butch Lay     Cancer Brother Butch Lay    [4]   Allergies  Allergen Reactions    Adhesive Tape-Silicones Unknown    Percocet [Oxycodone-Acetaminophen] Unknown

## 2025-05-20 PROCEDURE — 400014 HH F/U

## 2025-06-05 ENCOUNTER — HOME CARE VISIT (OUTPATIENT)
Dept: HOME HEALTH SERVICES | Facility: HOME HEALTH | Age: 70
End: 2025-06-05
Payer: MEDICARE

## 2025-06-05 VITALS — SYSTOLIC BLOOD PRESSURE: 130 MMHG | RESPIRATION RATE: 18 BRPM | DIASTOLIC BLOOD PRESSURE: 91 MMHG | HEART RATE: 81 BPM

## 2025-06-05 PROCEDURE — G0299 HHS/HOSPICE OF RN EA 15 MIN: HCPCS

## 2025-06-05 SDOH — ECONOMIC STABILITY: GENERAL

## 2025-06-05 ASSESSMENT — PAIN SCALES - PAIN ASSESSMENT IN ADVANCED DEMENTIA (PAINAD)
NEGVOCALIZATION: 0
BREATHING: 0
BODYLANGUAGE: 0 - RELAXED.
FACIALEXPRESSION: 0
NEGVOCALIZATION: 0 - NONE.
CONSOLABILITY: 0
TOTALSCORE: 0
BODYLANGUAGE: 0
FACIALEXPRESSION: 0 - SMILING OR INEXPRESSIVE.
CONSOLABILITY: 0 - NO NEED TO CONSOLE.

## 2025-06-05 ASSESSMENT — ENCOUNTER SYMPTOMS
DENIES PAIN: 1
SUBJECTIVE PAIN PROGRESSION: UNCHANGED
PERSON REPORTING PAIN: PATIENT
APPETITE LEVEL: GOOD
CHANGE IN APPETITE: UNCHANGED

## 2025-06-05 ASSESSMENT — ACTIVITIES OF DAILY LIVING (ADL)
MONEY MANAGEMENT (EXPENSES/BILLS): INDEPENDENT
AMBULATION ASSISTANCE: 1

## 2025-07-04 ENCOUNTER — HOME CARE VISIT (OUTPATIENT)
Dept: HOME HEALTH SERVICES | Facility: HOME HEALTH | Age: 70
End: 2025-07-04
Payer: MEDICARE

## 2025-07-04 VITALS — SYSTOLIC BLOOD PRESSURE: 151 MMHG | RESPIRATION RATE: 18 BRPM | HEART RATE: 59 BPM | DIASTOLIC BLOOD PRESSURE: 88 MMHG

## 2025-07-04 PROCEDURE — G0299 HHS/HOSPICE OF RN EA 15 MIN: HCPCS

## 2025-07-04 SDOH — ECONOMIC STABILITY: GENERAL

## 2025-07-04 ASSESSMENT — PAIN SCALES - PAIN ASSESSMENT IN ADVANCED DEMENTIA (PAINAD)
FACIALEXPRESSION: 0
NEGVOCALIZATION: 0 - NONE.
BODYLANGUAGE: 0 - RELAXED.
CONSOLABILITY: 0 - NO NEED TO CONSOLE.
TOTALSCORE: 0
CONSOLABILITY: 0
BREATHING: 0
FACIALEXPRESSION: 0 - SMILING OR INEXPRESSIVE.
BODYLANGUAGE: 0
NEGVOCALIZATION: 0

## 2025-07-04 ASSESSMENT — ENCOUNTER SYMPTOMS
DENIES PAIN: 1
OCCASIONAL FEELINGS OF UNSTEADINESS: 0
SUBJECTIVE PAIN PROGRESSION: UNCHANGED
APPETITE LEVEL: GOOD
PERSON REPORTING PAIN: PATIENT

## 2025-07-04 ASSESSMENT — ACTIVITIES OF DAILY LIVING (ADL)
AMBULATION ASSISTANCE: 1
AMBULATION ASSISTANCE: STAND BY ASSIST
MONEY MANAGEMENT (EXPENSES/BILLS): INDEPENDENT

## 2025-07-07 DIAGNOSIS — N40.1 BENIGN PROSTATIC HYPERPLASIA WITH URINARY RETENTION: ICD-10-CM

## 2025-07-07 DIAGNOSIS — R33.8 BENIGN PROSTATIC HYPERPLASIA WITH URINARY RETENTION: ICD-10-CM

## 2025-07-08 RX ORDER — TAMSULOSIN HYDROCHLORIDE 0.4 MG/1
0.4 CAPSULE ORAL
Qty: 30 CAPSULE | Refills: 0 | Status: SHIPPED | OUTPATIENT
Start: 2025-07-08 | End: 2025-08-07

## 2025-07-14 ENCOUNTER — HOME CARE VISIT (OUTPATIENT)
Dept: HOME HEALTH SERVICES | Facility: HOME HEALTH | Age: 70
End: 2025-07-14
Payer: MEDICARE

## 2025-07-14 VITALS — SYSTOLIC BLOOD PRESSURE: 132 MMHG | HEART RATE: 88 BPM | RESPIRATION RATE: 18 BRPM | DIASTOLIC BLOOD PRESSURE: 96 MMHG

## 2025-07-14 PROCEDURE — G0299 HHS/HOSPICE OF RN EA 15 MIN: HCPCS

## 2025-07-14 ASSESSMENT — PAIN SCALES - PAIN ASSESSMENT IN ADVANCED DEMENTIA (PAINAD)
FACIALEXPRESSION: 0 - SMILING OR INEXPRESSIVE.
NEGVOCALIZATION: 0 - NONE.
BODYLANGUAGE: 0 - RELAXED.
NEGVOCALIZATION: 0
TOTALSCORE: 0
FACIALEXPRESSION: 0
CONSOLABILITY: 0
BODYLANGUAGE: 0
BREATHING: 0
CONSOLABILITY: 0 - NO NEED TO CONSOLE.

## 2025-07-14 ASSESSMENT — ENCOUNTER SYMPTOMS
LOSS OF SENSATION IN FEET: 0
DEPRESSION: 0
APPETITE LEVEL: GOOD
PERSON REPORTING PAIN: PATIENT
SUBJECTIVE PAIN PROGRESSION: UNCHANGED
CHANGE IN APPETITE: UNCHANGED
OCCASIONAL FEELINGS OF UNSTEADINESS: 0
DENIES PAIN: 1

## 2025-07-14 ASSESSMENT — ACTIVITIES OF DAILY LIVING (ADL)
AMBULATION ASSISTANCE: 1
OASIS_M1830: 01
ENTERING_EXITING_HOME: MINIMUM ASSIST
AMBULATION ASSISTANCE: STAND BY ASSIST

## 2025-07-18 DIAGNOSIS — I50.32 CHRONIC DIASTOLIC CONGESTIVE HEART FAILURE, NYHA CLASS 2: ICD-10-CM

## 2025-07-19 RX ORDER — LOSARTAN POTASSIUM 25 MG/1
25 TABLET ORAL DAILY
Qty: 90 TABLET | Refills: 3 | Status: SHIPPED | OUTPATIENT
Start: 2025-07-19 | End: 2026-07-19

## 2025-08-04 ENCOUNTER — APPOINTMENT (OUTPATIENT)
Dept: PRIMARY CARE | Facility: CLINIC | Age: 70
End: 2025-08-04
Payer: MEDICARE

## 2025-08-04 VITALS
TEMPERATURE: 97.3 F | WEIGHT: 199.4 LBS | HEART RATE: 66 BPM | SYSTOLIC BLOOD PRESSURE: 128 MMHG | DIASTOLIC BLOOD PRESSURE: 72 MMHG | HEIGHT: 70 IN | BODY MASS INDEX: 28.55 KG/M2 | OXYGEN SATURATION: 98 %

## 2025-08-04 DIAGNOSIS — Z00.00 ENCOUNTER FOR SUBSEQUENT ANNUAL WELLNESS VISIT (AWV) IN MEDICARE PATIENT: Primary | ICD-10-CM

## 2025-08-04 DIAGNOSIS — I50.22 CHRONIC SYSTOLIC CONGESTIVE HEART FAILURE, NYHA CLASS 2: ICD-10-CM

## 2025-08-04 DIAGNOSIS — I48.0 PAROXYSMAL ATRIAL FIBRILLATION (MULTI): ICD-10-CM

## 2025-08-04 DIAGNOSIS — I10 ESSENTIAL HYPERTENSION: ICD-10-CM

## 2025-08-04 PROBLEM — N17.9 AKI (ACUTE KIDNEY INJURY): Status: RESOLVED | Noted: 2024-11-18 | Resolved: 2025-08-04

## 2025-08-04 PROBLEM — R73.9 HYPERGLYCEMIA: Status: RESOLVED | Noted: 2023-07-27 | Resolved: 2025-08-04

## 2025-08-04 PROCEDURE — G0439 PPPS, SUBSEQ VISIT: HCPCS | Performed by: INTERNAL MEDICINE

## 2025-08-04 PROCEDURE — 1126F AMNT PAIN NOTED NONE PRSNT: CPT | Performed by: INTERNAL MEDICINE

## 2025-08-04 PROCEDURE — 1159F MED LIST DOCD IN RCRD: CPT | Performed by: INTERNAL MEDICINE

## 2025-08-04 PROCEDURE — 1170F FXNL STATUS ASSESSED: CPT | Performed by: INTERNAL MEDICINE

## 2025-08-04 PROCEDURE — 3074F SYST BP LT 130 MM HG: CPT | Performed by: INTERNAL MEDICINE

## 2025-08-04 PROCEDURE — 3078F DIAST BP <80 MM HG: CPT | Performed by: INTERNAL MEDICINE

## 2025-08-04 PROCEDURE — G0444 DEPRESSION SCREEN ANNUAL: HCPCS | Performed by: INTERNAL MEDICINE

## 2025-08-04 PROCEDURE — 99497 ADVNCD CARE PLAN 30 MIN: CPT | Performed by: INTERNAL MEDICINE

## 2025-08-04 PROCEDURE — 3008F BODY MASS INDEX DOCD: CPT | Performed by: INTERNAL MEDICINE

## 2025-08-04 PROCEDURE — 1160F RVW MEDS BY RX/DR IN RCRD: CPT | Performed by: INTERNAL MEDICINE

## 2025-08-04 PROCEDURE — G0136 PR ADMINISTRATION OF A STANDARDIZED, EVIDENCE-BASED SOCIAL DETERMINANTS OF HEALTH RISK ASSESSMENT TOOL, 5 TO 15 MINUTES: HCPCS | Performed by: INTERNAL MEDICINE

## 2025-08-04 PROCEDURE — 99214 OFFICE O/P EST MOD 30 MIN: CPT | Performed by: INTERNAL MEDICINE

## 2025-08-04 ASSESSMENT — ACTIVITIES OF DAILY LIVING (ADL)
DOING_HOUSEWORK: INDEPENDENT
TAKING_MEDICATION: INDEPENDENT
DRESSING: INDEPENDENT
GROCERY_SHOPPING: INDEPENDENT
BATHING: INDEPENDENT
MANAGING_FINANCES: INDEPENDENT

## 2025-08-04 ASSESSMENT — PAIN SCALES - GENERAL: PAINLEVEL_OUTOF10: 0-NO PAIN

## 2025-08-05 ENCOUNTER — HOME CARE VISIT (OUTPATIENT)
Dept: HOME HEALTH SERVICES | Facility: HOME HEALTH | Age: 70
End: 2025-08-05
Payer: MEDICARE

## 2025-08-05 VITALS
SYSTOLIC BLOOD PRESSURE: 133 MMHG | OXYGEN SATURATION: 97 % | DIASTOLIC BLOOD PRESSURE: 92 MMHG | RESPIRATION RATE: 18 BRPM | TEMPERATURE: 97.3 F | HEART RATE: 98 BPM

## 2025-08-05 PROCEDURE — G0299 HHS/HOSPICE OF RN EA 15 MIN: HCPCS

## 2025-08-05 RX ADMIN — LIDOCAINE HYDROCHLORIDE 5 ML: 20 JELLY TOPICAL at 11:30

## 2025-08-05 ASSESSMENT — ENCOUNTER SYMPTOMS
CHANGE IN APPETITE: UNCHANGED
LAST BOWEL MOVEMENT: 67422
APPETITE LEVEL: GOOD

## 2025-08-05 NOTE — PROGRESS NOTES
Subjective     Patient ID: Thomas Lay is a 69 y.o. male who presents for Medicare Annual Wellness Visit Subsequent.  History of Present Illness  The patient presents for a wellness visit.    Comes today for annual medical check up.  Thomas Lay is overall doing well. Chronic medical conditions are stable with current medical management. Memory and cognitive function are assessed with simple verbal cue and interview. Preventative Immunizations are age appropriately up to date. Current  home medications have been reconciled. The healthy lifestyle has been reinforced. Encouraged continued avoidance of tobacco, alcohol, non prescription drugs and poly pharmacy. Functional capacity has been assessed with quick balance and gait tests. Discussed about fall risk and safety measures at home and outside. Age appropriate cancer screening tests were recommended and ordered today. Discussed in details about advance directives, code status and health care Power of  (POA). All together it took about 16 minutes and the chart was updated. Discussed about mental health and wellbeing. The depression screening questionnaire (PHQ 9) was performed and plan was discussed for 5 mins. Another 5 minute was spent on screening the social determinants of health (SDOH), specifically housing, food insecurity, utilities and transportation.  All physical, mental social and spiritual aspects of dwight were evaluated, assessed and appropriately addressed. Today's office vital signs, recent lab results, EKG and imaging studies were reviewed. Patient expressed concern about HTN, Afib, CHF. So a complete physical exam was performed to evaluate and address the issue.        The 10-year ASCVD risk score (Martha BELCHER, et al., 2019) is: 16.8%    Values used to calculate the score:      Age: 69 years      Clincally relevant sex: Male      Is Non- : No      Diabetic: No      Tobacco smoker: No      Systolic Blood  Pressure: 128 mmHg      Is BP treated: Yes      HDL Cholesterol: 57.8 mg/dL      Total Cholesterol: 170 mg/dL       He reports no significant changes in his health status. He is currently seeking a urologist and has an appointment scheduled for 11/2025. He anticipates that his catheter will be removed by his next wellness visit. He experiences urinary retention, which he finds manageable and takes good care to prevent infections. He is not experiencing any blood in the stool or black, tarry stool. He has a history of neck surgery. He reports no cognitive issues or memory disturbances and is generally happy and satisfied with his life. He has a healthcare power of  in place. He has an upcoming dental appointment next Monday. He reports no skin concerns, rashes, spots, basal cell carcinomas, or squamous cell carcinomas. He has a long-standing issue with his elbow but reports no changes in color, shape, size, or borders. He maintains a regular diet and ensures all utility bills are paid. He has access to healthcare and transportation. He experiences mild sleep apnea, which does not significantly impact his daily life. He reports no cough, congestion, allergies, acid reflux, heartburn, kidney issues, painful urination, ankle swelling, calf tenderness, or blood clots. He plans to resume gym activities once his catheter is removed. He currently mows his lawn and goes grocery shopping for exercise. He is on tamsulosin.    He is due for a colorectal cancer exam in 2026. He has received his pneumococcal vaccine. He quit smoking in 1997.    He is scheduled for a Holter monitor test in 11/2025 to monitor his atrial fibrillation. He is on sotalol and sildenafil.    His vision is improving, with one eye returning to 20/20 vision. 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 He also had cataract surgery and lens replacement.    Social History:  Tobacco: He quit smoking in  "1997.  Sleep: He experiences mild sleep apnea, which does not significantly impact his daily life.    PAST SURGICAL HISTORY:  Retinal detachments corrected through several surgeries  Cataract surgery and lens replacement  Neck surgery    Objective   Vitals:    08/04/25 1028   BP: 128/72   BP Location: Left arm   Patient Position: Sitting   BP Cuff Size: Large adult   Pulse: 66   Temp: 36.3 °C (97.3 °F)   TempSrc: Temporal   SpO2: 98%   Weight: 90.4 kg (199 lb 6.4 oz)   Height: 1.778 m (5' 10\")     Wt Readings from Last 10 Encounters:   08/04/25 90.4 kg (199 lb 6.4 oz)   05/19/25 89.4 kg (197 lb)   05/08/25 89.8 kg (198 lb)   02/06/25 88.8 kg (195 lb 12.8 oz)   02/03/25 88 kg (194 lb)   01/17/25 88.2 kg (194 lb 6.4 oz)   12/04/24 81.8 kg (180 lb 5.4 oz)   11/21/24 90.7 kg (200 lb)   11/18/24 90.7 kg (200 lb)   10/29/24 88.4 kg (194 lb 14.2 oz)       Medication:  Current Outpatient Medications   Medication Instructions    amLODIPine (NORVASC) 10 mg, oral, Daily    aspirin 81 mg EC tablet 1 tablet, Daily    cholecalciferol (VITAMIN D-3) 125 mcg, Every other day    Eliquis 5 mg, oral, 2 times daily    finasteride (PROSCAR) 5 mg, Daily    lidocaine 2 % mucosal jelly (Uro-Jet) 1 Application, As needed    losartan (COZAAR) 25 mg, oral, Daily    lovastatin (MEVACOR) 10 mg, oral, Daily    magnesium oxide (MAG-OX) 400 mg, oral, Daily    metoprolol succinate XL (TOPROL-XL) 50 mg, Daily    metoprolol succinate XL (TOPROL-XL) 50 mg, Daily    nitroglycerin (Nitrostat) 0.4 mg SL tablet 1 tablet, Every 5 min PRN    sildenafil (VIAGRA) 100 mg, oral, Daily PRN    sotalol (BETAPACE) 180 mg, oral, Every 12 hours    tamsulosin (FLOMAX) 0.4 mg, oral, Daily before breakfast         Physical Exam  Neurological: Alert and oriented, no cognitive impairment noted.  Respiratory: Clear to auscultation, no wheezing, rales or rhonchi.  Gen:  Not in any acute distress   HE ENT: No pallor, No carotid bruit,  No thyromegaly   Heart:  RRR, S1, S2, No " murmur   Abd: No epigastric tenderness, No CVA tenderness   Extremities: No edema, calf swelling or tenderness.    Skin:  No rash, ecchymosis or erythema.      Review of Systems:  Respiratory:  Denies stridor. No blood in sputum   Cardiovascular:  Denies chest pain, no sudden excessive sweating   Gastrointestinal:  Denies sour burping, no blood in stool    Genitourinary:  Denies blood in urine    Skin:  No new spot changing color or shape or border    Neurological: Denies speech difficulty, no memory disturbance        Recent Labs:   Lab Results   Component Value Date    WBC 8.1 01/21/2025    HGB 13.9 01/21/2025    HCT 42.0 01/21/2025     01/21/2025    CHOL 170 07/30/2024    TRIG 175 (H) 07/30/2024    HDL 57.8 07/30/2024    ALT 22 11/18/2024    AST 19 11/18/2024     01/21/2025    K 4.7 01/21/2025     01/21/2025    CREATININE 1.02 01/21/2025    BUN 19 01/21/2025    CO2 28 01/21/2025    TSH 2.14 07/30/2024    PSA 3.30 01/21/2025    INR 1.3 (H) 11/18/2024    HGBA1C 5.5 08/14/2024     Lab Results   Component Value Date    GLUCOSE 100 (H) 01/21/2025    CALCIUM 9.2 01/21/2025     01/21/2025    K 4.7 01/21/2025    CO2 28 01/21/2025     01/21/2025    BUN 19 01/21/2025    CREATININE 1.02 01/21/2025      Lab Results   Component Value Date    LDLCALC 77 07/30/2024     Lab Results   Component Value Date    HGBA1C 5.5 08/14/2024    HGBA1C 5.2 08/11/2022     Lab Results   Component Value Date    LDLCALC 77 07/30/2024    CREATININE 1.02 01/21/2025     Lab Results   Component Value Date    PSA 3.30 01/21/2025    PSA 7.8 (H) 11/18/2024    PSA 3.40 07/30/2024       Diagnosis and Orders:     Encounter for subsequent annual wellness visit (AWV) in Medicare patient  Paroxysmal atrial fibrillation (Multi)  Essential hypertension  Chronic systolic congestive heart failure, NYHA class 2     Assessment & Plan  1. Health maintenance.  - Blood pressure is well-regulated at 128/72, with a satisfactory pulse  rate.  - Weight remains stable, which is commendable.  - Due for a colorectal cancer screening in 2026; pneumococcal vaccination series completed.  - Advised to continue current medication regimen and maintain a balanced diet; regular physical activity such as walking or strolling recommended.    2. Atrial fibrillation.  - Currently on sotalol and sildenafil.  - Reports tracking atrial fibrillation episodes using his watch.  - Holter monitor test scheduled for 11/2025 to monitor frequency of atrial fibrillation episodes.    3. Medication management.  - Currently taking amlodipine and magnesium 400 mg.  - Will  amlodipine today; advised to continue taking magnesium 400 mg from the pharmacy.  - Also on Eliquis and aspirin together.    4. Urinary retention.  - Catheter in place; awaiting urologist appointment in 11/2025 for voiding trial to assess ability to urinate without catheter.  - Advised to continue taking good care of catheter to prevent infections.    5. Vision issues.  - History of retinal detachment and cataract surgery.  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    Follow-up: August 2026.              This medical note was created with the assistance of artificial intelligence (AI) for documentation purposes. The content has been reviewed and confirmed by the healthcare provider for accuracy and completeness. Patient consented to the use of audio recording and use of AI during their visit.

## 2025-08-22 DIAGNOSIS — I48.0 PAROXYSMAL ATRIAL FIBRILLATION (MULTI): ICD-10-CM

## 2025-09-05 ENCOUNTER — HOME CARE VISIT (OUTPATIENT)
Dept: HOME HEALTH SERVICES | Facility: HOME HEALTH | Age: 70
End: 2025-09-05
Payer: MEDICARE

## 2025-09-05 VITALS — DIASTOLIC BLOOD PRESSURE: 87 MMHG | SYSTOLIC BLOOD PRESSURE: 140 MMHG | RESPIRATION RATE: 18 BRPM | HEART RATE: 87 BPM

## 2025-09-05 PROCEDURE — G0299 HHS/HOSPICE OF RN EA 15 MIN: HCPCS

## 2025-09-05 ASSESSMENT — ENCOUNTER SYMPTOMS
PERSON REPORTING PAIN: PATIENT
SUBJECTIVE PAIN PROGRESSION: UNCHANGED
CHANGE IN APPETITE: UNCHANGED
DENIES PAIN: 1
APPETITE LEVEL: GOOD

## 2025-09-05 ASSESSMENT — ACTIVITIES OF DAILY LIVING (ADL)
AMBULATION ASSISTANCE: STAND BY ASSIST
AMBULATION ASSISTANCE: 1

## 2025-09-05 ASSESSMENT — PAIN SCALES - PAIN ASSESSMENT IN ADVANCED DEMENTIA (PAINAD)
CONSOLABILITY: 0 - NO NEED TO CONSOLE.
NEGVOCALIZATION: 0
FACIALEXPRESSION: 0 - SMILING OR INEXPRESSIVE.
NEGVOCALIZATION: 0 - NONE.
CONSOLABILITY: 0
BREATHING: 0
TOTALSCORE: 0
BODYLANGUAGE: 0
FACIALEXPRESSION: 0
BODYLANGUAGE: 0 - RELAXED.

## 2025-10-30 ENCOUNTER — APPOINTMENT (OUTPATIENT)
Dept: UROLOGY | Facility: CLINIC | Age: 70
End: 2025-10-30
Payer: MEDICARE

## 2025-11-03 ENCOUNTER — APPOINTMENT (OUTPATIENT)
Dept: CARDIOLOGY | Facility: CLINIC | Age: 70
End: 2025-11-03
Payer: MEDICARE

## 2025-11-17 ENCOUNTER — APPOINTMENT (OUTPATIENT)
Dept: CARDIOLOGY | Facility: CLINIC | Age: 70
End: 2025-11-17
Payer: MEDICARE

## 2026-01-16 ENCOUNTER — APPOINTMENT (OUTPATIENT)
Dept: CARDIOLOGY | Facility: CLINIC | Age: 71
End: 2026-01-16
Payer: MEDICARE

## 2026-09-09 ENCOUNTER — APPOINTMENT (OUTPATIENT)
Dept: PRIMARY CARE | Facility: CLINIC | Age: 71
End: 2026-09-09
Payer: MEDICARE

## (undated) DEVICE — NEEDLE, SAFETY, 22 G X 1.5 IN

## (undated) DEVICE — STRAP, VELCRO, BODY, 4 X 60IN, NS

## (undated) DEVICE — GLOVE, SURGICAL, PROTEXIS NEOPRENE, 8.0, PF, LF

## (undated) DEVICE — TUBING, SUCTION, 6MM X 10, CLEAN N-COND

## (undated) DEVICE — GOWN, SURGICAL, ROYAL SILK, XL, STERILE

## (undated) DEVICE — SOLUTION, IRRIGATION, USP, STERILE WATER, 1000 ML, BAG

## (undated) DEVICE — GLOVE, SURGICAL, PROTEXIS PI BLUE W/NEUTHERA, 7.5, PF, LF

## (undated) DEVICE — Device

## (undated) DEVICE — GLOVE, SURGICAL, PROTEXIS PI , 7.5, PF, LF

## (undated) DEVICE — GLOVE, PROTEXIS PI CLASSIC, SZ-8.0, PF, PF, LF

## (undated) DEVICE — HOLSTER, JET SAFETY

## (undated) DEVICE — MANIFOLD, 4 PORT NEPTUNE STANDARD

## (undated) DEVICE — CATHETER, URETHRAL, FOLEY, 2 WAY, CARSON, COUDE TIP, 16 FR, 5 CC

## (undated) DEVICE — WARMER, DUAL SCOPE

## (undated) DEVICE — DRAPE, TIBURON, LITHOTOMY, W/FLUID CONTROL POUCH

## (undated) DEVICE — SYSTEM, SMOKE EVAC, SEECLEAR XCL, 8.0 LITER, LF

## (undated) DEVICE — TOWEL PACK, STERILE, 16X24, XRAY DETECTABLE, BLUE, 4/PK

## (undated) DEVICE — SYRINGE, 10 CC, LUER LOCK

## (undated) DEVICE — STRAP, ARM BOARD, 32 X 1.5

## (undated) DEVICE — GLOVE, SURGICAL, PROTEXIS PI BLUE W/NEUTHERA, 8.0, PF, LF

## (undated) DEVICE — PROTECTOR, NERVE, ULNAR, PINK

## (undated) DEVICE — GOWN, SURGICAL, ROYAL SILK, LG, STERILE

## (undated) DEVICE — PAD, GROUNDING, ELECTROSURGICAL, W/9 FT CABLE, POLYHESIVE II, ADULT, LF

## (undated) DEVICE — SUTURE, ETHIBOND XTRA, 1, 30 IN, CTX, LF

## (undated) DEVICE — BAG, DRAINAGE, URINARY, LEG, MEDIUM, 19 OZ, DISPOSABLE

## (undated) DEVICE — ADHESIVE, SKIN, DERMABOND ADVANCED, 15CM, PEN-STYLE

## (undated) DEVICE — TRAY, SURESTEP, SILICONE DRAINAGE BAG, STATLOCK, 16FR

## (undated) DEVICE — SUTURE, MONOCRYL, 4-0, 27 IN, PS-2, UNDYED

## (undated) DEVICE — DRAPE, INCISE, ANTIMICROBIAL, IOBAN 2, LARGE, 17 X 23 IN, DISPOSABLE, STERILE

## (undated) DEVICE — SOLUTION, IRRIGATION, SODIUM CHLORIDE 0.9%, 1000 ML, POUR BOTTLE

## (undated) DEVICE — TUBING, INSUFFLATION, LAPAROSCOPIC, FILTER, 10 FT

## (undated) DEVICE — TRAY, SKIN SCRUB, WET PREP, WITH 4 COMPARMENT

## (undated) DEVICE — CUP, MEDICINE, GRADUATED, 2 OZ, PLASTIC, DISP, LF

## (undated) DEVICE — SUTURE, V-LOC, 2-0, 12IN, GS-21, GR 180 ABS

## (undated) DEVICE — CORD, MONOPOLARD, 10FT, DISP

## (undated) DEVICE — APPLICATOR, CHLORAPREP, W/ORANGE TINT, 26ML

## (undated) DEVICE — GLOVE, SURGICAL, PROTEXIS PI , 7.0, PF, LF

## (undated) DEVICE — SOLUTION KIT, ANTIFOG, 6CC, LF

## (undated) DEVICE — DRAPE, SHEET, ENDOSCOPY, GENERAL, FENESTRATED, ARMBOARD COVER, 98 X 123.5 IN, DISPOSABLE, LF, STERILE

## (undated) DEVICE — CAUTERY, PENCIL, PUSH BUTTON, SMOKE EVAC, 70MM

## (undated) DEVICE — GLOVE, SURGICAL, PROTEXIS PI BLUE W/NEUTHERA, 7.0, PF, LF

## (undated) DEVICE — APPLICATOR, COTTON TIP, 6 IN, 2PK, STERILE